# Patient Record
Sex: FEMALE | Race: WHITE | NOT HISPANIC OR LATINO | Employment: FULL TIME | URBAN - METROPOLITAN AREA
[De-identification: names, ages, dates, MRNs, and addresses within clinical notes are randomized per-mention and may not be internally consistent; named-entity substitution may affect disease eponyms.]

---

## 2017-04-04 ENCOUNTER — TRANSCRIBE ORDERS (OUTPATIENT)
Dept: ADMINISTRATIVE | Facility: HOSPITAL | Age: 62
End: 2017-04-04

## 2017-04-04 DIAGNOSIS — M85.80 OSTEOPENIA: Primary | ICD-10-CM

## 2017-04-11 ENCOUNTER — HOSPITAL ENCOUNTER (OUTPATIENT)
Dept: BONE DENSITY | Facility: HOSPITAL | Age: 62
Discharge: HOME | End: 2017-04-11
Admitting: FAMILY MEDICINE

## 2017-04-11 DIAGNOSIS — M85.80 OSTEOPENIA: ICD-10-CM

## 2017-04-11 PROCEDURE — 77080 DXA BONE DENSITY AXIAL: CPT

## 2017-04-11 PROCEDURE — 77080 DXA BONE DENSITY AXIAL: CPT | Performed by: RADIOLOGY

## 2017-10-09 ENCOUNTER — OFFICE VISIT (OUTPATIENT)
Dept: ORTHOPEDIC SURGERY | Facility: CLINIC | Age: 62
End: 2017-10-09

## 2017-10-09 VITALS
HEART RATE: 80 BPM | WEIGHT: 161 LBS | HEIGHT: 69 IN | DIASTOLIC BLOOD PRESSURE: 86 MMHG | BODY MASS INDEX: 23.85 KG/M2 | SYSTOLIC BLOOD PRESSURE: 150 MMHG

## 2017-10-09 DIAGNOSIS — M16.11 PRIMARY OSTEOARTHRITIS OF RIGHT HIP: Primary | ICD-10-CM

## 2017-10-09 PROCEDURE — 99203 OFFICE O/P NEW LOW 30 MIN: CPT | Performed by: ORTHOPAEDIC SURGERY

## 2017-10-09 RX ORDER — MELOXICAM 7.5 MG/1
15 TABLET ORAL ONCE
Status: CANCELLED | OUTPATIENT
Start: 2017-10-09 | End: 2017-10-09

## 2017-10-09 RX ORDER — CHLORHEXIDINE GLUCONATE 0.12 MG/ML
15 RINSE ORAL EVERY 12 HOURS SCHEDULED
Status: CANCELLED | OUTPATIENT
Start: 2017-10-09

## 2017-10-09 RX ORDER — OXYCODONE HCL 10 MG/1
10 TABLET, FILM COATED, EXTENDED RELEASE ORAL ONCE
Status: CANCELLED | OUTPATIENT
Start: 2017-10-09 | End: 2017-10-09

## 2017-10-09 RX ORDER — LEVOTHYROXINE SODIUM 112 UG/1
TABLET ORAL
Refills: 11 | COMMUNITY
Start: 2017-09-24 | End: 2017-11-21

## 2017-10-09 RX ORDER — ACETAMINOPHEN 325 MG/1
1000 TABLET ORAL ONCE
Status: CANCELLED | OUTPATIENT
Start: 2017-10-09 | End: 2017-10-09

## 2017-10-09 RX ORDER — LOTEPREDNOL ETABONATE AND TOBRAMYCIN 5; 3 MG/ML; MG/ML
SUSPENSION/ DROPS OPHTHALMIC
Refills: 0 | COMMUNITY
Start: 2017-10-03 | End: 2017-11-21

## 2017-10-09 RX ORDER — ALENDRONATE SODIUM 70 MG/1
TABLET ORAL
Refills: 11 | COMMUNITY
Start: 2017-07-14 | End: 2017-11-21

## 2017-10-09 RX ORDER — PREGABALIN 150 MG/1
150 CAPSULE ORAL ONCE
Status: CANCELLED | OUTPATIENT
Start: 2017-10-09 | End: 2017-10-09

## 2017-10-09 RX ORDER — PHENOL 1.4 %
600 AEROSOL, SPRAY (ML) MUCOUS MEMBRANE DAILY
COMMUNITY

## 2017-10-09 NOTE — PROGRESS NOTES
[facility] [place] [facility] Note    Subjective     Chief Complaint   Patient presents with   • Right Hip - Pain        HPI    Maria Eugenia Sandoval is a 62 y.o. female. She presents today for right hip pain.  Pain is been worsening over the past 6 months, but was present prior to that at a lower grade.  Pain is continuous, moderate to severe, associated with stiffness.  Pain is sharp, worsens with standing, sitting, and climbing stairs.  She did have a previous injection in her hip with Dr. Blanco in June 2017, which helped for about a month and a half.  He is interested in pursuing right total hip arthroplasty surgery.    The patient has been considering right hip total joint replacement surgery.  The pain has been severe, causing instability, and has been worsening in spite of medications, physical therapy, and joint injection.The pain interferes with walking, sleeping, work and leisure activities.  No previous history of blood clots and family history of clotting disorders.      There is no problem list on file for this patient.    Past Medical History:   Diagnosis Date   • Heart disease       Past Surgical History:   Procedure Laterality Date   • ANKLE OPEN REDUCTION INTERNAL FIXATION        Family History   Problem Relation Age of Onset   • Osteoarthritis Mother    • Breast cancer Neg Hx    • Ovarian cancer Neg Hx      Social History     Social History   • Marital status: Single     Spouse name: N/A   • Number of children: N/A   • Years of education: N/A     Occupational History   • Not on file.     Social History Main Topics   • Smoking status: Current Every Day Smoker   • Smokeless tobacco: Never Used   • Alcohol use Yes      Comment: occasional   • Drug use: No   • Sexual activity: Defer     Other Topics Concern   • Not on file     Social History Narrative      No current outpatient prescriptions on file prior to visit.     No current facility-administered medications on file prior to visit.       No Known  Allergies     Review of Systems   Constitutional: Negative for activity change, appetite change, chills, diaphoresis, fatigue, fever and unexpected weight change.   HENT: Negative for congestion, dental problem, drooling, ear discharge, ear pain, facial swelling, hearing loss, mouth sores, nosebleeds, postnasal drip, rhinorrhea, sinus pressure, sneezing, sore throat, tinnitus, trouble swallowing and voice change.    Eyes: Negative for photophobia, pain, discharge, redness, itching and visual disturbance.   Respiratory: Positive for cough. Negative for apnea, choking, chest tightness, shortness of breath, wheezing and stridor.    Cardiovascular: Negative for chest pain, palpitations and leg swelling.   Gastrointestinal: Negative for abdominal distention, abdominal pain, anal bleeding, blood in stool, constipation, diarrhea, nausea, rectal pain and vomiting.   Endocrine: Negative for cold intolerance, heat intolerance, polydipsia, polyphagia and polyuria.   Genitourinary: Negative for decreased urine volume, difficulty urinating, dysuria, enuresis, flank pain, frequency, genital sores, hematuria and urgency.   Musculoskeletal: Positive for arthralgias (Right Hip pain). Negative for back pain, gait problem, joint swelling, myalgias, neck pain and neck stiffness.   Skin: Negative for color change, pallor, rash and wound.   Allergic/Immunologic: Negative for environmental allergies, food allergies and immunocompromised state.   Neurological: Negative for dizziness, tremors, seizures, syncope, facial asymmetry, speech difficulty, weakness, light-headedness, numbness and headaches.   Hematological: Negative for adenopathy. Does not bruise/bleed easily.   Psychiatric/Behavioral: Negative for agitation, behavioral problems, confusion, decreased concentration, dysphoric mood, hallucinations, self-injury, sleep disturbance and suicidal ideas. The patient is not nervous/anxious and is not hyperactive.         Objective   "    Physical Exam  /86  Pulse 80  Ht 68.5\" (174 cm)  Wt 161 lb (73 kg)  BMI 24.12 kg/m2    Body mass index is 24.12 kg/(m^2).    General:   Mental Status:  Alert   Appearance: Cooperative, in no acute distress   Build and Nutrition: Well-nourished and well developed female   Orientation: Alert and oriented to person, place and time   Posture: Normal   Gait: Normal    Integument:   Right hip: No skin lesions, no rash, no ecchymosis    Neurologic:   Sensation:    Right foot: Intact to light touch on the dorsal and plantar aspect   Motor:  Right lower extremity: 5/5 quadriceps, hamstrings, ankle dorsiflexors, and ankle plantar flexors    Vascular:   Right lower extremity: 2+ dorsalis pedis pulse, prompt capillary refill    Lower Extremities:   Right Hip:    Tenderness:  None    Swelling: None    Crepitus:  None    Atrophy:  None    Range of motion:  External Rotation: 30°       Internal Rotation: 30°, with pain in the groin       Flexion:  100°       Extension:  0°   Instability:  None  Deformities:  None  Functional testing: Positive Stinchfield    No leg length discrepancy      Imaging/Studies    Outside radiographs were reviewed, which showed advanced arthritic changes in the right hip, with osteophyte formation on both the femoral head and acetabulum.    Assessment and Plan     Maria Eugenia was seen today for pain.    Diagnoses and all orders for this visit:    Primary osteoarthritis of right hip  -     Case Request; Standing  -     CBC and Differential; Future  -     Comprehensive metabolic panel; Future  -     Protime-INR; Future  -     APTT; Future  -     Hemoglobin A1c; Future  -     Sedimentation rate; Future  -     C-reactive protein; Future  -     Type and screen; Future  -     Urinalysis With / Culture If Indicated - Urine, Clean Catch; Future  -     ECG 12 Lead; Future  -     ceFAZolin (ANCEF) 2 g in sodium chloride 0.9 % 100 mL IVPB; Infuse 2 g into a venous catheter 1 (One) Time.  -     acetaminophen " (TYLENOL) tablet 975 mg; Take 3 tablets by mouth 1 (One) Time.  -     meloxicam (MOBIC) tablet 15 mg; Take 2 tablets by mouth 1 (One) Time.  -     pregabalin (LYRICA) capsule 150 mg; Take 1 capsule by mouth 1 (One) Time.  -     mupirocin (BACTROBAN) 2 % nasal ointment 1 application; 1 application by Each Nare route 1 (One) Time.  -     oxyCODONE (oxyCONTIN) 12 hr tablet 10 mg; Take 1 tablet by mouth 1 (One) Time.  -     Tranexamic Acid 730 mg in sodium chloride 0.9 % 250 mL; Infuse 730 mg into a venous catheter 1 (One) Time.  -     Tranexamic Acid 730 mg in sodium chloride 0.9 % 250 mL; Infuse 730 mg into a venous catheter 1 (One) Time.  -     mupirocin (BACTROBAN) 2 % nasal ointment; into each nostril 2 (Two) Times a Day.  -     chlorhexidine (PERIDEX) 0.12 % solution 15 mL; Apply 15 mL to the mouth or throat Every 12 (Twelve) Hours.  -     Case Request    Other orders  -     Inpatient Admission; Standing  -     Follow Anesthesia Guidelines / Standing Orders; Future  -     Orthopedic Discharge Planning for PT & Case Management - Inpatient With Post-Op Day 2 or 3 Discharge  -     Obtain informed consent  -     Provide instructions to patient regarding NPO status  -     Clorhexidine skin prep  -     Follow Anesthesia Guidelines / Standing Orders; Standing  -     Verify NPO Status; Standing  -     CHRISTINE hose- To be placed on patient in pre-op; Standing  -     SCD (sequential compression device)- to be placed on patient in Pre-op; Standing  -     Clip operative site; Standing  -     Obtain informed consent (if not collected inpatient or PAT); Standing  -     Notify Physician - Standard; Standing        I reviewed my findings with patient today.  She has advanced right hip arthritis, and has been considering right total hip arthroplasty surgery, and presented today in hopes of scheduling.  She is a good candidate.  Please see my notes and above for details.  We will schedule at a mutually convenient time.  We see my  counseling note for details.    Return for For surgery as planned.     Surgical Counseling     I have informed the patient of the diagnosis and the prognosis.  Exhaustive conservative treatment modalities have not resulted in long term pain relief.  The symptoms have progressed to the point of daily pain and inability to perform activities of daily living without significant pain.  The patient has reached the point of desiring to proceed with total hip arthroplasty after discussing the risks, benefits and alternatives to the procedure.  The surgical procedure itself was discussed in detail.  Risks of the procedure were discussed, which included but are not limited to, bleeding, infection, damage to blood vessels and nerves, loosening of the prosthesis, deep infection, need for further surgery, leg length discrepancy, hip dislocation, loss of limb, deep venous thrombosis, pulmonary embolus, death, heart attack, stroke, kidney failure, liver failure, and anesthetic complications.  In addition, the potential for deep infection developing in the future was discussed, which could require further surgery.  The hip would have to be re-opened, debrided, and potentially remove the prosthesis, which may or may not be replaced in the future.  Also, the possibility for loosening of the prosthesis has been mentioned.  If the prosthesis loosened, a revision arthroplasty could be performed, with results that are not as predictable compared to the original procedure.  The typical rehabilitative course has also been discussed, and full recovery may take up to a year to see the maximum benefit.  The importance of patient cooperation in the rehabilitative efforts has also been discussed.  No guarantees whatsoever were given.  The patient understands the potential risks versus the benefits and desires to proceed with total hip arthroplasty at a mutually convenient time.      Medical Decision Making  Management Options : major surgery  with risk factors  Data/Risk: independent visualization of imaging, lab tests, or EMG/NCV      Marek Talamantes MD  10/09/17  9:10 AM

## 2017-11-21 ENCOUNTER — APPOINTMENT (OUTPATIENT)
Dept: PREADMISSION TESTING | Facility: HOSPITAL | Age: 62
End: 2017-11-21

## 2017-11-21 VITALS — HEIGHT: 70 IN | WEIGHT: 167.11 LBS | BODY MASS INDEX: 23.92 KG/M2

## 2017-11-21 DIAGNOSIS — Z01.89 LABORATORY TEST: Primary | ICD-10-CM

## 2017-11-21 DIAGNOSIS — M16.11 PRIMARY OSTEOARTHRITIS OF RIGHT HIP: ICD-10-CM

## 2017-11-21 LAB
ABO GROUP BLD: NORMAL
ALBUMIN SERPL-MCNC: 4.6 G/DL (ref 3.2–4.8)
ALBUMIN/GLOB SERPL: 1.4 G/DL (ref 1.5–2.5)
ALP SERPL-CCNC: 92 U/L (ref 25–100)
ALT SERPL W P-5'-P-CCNC: 20 U/L (ref 7–40)
ANION GAP SERPL CALCULATED.3IONS-SCNC: 4 MMOL/L (ref 3–11)
APTT PPP: 27.1 SECONDS (ref 24–31)
AST SERPL-CCNC: 27 U/L (ref 0–33)
BASOPHILS # BLD AUTO: 0.06 10*3/MM3 (ref 0–0.2)
BASOPHILS NFR BLD AUTO: 1 % (ref 0–1)
BILIRUB SERPL-MCNC: 0.7 MG/DL (ref 0.3–1.2)
BILIRUB UR QL STRIP: NEGATIVE
BUN BLD-MCNC: 9 MG/DL (ref 9–23)
BUN/CREAT SERPL: 15 (ref 7–25)
CALCIUM SPEC-SCNC: 9.9 MG/DL (ref 8.7–10.4)
CHLORIDE SERPL-SCNC: 107 MMOL/L (ref 99–109)
CLARITY UR: CLEAR
CO2 SERPL-SCNC: 27 MMOL/L (ref 20–31)
COLOR UR: YELLOW
CREAT BLD-MCNC: 0.6 MG/DL (ref 0.6–1.3)
CRP SERPL-MCNC: 0.2 MG/DL (ref 0–1)
DEPRECATED RDW RBC AUTO: 46.5 FL (ref 37–54)
EOSINOPHIL # BLD AUTO: 0.15 10*3/MM3 (ref 0–0.3)
EOSINOPHIL NFR BLD AUTO: 2.4 % (ref 0–3)
ERYTHROCYTE [DISTWIDTH] IN BLOOD BY AUTOMATED COUNT: 12.6 % (ref 11.3–14.5)
ERYTHROCYTE [SEDIMENTATION RATE] IN BLOOD: 12 MM/HR (ref 0–30)
GFR SERPL CREATININE-BSD FRML MDRD: 102 ML/MIN/1.73
GLOBULIN UR ELPH-MCNC: 3.4 GM/DL
GLUCOSE BLD-MCNC: 92 MG/DL (ref 70–100)
GLUCOSE UR STRIP-MCNC: NEGATIVE MG/DL
HBA1C MFR BLD: 5.1 % (ref 4.8–5.6)
HCT VFR BLD AUTO: 42.9 % (ref 34.5–44)
HGB BLD-MCNC: 14.4 G/DL (ref 11.5–15.5)
HGB UR QL STRIP.AUTO: NEGATIVE
IMM GRANULOCYTES # BLD: 0.01 10*3/MM3 (ref 0–0.03)
IMM GRANULOCYTES NFR BLD: 0.2 % (ref 0–0.6)
INR PPP: 1
KETONES UR QL STRIP: NEGATIVE
LEUKOCYTE ESTERASE UR QL STRIP.AUTO: NEGATIVE
LYMPHOCYTES # BLD AUTO: 2.29 10*3/MM3 (ref 0.6–4.8)
LYMPHOCYTES NFR BLD AUTO: 36.8 % (ref 24–44)
MCH RBC QN AUTO: 34 PG (ref 27–31)
MCHC RBC AUTO-ENTMCNC: 33.6 G/DL (ref 32–36)
MCV RBC AUTO: 101.4 FL (ref 80–99)
MONOCYTES # BLD AUTO: 0.47 10*3/MM3 (ref 0–1)
MONOCYTES NFR BLD AUTO: 7.6 % (ref 0–12)
NEUTROPHILS # BLD AUTO: 3.24 10*3/MM3 (ref 1.5–8.3)
NEUTROPHILS NFR BLD AUTO: 52 % (ref 41–71)
NITRITE UR QL STRIP: NEGATIVE
PH UR STRIP.AUTO: 7 [PH] (ref 5–8)
PLATELET # BLD AUTO: 216 10*3/MM3 (ref 150–450)
PMV BLD AUTO: 10.6 FL (ref 6–12)
POTASSIUM BLD-SCNC: 4.3 MMOL/L (ref 3.5–5.5)
PROT SERPL-MCNC: 8 G/DL (ref 5.7–8.2)
PROT UR QL STRIP: NEGATIVE
PROTHROMBIN TIME: 10.9 SECONDS (ref 9.6–11.5)
RBC # BLD AUTO: 4.23 10*6/MM3 (ref 3.89–5.14)
RH BLD: POSITIVE
SODIUM BLD-SCNC: 138 MMOL/L (ref 132–146)
SP GR UR STRIP: <=1.005 (ref 1–1.03)
UROBILINOGEN UR QL STRIP: NORMAL
WBC NRBC COR # BLD: 6.22 10*3/MM3 (ref 3.5–10.8)

## 2017-11-21 PROCEDURE — 85610 PROTHROMBIN TIME: CPT | Performed by: ORTHOPAEDIC SURGERY

## 2017-11-21 PROCEDURE — 85025 COMPLETE CBC W/AUTO DIFF WBC: CPT | Performed by: ORTHOPAEDIC SURGERY

## 2017-11-21 PROCEDURE — 36415 COLL VENOUS BLD VENIPUNCTURE: CPT

## 2017-11-21 PROCEDURE — 80053 COMPREHEN METABOLIC PANEL: CPT | Performed by: ORTHOPAEDIC SURGERY

## 2017-11-21 PROCEDURE — 85730 THROMBOPLASTIN TIME PARTIAL: CPT | Performed by: ORTHOPAEDIC SURGERY

## 2017-11-21 PROCEDURE — 83036 HEMOGLOBIN GLYCOSYLATED A1C: CPT | Performed by: ORTHOPAEDIC SURGERY

## 2017-11-21 PROCEDURE — 86140 C-REACTIVE PROTEIN: CPT | Performed by: ORTHOPAEDIC SURGERY

## 2017-11-21 PROCEDURE — 81003 URINALYSIS AUTO W/O SCOPE: CPT | Performed by: ORTHOPAEDIC SURGERY

## 2017-11-21 PROCEDURE — 86901 BLOOD TYPING SEROLOGIC RH(D): CPT

## 2017-11-21 PROCEDURE — 93010 ELECTROCARDIOGRAM REPORT: CPT | Performed by: INTERNAL MEDICINE

## 2017-11-21 PROCEDURE — 93005 ELECTROCARDIOGRAM TRACING: CPT

## 2017-11-21 PROCEDURE — 86900 BLOOD TYPING SEROLOGIC ABO: CPT

## 2017-11-21 RX ORDER — IBUPROFEN 200 MG
600 TABLET ORAL EVERY 6 HOURS PRN
Status: ON HOLD | COMMUNITY
End: 2017-12-06

## 2017-11-21 RX ORDER — LEVOTHYROXINE SODIUM 112 UG/1
112 TABLET ORAL DAILY
COMMUNITY
End: 2022-10-21

## 2017-11-21 RX ORDER — CIPROFLOXACIN 500 MG/1
500 TABLET, FILM COATED ORAL 2 TIMES DAILY
Status: ON HOLD | COMMUNITY
End: 2017-12-05

## 2017-11-21 RX ORDER — METRONIDAZOLE 500 MG/1
500 TABLET ORAL 3 TIMES DAILY
Status: ON HOLD | COMMUNITY
End: 2017-12-05

## 2017-11-21 ASSESSMENT — HOOS JR
HOOS JR SCORE: 49.858
HOOS JR SCORE: 13

## 2017-12-04 ENCOUNTER — ANESTHESIA EVENT (OUTPATIENT)
Dept: PERIOP | Facility: HOSPITAL | Age: 62
End: 2017-12-04

## 2017-12-04 DIAGNOSIS — M16.11 PRIMARY OSTEOARTHRITIS OF RIGHT HIP: Primary | ICD-10-CM

## 2017-12-04 RX ORDER — FAMOTIDINE 10 MG/ML
20 INJECTION, SOLUTION INTRAVENOUS ONCE
Status: CANCELLED | OUTPATIENT
Start: 2017-12-04 | End: 2017-12-04

## 2017-12-05 ENCOUNTER — ANESTHESIA (OUTPATIENT)
Dept: PERIOP | Facility: HOSPITAL | Age: 62
End: 2017-12-05

## 2017-12-05 ENCOUNTER — HOSPITAL ENCOUNTER (INPATIENT)
Facility: HOSPITAL | Age: 62
LOS: 1 days | Discharge: HOME | End: 2017-12-06
Attending: ORTHOPAEDIC SURGERY | Admitting: ORTHOPAEDIC SURGERY

## 2017-12-05 ENCOUNTER — APPOINTMENT (OUTPATIENT)
Dept: GENERAL RADIOLOGY | Facility: HOSPITAL | Age: 62
End: 2017-12-05

## 2017-12-05 DIAGNOSIS — Z78.9 IMPAIRED MOBILITY AND ADLS: ICD-10-CM

## 2017-12-05 DIAGNOSIS — M16.11 PRIMARY OSTEOARTHRITIS OF RIGHT HIP: ICD-10-CM

## 2017-12-05 DIAGNOSIS — Z74.09 IMPAIRED MOBILITY AND ADLS: ICD-10-CM

## 2017-12-05 DIAGNOSIS — Z96.641 STATUS POST TOTAL REPLACEMENT OF RIGHT HIP: ICD-10-CM

## 2017-12-05 DIAGNOSIS — Z74.09 IMPAIRED FUNCTIONAL MOBILITY, BALANCE, GAIT, AND ENDURANCE: Primary | ICD-10-CM

## 2017-12-05 PROBLEM — Z72.0 TOBACCO ABUSE: Status: ACTIVE | Noted: 2017-12-05

## 2017-12-05 PROBLEM — E03.9 HYPOTHYROID: Status: ACTIVE | Noted: 2017-12-05

## 2017-12-05 LAB
ABO GROUP BLD: NORMAL
BLD GP AB SCN SERPL QL: NEGATIVE
RH BLD: POSITIVE

## 2017-12-05 PROCEDURE — C1755 CATHETER, INTRASPINAL: HCPCS | Performed by: ORTHOPAEDIC SURGERY

## 2017-12-05 PROCEDURE — 25010000002 ROPIVACAINE PER 1 MG: Performed by: ORTHOPAEDIC SURGERY

## 2017-12-05 PROCEDURE — 97162 PT EVAL MOD COMPLEX 30 MIN: CPT

## 2017-12-05 PROCEDURE — 25010000002 ONDANSETRON PER 1 MG: Performed by: NURSE ANESTHETIST, CERTIFIED REGISTERED

## 2017-12-05 PROCEDURE — 25010000002 HYDROMORPHONE PER 4 MG: Performed by: ORTHOPAEDIC SURGERY

## 2017-12-05 PROCEDURE — 25010000002 PROPOFOL 10 MG/ML EMULSION: Performed by: ANESTHESIOLOGY

## 2017-12-05 PROCEDURE — 25010000002 DEXAMETHASONE PER 1 MG: Performed by: NURSE ANESTHETIST, CERTIFIED REGISTERED

## 2017-12-05 PROCEDURE — 0SR903A REPLACEMENT OF RIGHT HIP JOINT WITH CERAMIC SYNTHETIC SUBSTITUTE, UNCEMENTED, OPEN APPROACH: ICD-10-PCS | Performed by: ORTHOPAEDIC SURGERY

## 2017-12-05 PROCEDURE — 86900 BLOOD TYPING SEROLOGIC ABO: CPT | Performed by: ORTHOPAEDIC SURGERY

## 2017-12-05 PROCEDURE — 27130 TOTAL HIP ARTHROPLASTY: CPT | Performed by: ORTHOPAEDIC SURGERY

## 2017-12-05 PROCEDURE — 73502 X-RAY EXAM HIP UNI 2-3 VIEWS: CPT

## 2017-12-05 PROCEDURE — C1776 JOINT DEVICE (IMPLANTABLE): HCPCS | Performed by: ORTHOPAEDIC SURGERY

## 2017-12-05 PROCEDURE — 25010000003 MORPHINE PER 10 MG: Performed by: ORTHOPAEDIC SURGERY

## 2017-12-05 PROCEDURE — 25010000003 CEFAZOLIN IN DEXTROSE 2-4 GM/100ML-% SOLUTION: Performed by: ORTHOPAEDIC SURGERY

## 2017-12-05 PROCEDURE — 86901 BLOOD TYPING SEROLOGIC RH(D): CPT | Performed by: ORTHOPAEDIC SURGERY

## 2017-12-05 PROCEDURE — 86850 RBC ANTIBODY SCREEN: CPT | Performed by: ORTHOPAEDIC SURGERY

## 2017-12-05 PROCEDURE — 97116 GAIT TRAINING THERAPY: CPT

## 2017-12-05 DEVICE — TOTL HIP COA DEPUY 9641334: Type: IMPLANTABLE DEVICE | Site: ACETABULUM | Status: FUNCTIONAL

## 2017-12-05 DEVICE — TOTL HIP GRIPTION CUP DEPUY UPCHRG: Type: IMPLANTABLE DEVICE | Site: ACETABULUM | Status: FUNCTIONAL

## 2017-12-05 DEVICE — PINNACLE HIP SOLUTIONS ALTRX POLYETHYLENE ACETABULAR LINER +4 NEUTRAL 36MM ID 52MM OD
Type: IMPLANTABLE DEVICE | Site: ACETABULUM | Status: FUNCTIONAL
Brand: PINNACLE ALTRX

## 2017-12-05 DEVICE — PINNACLE GRIPTION ACETABULAR SHELL SECTOR 52MM OD
Type: IMPLANTABLE DEVICE | Site: ACETABULUM | Status: FUNCTIONAL
Brand: PINNACLE GRIPTION

## 2017-12-05 DEVICE — SUMMIT FEMORAL STEM 12/14 TAPER TAPER ED W/POROCOAT SIZE 5 STD 145MM
Type: IMPLANTABLE DEVICE | Site: ACETABULUM | Status: FUNCTIONAL
Brand: SUMMIT POROCOAT

## 2017-12-05 DEVICE — BIOLOX DELTA CERAMIC FEMORAL HEAD +1.5 36MM DIA 12/14 TAPER
Type: IMPLANTABLE DEVICE | Site: ACETABULUM | Status: FUNCTIONAL
Brand: BIOLOX DELTA

## 2017-12-05 RX ORDER — MORPHINE SULFATE 0.5 MG/ML
INJECTION, SOLUTION EPIDURAL; INTRATHECAL; INTRAVENOUS AS NEEDED
Status: DISCONTINUED | OUTPATIENT
Start: 2017-12-05 | End: 2017-12-05 | Stop reason: HOSPADM

## 2017-12-05 RX ORDER — MELOXICAM 7.5 MG/1
15 TABLET ORAL ONCE
Status: COMPLETED | OUTPATIENT
Start: 2017-12-05 | End: 2017-12-05

## 2017-12-05 RX ORDER — ACETAMINOPHEN 500 MG
1000 TABLET ORAL ONCE
Status: COMPLETED | OUTPATIENT
Start: 2017-12-05 | End: 2017-12-05

## 2017-12-05 RX ORDER — ONDANSETRON 2 MG/ML
4 INJECTION INTRAMUSCULAR; INTRAVENOUS EVERY 6 HOURS PRN
Status: DISCONTINUED | OUTPATIENT
Start: 2017-12-05 | End: 2017-12-06 | Stop reason: HOSPADM

## 2017-12-05 RX ORDER — CHLORHEXIDINE GLUCONATE 0.12 MG/ML
15 RINSE ORAL EVERY 12 HOURS
Status: DISCONTINUED | OUTPATIENT
Start: 2017-12-05 | End: 2017-12-06 | Stop reason: HOSPADM

## 2017-12-05 RX ORDER — ROPIVACAINE HYDROCHLORIDE 5 MG/ML
INJECTION, SOLUTION EPIDURAL; INFILTRATION; PERINEURAL AS NEEDED
Status: DISCONTINUED | OUTPATIENT
Start: 2017-12-05 | End: 2017-12-05 | Stop reason: HOSPADM

## 2017-12-05 RX ORDER — PROMETHAZINE HYDROCHLORIDE 25 MG/ML
6.25 INJECTION, SOLUTION INTRAMUSCULAR; INTRAVENOUS ONCE AS NEEDED
Status: DISCONTINUED | OUTPATIENT
Start: 2017-12-05 | End: 2017-12-05 | Stop reason: HOSPADM

## 2017-12-05 RX ORDER — NALOXONE HCL 0.4 MG/ML
0.1 VIAL (ML) INJECTION
Status: DISCONTINUED | OUTPATIENT
Start: 2017-12-05 | End: 2017-12-06 | Stop reason: HOSPADM

## 2017-12-05 RX ORDER — NICOTINE 21 MG/24HR
1 PATCH, TRANSDERMAL 24 HOURS TRANSDERMAL
Status: DISCONTINUED | OUTPATIENT
Start: 2017-12-05 | End: 2017-12-06 | Stop reason: HOSPADM

## 2017-12-05 RX ORDER — PREGABALIN 75 MG/1
150 CAPSULE ORAL ONCE
Status: COMPLETED | OUTPATIENT
Start: 2017-12-05 | End: 2017-12-05

## 2017-12-05 RX ORDER — HYDROMORPHONE HYDROCHLORIDE 1 MG/ML
0.5 INJECTION, SOLUTION INTRAMUSCULAR; INTRAVENOUS; SUBCUTANEOUS
Status: DISCONTINUED | OUTPATIENT
Start: 2017-12-05 | End: 2017-12-06 | Stop reason: HOSPADM

## 2017-12-05 RX ORDER — LEVOTHYROXINE SODIUM 112 UG/1
112 TABLET ORAL
Status: DISCONTINUED | OUTPATIENT
Start: 2017-12-06 | End: 2017-12-06 | Stop reason: HOSPADM

## 2017-12-05 RX ORDER — CEFAZOLIN SODIUM 2 G/100ML
2 INJECTION, SOLUTION INTRAVENOUS EVERY 8 HOURS
Status: COMPLETED | OUTPATIENT
Start: 2017-12-05 | End: 2017-12-06

## 2017-12-05 RX ORDER — PROPOFOL 10 MG/ML
VIAL (ML) INTRAVENOUS CONTINUOUS PRN
Status: DISCONTINUED | OUTPATIENT
Start: 2017-12-05 | End: 2017-12-05 | Stop reason: SURG

## 2017-12-05 RX ORDER — BISACODYL 10 MG
10 SUPPOSITORY, RECTAL RECTAL DAILY PRN
Status: DISCONTINUED | OUTPATIENT
Start: 2017-12-05 | End: 2017-12-06 | Stop reason: HOSPADM

## 2017-12-05 RX ORDER — OXYCODONE HCL 10 MG/1
10 TABLET, FILM COATED, EXTENDED RELEASE ORAL ONCE
Status: COMPLETED | OUTPATIENT
Start: 2017-12-05 | End: 2017-12-05

## 2017-12-05 RX ORDER — DEXAMETHASONE SODIUM PHOSPHATE 10 MG/ML
INJECTION INTRAMUSCULAR; INTRAVENOUS AS NEEDED
Status: DISCONTINUED | OUTPATIENT
Start: 2017-12-05 | End: 2017-12-05 | Stop reason: SURG

## 2017-12-05 RX ORDER — FENTANYL CITRATE 50 UG/ML
50 INJECTION, SOLUTION INTRAMUSCULAR; INTRAVENOUS
Status: DISCONTINUED | OUTPATIENT
Start: 2017-12-05 | End: 2017-12-05 | Stop reason: HOSPADM

## 2017-12-05 RX ORDER — SODIUM CHLORIDE 0.9 % (FLUSH) 0.9 %
1-10 SYRINGE (ML) INJECTION AS NEEDED
Status: DISCONTINUED | OUTPATIENT
Start: 2017-12-05 | End: 2017-12-05 | Stop reason: HOSPADM

## 2017-12-05 RX ORDER — ONDANSETRON 2 MG/ML
INJECTION INTRAMUSCULAR; INTRAVENOUS AS NEEDED
Status: DISCONTINUED | OUTPATIENT
Start: 2017-12-05 | End: 2017-12-05 | Stop reason: SURG

## 2017-12-05 RX ORDER — DIPHENHYDRAMINE HCL 25 MG
25 CAPSULE ORAL EVERY 6 HOURS PRN
Status: DISCONTINUED | OUTPATIENT
Start: 2017-12-05 | End: 2017-12-06 | Stop reason: HOSPADM

## 2017-12-05 RX ORDER — MORPHINE SULFATE 2 MG/ML
4 INJECTION, SOLUTION INTRAMUSCULAR; INTRAVENOUS
Status: DISCONTINUED | OUTPATIENT
Start: 2017-12-05 | End: 2017-12-06 | Stop reason: HOSPADM

## 2017-12-05 RX ORDER — MAGNESIUM HYDROXIDE 1200 MG/15ML
LIQUID ORAL AS NEEDED
Status: DISCONTINUED | OUTPATIENT
Start: 2017-12-05 | End: 2017-12-05 | Stop reason: HOSPADM

## 2017-12-05 RX ORDER — CHLORHEXIDINE GLUCONATE 0.12 MG/ML
15 RINSE ORAL EVERY 12 HOURS SCHEDULED
Status: DISCONTINUED | OUTPATIENT
Start: 2017-12-05 | End: 2017-12-05

## 2017-12-05 RX ORDER — HYDRALAZINE HYDROCHLORIDE 20 MG/ML
10 INJECTION INTRAMUSCULAR; INTRAVENOUS EVERY 6 HOURS PRN
Status: DISCONTINUED | OUTPATIENT
Start: 2017-12-05 | End: 2017-12-06 | Stop reason: HOSPADM

## 2017-12-05 RX ORDER — SODIUM CHLORIDE 0.9 % (FLUSH) 0.9 %
1-10 SYRINGE (ML) INJECTION AS NEEDED
Status: DISCONTINUED | OUTPATIENT
Start: 2017-12-05 | End: 2017-12-06 | Stop reason: HOSPADM

## 2017-12-05 RX ORDER — LIDOCAINE HYDROCHLORIDE 10 MG/ML
0.5 INJECTION, SOLUTION EPIDURAL; INFILTRATION; INTRACAUDAL; PERINEURAL ONCE AS NEEDED
Status: COMPLETED | OUTPATIENT
Start: 2017-12-05 | End: 2017-12-05

## 2017-12-05 RX ORDER — PROMETHAZINE HYDROCHLORIDE 25 MG/1
25 TABLET ORAL ONCE AS NEEDED
Status: DISCONTINUED | OUTPATIENT
Start: 2017-12-05 | End: 2017-12-05 | Stop reason: HOSPADM

## 2017-12-05 RX ORDER — BUPIVACAINE HYDROCHLORIDE 5 MG/ML
INJECTION, SOLUTION EPIDURAL; INTRACAUDAL AS NEEDED
Status: DISCONTINUED | OUTPATIENT
Start: 2017-12-05 | End: 2017-12-05 | Stop reason: SURG

## 2017-12-05 RX ORDER — CEFAZOLIN SODIUM 2 G/100ML
2 INJECTION, SOLUTION INTRAVENOUS ONCE
Status: COMPLETED | OUTPATIENT
Start: 2017-12-05 | End: 2017-12-05

## 2017-12-05 RX ORDER — SODIUM CHLORIDE 9 MG/ML
120 INJECTION, SOLUTION INTRAVENOUS CONTINUOUS
Status: DISCONTINUED | OUTPATIENT
Start: 2017-12-05 | End: 2017-12-06 | Stop reason: HOSPADM

## 2017-12-05 RX ORDER — FAMOTIDINE 20 MG/1
20 TABLET, FILM COATED ORAL ONCE
Status: COMPLETED | OUTPATIENT
Start: 2017-12-05 | End: 2017-12-05

## 2017-12-05 RX ORDER — ONDANSETRON 4 MG/1
4 TABLET, FILM COATED ORAL EVERY 6 HOURS PRN
Status: DISCONTINUED | OUTPATIENT
Start: 2017-12-05 | End: 2017-12-06 | Stop reason: HOSPADM

## 2017-12-05 RX ORDER — SODIUM CHLORIDE, SODIUM LACTATE, POTASSIUM CHLORIDE, CALCIUM CHLORIDE 600; 310; 30; 20 MG/100ML; MG/100ML; MG/100ML; MG/100ML
9 INJECTION, SOLUTION INTRAVENOUS CONTINUOUS
Status: DISCONTINUED | OUTPATIENT
Start: 2017-12-05 | End: 2017-12-05

## 2017-12-05 RX ORDER — DOCUSATE SODIUM 100 MG/1
100 CAPSULE, LIQUID FILLED ORAL 2 TIMES DAILY PRN
Status: DISCONTINUED | OUTPATIENT
Start: 2017-12-05 | End: 2017-12-06 | Stop reason: HOSPADM

## 2017-12-05 RX ORDER — MELOXICAM 7.5 MG/1
15 TABLET ORAL DAILY
Status: DISCONTINUED | OUTPATIENT
Start: 2017-12-05 | End: 2017-12-06 | Stop reason: HOSPADM

## 2017-12-05 RX ORDER — HYDROCODONE BITARTRATE AND ACETAMINOPHEN 7.5; 325 MG/1; MG/1
1 TABLET ORAL EVERY 4 HOURS PRN
Status: DISCONTINUED | OUTPATIENT
Start: 2017-12-05 | End: 2017-12-06 | Stop reason: HOSPADM

## 2017-12-05 RX ORDER — PROMETHAZINE HYDROCHLORIDE 25 MG/1
25 SUPPOSITORY RECTAL ONCE AS NEEDED
Status: DISCONTINUED | OUTPATIENT
Start: 2017-12-05 | End: 2017-12-05 | Stop reason: HOSPADM

## 2017-12-05 RX ORDER — EPHEDRINE SULFATE 50 MG/ML
5 INJECTION, SOLUTION INTRAVENOUS ONCE AS NEEDED
Status: DISCONTINUED | OUTPATIENT
Start: 2017-12-05 | End: 2017-12-05 | Stop reason: HOSPADM

## 2017-12-05 RX ORDER — NALOXONE HCL 0.4 MG/ML
0.4 VIAL (ML) INJECTION
Status: DISCONTINUED | OUTPATIENT
Start: 2017-12-05 | End: 2017-12-06 | Stop reason: HOSPADM

## 2017-12-05 RX ORDER — BISACODYL 5 MG/1
10 TABLET, DELAYED RELEASE ORAL DAILY PRN
Status: DISCONTINUED | OUTPATIENT
Start: 2017-12-05 | End: 2017-12-06 | Stop reason: HOSPADM

## 2017-12-05 RX ORDER — SENNA AND DOCUSATE SODIUM 50; 8.6 MG/1; MG/1
2 TABLET, FILM COATED ORAL 2 TIMES DAILY
Status: DISCONTINUED | OUTPATIENT
Start: 2017-12-05 | End: 2017-12-06 | Stop reason: HOSPADM

## 2017-12-05 RX ORDER — ACETAMINOPHEN 325 MG/1
650 TABLET ORAL EVERY 4 HOURS PRN
Status: DISCONTINUED | OUTPATIENT
Start: 2017-12-05 | End: 2017-12-06 | Stop reason: HOSPADM

## 2017-12-05 RX ORDER — ASPIRIN 325 MG
325 TABLET, DELAYED RELEASE (ENTERIC COATED) ORAL DAILY
Status: DISCONTINUED | OUTPATIENT
Start: 2017-12-06 | End: 2017-12-06 | Stop reason: HOSPADM

## 2017-12-05 RX ADMIN — FAMOTIDINE 20 MG: 20 TABLET, FILM COATED ORAL at 10:34

## 2017-12-05 RX ADMIN — ACETAMINOPHEN 1000 MG: 500 TABLET ORAL at 10:34

## 2017-12-05 RX ADMIN — MUPIROCIN 1 APPLICATION: 20 OINTMENT TOPICAL at 10:34

## 2017-12-05 RX ADMIN — MELOXICAM 15 MG: 7.5 TABLET ORAL at 10:34

## 2017-12-05 RX ADMIN — Medication 2 TABLET: at 17:31

## 2017-12-05 RX ADMIN — TRANEXAMIC ACID 1000 MG: 100 INJECTION, SOLUTION INTRAVENOUS at 12:15

## 2017-12-05 RX ADMIN — DEXAMETHASONE SODIUM PHOSPHATE 8 MG: 10 INJECTION INTRAMUSCULAR; INTRAVENOUS at 12:21

## 2017-12-05 RX ADMIN — HYDROCODONE BITARTRATE AND ACETAMINOPHEN 1 TABLET: 7.5; 325 TABLET ORAL at 21:35

## 2017-12-05 RX ADMIN — MELOXICAM 15 MG: 7.5 TABLET ORAL at 16:13

## 2017-12-05 RX ADMIN — HYDROMORPHONE HYDROCHLORIDE 0.5 MG: 10 INJECTION INTRAMUSCULAR; INTRAVENOUS; SUBCUTANEOUS at 21:37

## 2017-12-05 RX ADMIN — CEFAZOLIN SODIUM 2 G: 2 INJECTION, SOLUTION INTRAVENOUS at 21:35

## 2017-12-05 RX ADMIN — PROPOFOL 100 MCG/KG/MIN: 10 INJECTION, EMULSION INTRAVENOUS at 12:05

## 2017-12-05 RX ADMIN — HYDROCODONE BITARTRATE AND ACETAMINOPHEN 1 TABLET: 7.5; 325 TABLET ORAL at 16:39

## 2017-12-05 RX ADMIN — SODIUM CHLORIDE, POTASSIUM CHLORIDE, SODIUM LACTATE AND CALCIUM CHLORIDE 9 ML/HR: 600; 310; 30; 20 INJECTION, SOLUTION INTRAVENOUS at 10:35

## 2017-12-05 RX ADMIN — NICOTINE 1 PATCH: 21 PATCH, EXTENDED RELEASE TRANSDERMAL at 16:12

## 2017-12-05 RX ADMIN — SODIUM CHLORIDE, POTASSIUM CHLORIDE, SODIUM LACTATE AND CALCIUM CHLORIDE: 600; 310; 30; 20 INJECTION, SOLUTION INTRAVENOUS at 11:53

## 2017-12-05 RX ADMIN — SODIUM CHLORIDE 120 ML/HR: 9 INJECTION, SOLUTION INTRAVENOUS at 14:59

## 2017-12-05 RX ADMIN — BUPIVACAINE HYDROCHLORIDE 15 MG: 5 INJECTION, SOLUTION EPIDURAL; INTRACAUDAL; PERINEURAL at 12:05

## 2017-12-05 RX ADMIN — TRANEXAMIC ACID 1000 MG: 100 INJECTION, SOLUTION INTRAVENOUS at 13:33

## 2017-12-05 RX ADMIN — CEFAZOLIN SODIUM 2 G: 2 INJECTION, SOLUTION INTRAVENOUS at 12:05

## 2017-12-05 RX ADMIN — ONDANSETRON 4 MG: 2 INJECTION INTRAMUSCULAR; INTRAVENOUS at 13:54

## 2017-12-05 RX ADMIN — OXYCODONE HYDROCHLORIDE 10 MG: 10 TABLET, FILM COATED, EXTENDED RELEASE ORAL at 10:34

## 2017-12-05 RX ADMIN — PREGABALIN 150 MG: 75 CAPSULE ORAL at 10:34

## 2017-12-05 RX ADMIN — LIDOCAINE HYDROCHLORIDE 0.5 ML: 10 INJECTION, SOLUTION EPIDURAL; INFILTRATION; INTRACAUDAL; PERINEURAL at 10:34

## 2017-12-05 NOTE — ANESTHESIA PROCEDURE NOTES
Spinal Block    Patient location during procedure: OR  Indication:at surgeon's request  Performed By  Anesthesiologist: MELODY HOWARD  Preanesthetic Checklist  Completed: patient identified, site marked, surgical consent, pre-op evaluation, timeout performed, IV checked, risks and benefits discussed and monitors and equipment checked  Spinal Block Prep:  Patient Position:sitting  Sterile Tech:cap, gloves, sterile barriers and mask  Prep:Chloraprep  Patient Monitoring:blood pressure monitoring, continuous pulse oximetry and EKG  Spinal Block Procedure  Approach:midline  Guidance:landmark technique and palpation technique  Location:L4-L5  Needle Type:Jayden  Needle Gauge:25 G  Placement of Spinal needle event:cerebrospinal fluid aspirated    Fluid Appearance:clear  Post Assessment  Patient Tolerance:patient tolerated the procedure well with no apparent complications  Complications no  Additional Notes  Procedure:  Pt assisted to sitting position, with legs in position of comfort over side of bed.  Pt. instructed in optimal spine presentation, the spine was prepped/ Draped and the skin at insertion site was anesthetized with 1% Lidocaine 2 ml.  The spinal needle was then advanced until CSF flow was obtained and LA was injected:      Marcaine 0.5% PSF injected 3 Mg.

## 2017-12-05 NOTE — OP NOTE
DATE OF PROCEDURE:  12/05/17    SURGEON: Marek Talamantes MD    ASSISTANT: Danni Glasgow PA-C     PREOPERATIVE DIAGNOSIS: right hip arthritis    POSTOPERATIVE DIAGNOSIS: right hip arthritis    PROCEDURE PERFORMED: right total hip arthroplasty with DePuy components (# 52 Press-Fit [place] cup with + 4 neutral polyethylene liner with # 5 standard Offset Press-Fit [place] Stem with + 1.5 x 36 ceramic head).     SPECIMENS: None.    ANESTHESIA:  Spinal    STAFF:  Circulator: Jeannette Hurley RN; Bao Tabares RN  Scrub Person: Marilee Amezquita; Elisa Thomason; Zonia Helton  Nursing Assistant: Vasu Johns; Chana Cevallos; No Stuart  Assistant: Danni Glasgow PA-C    ESTIMATED BLOOD LOSS: 150 cc    COMPLICATIONS: None    PREOPERATIVE ANTIBIOTICS: Ancef 2 g    INDICATIONS: The patient is a 62-year-old female with a history of debilitating right hip pain secondary to advanced osteoarthritis, that failed to improve in spite of conservative treatment. The patient opted for a right total hip arthroplasty at this time and consented for the procedure. Please see my office notes for details with regard to preoperative counseling and operative rationale.     DESCRIPTION OF PROCEDURE: The patient was positively identified in the preoperative holding area, brought to the operative suite, and placed in a supine position. After adequate spinal anesthetic had been achieved, the patient was placed in lateral decubitus position with the right side up. The patient was well padded on the pegboard table. After sterile prep and drape of the right hip and lower extremity, a timeout procedure was performed to confirm the operative site, as well as the other parameters. A skin incision was made on the lateral aspect of the hip for a modified direct lateral approach. Following a sharp skin incision, dissection was carried down to the level of the fascia which was incised in line with its fibers. The underlying interval between  the anterior one third and posterior two thirds of the gluteus medius was then entered after the bursa had been reflected posteriorly. This was elevated as a full thickness flap and preserved for later repair. The hip capsule was then incised in a T-shaped fashion and the head dislocated from the acetabulum. Description of femoral head: Complete eburnation, with osteophytes at the base. The head was then resected with the aid of an oscillating saw blade. Description of acetabulum: Complete wear, with thickening of the labrum. The acetabulum was sequentially reamed up to 51 to accommodate a 52 Press-Fit [place] Cup, which had excellent press-fit characteristics and did not require any screw fixation. A trial +4 neutral polyethylene liner was placed. Attention was then redirected towards the femoral aspect. Sequential reaming and broaching was performed on the femur to accommodate a # 5 broach with a + 1.5 x 36 head.  Trial reduction was performed, with no dislocation throughout the full arc of motion, with appropriate limb lengths.  The hip was again dislocated, and the final +4 neutral polyethylene liner was placed and the final # 5  standard offset Press-Fit [place] Stem was placed, followed by + 1.5 x 36 ceramic head, and the same reduction characteristics were noted as with the trial with no dislocation throughout the full arc of motion, and appropriate limb lengths.  Therefore, the hip was copiously irrigated and attention was directed towards closure. The hip capsule was closed with #1 Vicryl in an interrupted figure-of-eight fashion, followed by closure of the gluteus medius and vastus lateralis sleeve as a full thickness sleeve repair with #1 Vicryl in an interrupted figure-of-eight fashion, followed by closure of the fascia sahara with #1 Vicryl in an interrupted figure-of-eight fashion in 3 strategic locations both proximally, distally and in the central portion, followed by oversewing this from distal to  proximal with a #1 StrataFix symmetric, which nicely sealed that layer, followed by closure of the deep fascial layer with #1 Vicryl in a buried interrupted fashion, followed by closure of the subcutaneous layer with 2-0 Vicryl and the skin with 3-0 StrataFix in a running subcuticular fashion. Prineo wound closure dressing was applied followed by a sterile dressing with 4 x 4s secured with micropore tape.  The patient tolerated the procedure well and was brought to the recovery room in good condition.     POSTOPERATIVE PLAN:  1. The patient will begin early range of motion and weight-bearing per the post total hip arthroplasty protocol.   2. I anticipate brief hospitalization for initial rehabilitation and pain control followed by continued rehabilitation in a home health setting.   3. Postoperative medical management with Dr. Lerma.  4. Postoperative DVT prophylaxis with aspirin unless there is a contraindication.       Marek Talamantes MD  12/05/17  1:48 PM

## 2017-12-05 NOTE — H&P
Patient Care Team:      Chief complaint: right hip pain    Subjective:    Patient is a 62 y.o.female presents with worsening pain in her right hip is here for surgical intervention.    Review of Systems:  General ROS: negative  negative for - chills, fatigue or fever  Cardiovascular ROS: no chest pain or dyspnea on exertion  Respiratory ROS: no cough, shortness of breath, or wheezing      Allergies: No Known Allergies       Latex:No  Contrast Dye: No    Home Meds    Prescriptions Prior to Admission   Medication Sig Dispense Refill Last Dose   • calcium carbonate (OS-YASMINE) 600 MG tablet Take 600 mg by mouth Daily.   Past Week   • ibuprofen (ADVIL,MOTRIN) 200 MG tablet Take 600 mg by mouth Every 6 (Six) Hours As Needed for Mild Pain  or Moderate Pain .   Past Week   • levothyroxine (SYNTHROID, LEVOTHROID) 112 MCG tablet Take 112 mcg by mouth Daily.   12/5/2017 at 0600     PMH:   Past Medical History:   Diagnosis Date   • Arthritis    • Cataract    • Disease of thyroid gland    • Heart disease    • Wears contact lenses    • Wears glasses      PSH:    Past Surgical History:   Procedure Laterality Date   • ANKLE OPEN REDUCTION INTERNAL FIXATION     • TUMOR REMOVAL       Immunization History: pneumo: No   Flu: No  Tetanus: Unknown  Social History:   Tobacco:Current every day smoker 0.5 ppd X 15 years   Alcohol: No      Physical Exam:  Heart:  RRR with no MRG  Lungs: CTAB    Immunizations:    Tetanus:     Influenza:     Pneumo:    Labs were reviewed.     EKG: NSR    General Appearance:    Alert, cooperative, no distress, appears stated age   Head:    Normocephalic, without obvious abnormality, atraumatic   Lungs:     Clear to auscultation bilaterally, respirations unlabored    Heart: Regular rate and rhythm, S1 and S2 normal, no murmur, rub    or gallop    Abdomen:    Soft without tenderness   Breast Exam:    deferred   Genitalia:    deferred   Extremities:   Extremities normal, atraumatic, no cyanosis or edema   Skin:    Skin color, texture, turgor normal, no rashes or lesions   Neurologic:   Grossly intact     Results Review: Labs were reviewed    Impression: Right Hip DJD    Plan: Right YAMILETH    MARÍA Spencer 12/5/2017 10:32 AM      Plan for right YAMILETH - agree with above

## 2017-12-05 NOTE — ANESTHESIA PREPROCEDURE EVALUATION
Anesthesia Evaluation     Patient summary reviewed and Nursing notes reviewed          Airway   Mallampati: I  TM distance: >3 FB  Neck ROM: full  no difficulty expected  Dental      Pulmonary - negative pulmonary ROS   Cardiovascular - negative cardio ROS    ECG reviewed        Neuro/Psych- negative ROS  GI/Hepatic/Renal/Endo    (+)  hypothyroidism,     Musculoskeletal (-) negative ROS    Abdominal    Substance History - negative use     OB/GYN negative ob/gyn ROS         Other                                        Anesthesia Plan    ASA 2     spinal     intravenous induction   Anesthetic plan and risks discussed with patient.    Plan discussed with CRNA.

## 2017-12-05 NOTE — H&P
Patient Name: Maria Eugenia Sandoval  MRN: [id number]  : 1955  DOS: 2017    Attending: Marek Talamantes MD    Primary Care Provider: Maria Dolores Huerta MD      Chief complaint:  right hip pain    Subjective   Patient is a 62 y.o. female presented for right total hip arthroplasty by Dr. Talamantes under spinal anesthesia. She tolerated surgery well and is admitted for further medical management. Her hip has been painful for about a year. She denies use of assistive device for ambulation.    When seen postop she is doing well. She denies pain, but is still under effects of spinal anesthesia. She denies nausea, shortness of breath or chest pain. No hx of DVT or PE.     ( above is noted, agree. Seen in her room later. She is doing well.  Her pain is under control.  He ambulated 10 feet with physical therapy earlier with a rolling walker and minimal assist of 2.  She did have some residual numbness from her spinal block at the time.  Currently no complains of nausea vomiting or shortness breath.  )wy    Allergies:  No Known Allergies    Meds:  Prescriptions Prior to Admission   Medication Sig Dispense Refill Last Dose   • calcium carbonate (OS-YASMINE) 600 MG tablet Take 600 mg by mouth Daily.   Past Week   • ibuprofen (ADVIL,MOTRIN) 200 MG tablet Take 600 mg by mouth Every 6 (Six) Hours As Needed for Mild Pain  or Moderate Pain .   Past Week   • levothyroxine (SYNTHROID, LEVOTHROID) 112 MCG tablet Take 112 mcg by mouth Daily.   2017 at 0600       History:   Past Medical History:   Diagnosis Date   • Arthritis    • Cataract    • Hypothyroid     • Heart disease    • Wears contact lenses    • Wears glasses      Past Surgical History:   Procedure Laterality Date   • ANKLE OPEN REDUCTION INTERNAL FIXATION     • TUMOR REMOVAL       Family History   Problem Relation Age of Onset   • Osteoarthritis Mother    • Breast cancer Neg Hx    • Ovarian cancer Neg Hx      Social History   Substance Use Topics   • Smoking status: Current  "Every Day Smoker     Packs/day: 0.50     Years: 15.00     Types: Cigarettes   • Smokeless tobacco: Never Used   • Alcohol use Yes      Comment: occasional   She lives with her son. She has 2 children. 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    Review of Systems  Pertinent items are noted in HPI, all other systems reviewed and negative    Vital Signs  /58 (BP Location: Right arm, Patient Position: Sitting)  Pulse 59  Temp 97 °F (36.1 °C) (Temporal Artery )   Resp 16  Ht 177.8 cm (70\")  Wt 75.8 kg (167 lb 1.7 oz)  SpO2 96%  BMI 23.98 kg/m2    Physical Exam:    General Appearance:    Alert, cooperative, in no acute distress   Head:    Normocephalic, without obvious abnormality, atraumatic   Eyes:            Lids and lashes normal, conjunctivae and sclerae normal, no   icterus, no pallor, corneas clear, PERRLA   Ears:    Ears appear intact with no abnormalities noted   Neck:   No adenopathy, supple, trachea midline, no thyromegaly, no     carotid bruit, no JVD   Lungs:     Clear to auscultation,respirations regular, even and                   unlabored    Heart:    Regular rhythm and normal rate, normal S1 and S2, no            murmur, no gallop, no rub, no click   Abdomen:     Normal bowel sounds, no masses, no organomegaly, soft        non-tender, non-distended, no guarding, no rebound                 tenderness   Genitalia:    Deferred   Extremities:   Right hip with bulky surgical dressing CDI.   Pulses:   Pulses palpable and equal bilaterally   Skin:   No bleeding, bruising or rash   Neurologic:   Cranial nerves 2 - 12 grossly intact, lack of sensation or movement BLE due to effects of spinal block  F/u exam: Flexion and dorsiflexion intact bilateral feet.        I reviewed the patient's new clinical results.     Results for MARKUS SAAB (MRN [id number]) as of 12/5/2017 15:32   Ref. Range 11/21/2017 11:23   Glucose Latest Ref Range: 70 - 100 mg/dL 92   Sodium Latest Ref Range: 132 - 146 mmol/L 138 "   Potassium Latest Ref Range: 3.5 - 5.5 mmol/L 4.3   CO2 Latest Ref Range: 20.0 - 31.0 mmol/L 27.0   Chloride Latest Ref Range: 99 - 109 mmol/L 107   Anion Gap Latest Ref Range: 3.0 - 11.0 mmol/L 4.0   Creatinine Latest Ref Range: 0.60 - 1.30 mg/dL 0.60   BUN Latest Ref Range: 9 - 23 mg/dL 9   BUN/Creatinine Ratio Latest Ref Range: 7.0 - 25.0  15.0   Calcium Latest Ref Range: 8.7 - 10.4 mg/dL 9.9   eGFR Non African Amer Latest Ref Range: >60 mL/min/1.73 102   Alkaline Phosphatase Latest Ref Range: 25 - 100 U/L 92   Total Protein Latest Ref Range: 5.7 - 8.2 g/dL 8.0   ALT (SGPT) Latest Ref Range: 7 - 40 U/L 20   AST (SGOT) Latest Ref Range: 0 - 33 U/L 27   Total Bilirubin Latest Ref Range: 0.3 - 1.2 mg/dL 0.7   Albumin Latest Ref Range: 3.20 - 4.80 g/dL 4.60   Globulin Latest Units: gm/dL 3.4   A/G Ratio Latest Ref Range: 1.5 - 2.5 g/dL 1.4 (L)   Hemoglobin A1C Latest Ref Range: 4.80 - 5.60 % 5.10   C-Reactive Protein Latest Ref Range: 0.00 - 1.00 mg/dL 0.20   Protime Latest Ref Range: 9.6 - 11.5 Seconds 10.9   INR Unknown 1.00   aPTT Latest Ref Range: 24.0 - 31.0 seconds 27.1   WBC Latest Ref Range: 3.50 - 10.80 10*3/mm3 6.22   RBC Latest Ref Range: 3.89 - 5.14 10*6/mm3 4.23   Hemoglobin Latest Ref Range: 11.5 - 15.5 g/dL 14.4   Hematocrit Latest Ref Range: 34.5 - 44.0 % 42.9   RDW Latest Ref Range: 11.3 - 14.5 % 12.6   MCV Latest Ref Range: 80.0 - 99.0 fL 101.4 (H)   MCH Latest Ref Range: 27.0 - 31.0 pg 34.0 (H)   MCHC Latest Ref Range: 32.0 - 36.0 g/dL 33.6   MPV Latest Ref Range: 6.0 - 12.0 fL 10.6   Platelets Latest Ref Range: 150 - 450 10*3/mm3 216     Assessment and Plan:     Right total hip arthroplasty.    Status post total replacement of right hip  Active Problems:    Primary osteoarthritis of right hip    Hypothyroid    Tobacco abuse      Plan  1. PT/OT- early ambulation post op  2. Pain control-prns   3. IS-encourage  4. DVT proph- mechs/ASA  5. Bowel regimen  6. Resume home medications as appropriate  7.  Monitor post-op labs  8. DC planning for home    Hypothyroid  - continue home Synthroid     Seen and examined by me. Agree with above. Discussed with patient. jose Lerma MD  12/05/17  8:18 PM

## 2017-12-05 NOTE — THERAPY EVALUATION
Acute Care - Physical Therapy Initial Evaluation  [facility]     Patient Name: Maria Eugenia Sandoval  : 1955  MRN: [id number]  Today's Date: 2017   Onset of Illness/Injury or Date of Surgery Date: 17  Date of Referral to PT: 17  Referring Physician: MD Albin      Admit Date: 2017     Visit Dx:    ICD-10-CM ICD-9-CM   1. Impaired functional mobility, balance, gait, and endurance Z74.09 V49.89   2. Primary osteoarthritis of right hip M16.11 715.15     Patient Active Problem List   Diagnosis   • Primary osteoarthritis of right hip   • Status post total replacement of right hip   • Hypothyroid   • Tobacco abuse     Past Medical History:   Diagnosis Date   • Arthritis    • Cataract    • Disease of thyroid gland    • Heart disease    • Wears contact lenses    • Wears glasses      Past Surgical History:   Procedure Laterality Date   • ANKLE OPEN REDUCTION INTERNAL FIXATION     • TUMOR REMOVAL            PT ASSESSMENT (last 72 hours)      PT Evaluation       17 1730 17 1530    Rehab Evaluation    Document Type evaluation  -     Subjective Information agree to therapy;complains of;numbness;pain   c/o mild residual numbness from spinal block  -MJ     Patient Effort, Rehab Treatment good  -     Symptoms Noted During/After Treatment fatigue  -     General Information    Patient Profile Review yes  -MJ     Onset of Illness/Injury or Date of Surgery Date 17  -MJ     Referring Physician MD Albin  -     General Observations Pt supine in bed, IV, SCDs. Family present  -     Pertinent History Of Current Problem Pt presents for surgical management of R hip pain and dysfunction that failed to improve with conservative management  -     Precautions/Limitations fall precautions;hip precautions- right  -     Prior Level of Function min assist:;all household mobility;community mobility;gait;transfer;bed mobility   Pain increased with activity  -     Equipment Currently Used at Home  walker, rolling;cane, straight;shower chair;grab bar   owns, was not using  -MJ none  -NT    Plans/Goals Discussed With patient and family;agreed upon  -MJ     Risks Reviewed patient and family:;LOB;increased discomfort  -MJ     Benefits Reviewed patient and family:;improve function;increase independence;increase strength;increase balance;decrease pain  -MJ     Barriers to Rehab none identified  -MJ     Living Environment    Lives With child(joan), adult  -MJ     Living Arrangements house  -MJ     Home Accessibility stairs to enter home   walk-in shower  -MJ     Number of Stairs to Enter Home 2  -MJ     Stair Railings at Home none  -MJ     Living Environment Comment Pt reports 2 sons and sister will be available to assist at discharge  -MJ     Clinical Impression    Date of Referral to PT 12/05/17  -MJ     PT Diagnosis s/p elective R YAMILETH  -MJ     Criteria for Skilled Therapeutic Interventions Met yes;treatment indicated  -MJ     Pain Assessment    Pain Assessment 0-10  -MJ     Pain Score 3  -MJ     Post Pain Score 3  -MJ     Pain Type Acute pain  -MJ     Pain Location Hip  -MJ     Pain Orientation Right  -MJ     Pain Intervention(s) Repositioned;Ambulation/increased activity;Cold pack  -MJ     Vision Assessment/Intervention    Visual Impairment WFL with corrective lenses  -MJ     Cognitive Assessment/Intervention    Current Cognitive/Communication Assessment functional  -MJ     Orientation Status oriented x 4  -MJ     Follows Commands/Answers Questions 100% of the time;able to follow multi-step instructions;needs cueing  -MJ     Personal Safety WNL/WFL  -MJ     ROM (Range of Motion)    General ROM lower extremity range of motion deficits identified  -MJ     General LE Assessment    ROM Detail R hip AROM impaired 25%  -MJ     MMT (Manual Muscle Testing)    General MMT Assessment lower extremity strength deficits identified  -MJ     Lower Extremity    Lower Ext Manual Muscle Testing Detail R LE 4-/5; L LE 4+/5  -MJ      Muscle Tone Assessment    Muscle Tone Assessment  Bilateral Upper Extremities;Bilateral Lower Extremities;LLE;RLE  -NT    Bilateral Upper Extremities Muscle Tone Assessment  associated movements noted  -NT    Bilateral Lower Extremities Muscle Tone Assessment  mildly decreased tone  -NT    Mobility Assessment/Training    Extremity Weight-Bearing Status right lower extremity  -MJ     Right Lower Extremity Weight-Bearing weight-bearing as tolerated  -MJ     Bed Mobility, Assessment/Treatment    Bed Mobility, Assistive Device bed rails;head of bed elevated  -MJ     Bed Mob, Supine to Sit, [place] contact guard assist;verbal cues required  -MJ     Bed Mob, Sit to Supine, [place] not tested   Pt UIC  -MJ     Bed Mobility, Safety Issues decreased use of legs for bridging/pushing  -MJ     Bed Mobility, Impairments ROM decreased;strength decreased;pain  -MJ     Bed Mobility, Comment Verbal cues to move LEs off EOB and to use UEs to push trunk to sitting. CGA with R LE, increased time and effort to perform  -MJ     Transfer Assessment/Treatment    Transfers, Sit-Stand [place] contact guard assist;2 person assist required;verbal cues required  -MJ     Transfers, Stand-Sit [place] contact guard assist;2 person assist required;verbal cues required  -MJ     Transfers, Sit-Stand-Sit, Assist Device rolling walker  -MJ     Transfer, Safety Issues sequencing ability decreased;step length decreased;weight-shifting ability decreased  -MJ     Transfer, Impairments ROM decreased;strength decreased;pain  -MJ     Transfer, Comment Verbal cues to push up from bed with UEs to stand and to reach back for chair to lower into sitting. Cues to step R LE out prior to t/f  -MJ     Gait Assessment/Treatment    Gait, [place] Level minimum assist (75% patient effort);2 person assist required;verbal cues required  -MJ     Gait, Assistive Device rolling walker  -MJ     Gait, Distance (Feet) 10  -MJ     Gait, Gait Pattern  Analysis swing-through gait  -     Gait, Gait Deviations right:;ataxia;heber decreased;step length decreased;weight-shifting ability decreased  -     Gait, Safety Issues sequencing ability decreased;step length decreased;weight-shifting ability decreased  -     Gait, Impairments ROM decreased;strength decreased;pain  -     Gait, Comment Pt demo step through gait pattern with increased step length and ataxia noted on the R due to numbness from spinal block. Pt with increased UE weight bearing and required cues to stay in middle of RW. Chair brought up behind pt once fatigued. Gait limited by pain, fatigue and poor quality d/t residual numbness  -     Therapy Exercises    Exercise Protocols total hip  -     Total Hip Exercises right:;10 reps;ankle pumps/circles;quad set;glut set  -     Sensory Assessment/Intervention    Light Touch RLE  -     RLE Light Touch moderate impairment   reports mild numbness; can actively DF  -     Positioning and Restraints    Pre-Treatment Position in bed  -     Post Treatment Position chair  -MJ     In Chair notified nsg;reclined;call light within reach;encouraged to call for assist;with family/caregiver  -       12/05/17 1028       Living Environment    Lives With child(joan), adult  -MY     Living Arrangements house  -MY     Home Accessibility no concerns  -MY     Type of Financial/Environmental Concern none  -MY     Transportation Available car;family or friend will provide  -MY       User Key  (r) = Recorded By, (t) = Taken By, (c) = Cosigned By    Initials Name Provider Type    MY Kenya Peraza, RN Registered Nurse    BEBETO Belle, PT Physical Therapist    NT Macie Ramos, RN Registered Nurse          Physical Therapy Education     Title: PT OT SLP Therapies (Active)     Topic: Physical Therapy (Active)     Point: Mobility training (Done)    Learning Progress Summary    Learner Readiness Method Response Comment Documented by Status   Patient  Acceptance KARLA GALEANANR Reviewed hip precautions, effects of spinal, benefits of mobility  12/05/17 1803 Done   Family Acceptance KARLA GALEANANR Reviewed hip precautions, effects of spinal, benefits of mobility  12/05/17 1803 Done               Point: Body mechanics (Done)    Learning Progress Summary    Learner Readiness Method Response Comment Documented by Status   Patient Acceptance KARLA GALEANANR Reviewed hip precautions, effects of spinal, benefits of mobility  12/05/17 1803 Done   Family Acceptance KARLA GALEANANR Reviewed hip precautions, effects of spinal, benefits of mobility  12/05/17 1803 Done               Point: Precautions (Done)    Learning Progress Summary    Learner Readiness Method Response Comment Documented by Status   Patient Acceptance KARLA GALEANANR Reviewed hip precautions, effects of spinal, benefits of mobility  12/05/17 1803 Done   Family Acceptance KARLA GALEANA NR Reviewed hip precautions, effects of spinal, benefits of mobility  12/05/17 1803 Done                      User Key     Initials Effective Dates Name Provider Type Discipline     03/14/16 -  Charly Belle PT Physical Therapist PT                PT Recommendation and Plan  Anticipated Equipment Needs At Discharge: bedside commode  Anticipated Discharge Disposition: home with assist, home with home health  Demonstrates Need for Referral to Another Service: home health care  Planned Therapy Interventions: balance training, bed mobility training, gait training, home exercise program, patient/family education, stair training, strengthening, transfer training  PT Frequency: 2 times/day  Plan of Care Review  Plan Of Care Reviewed With: patient  Outcome Summary/Follow up Plan: Pt ambulated 10 feet with MinAx2 and RW, limited by pain and residual numbness from spinal block. PT will follow to increase strength and progress functional mobility with hip precautions. Plan is home with HHPT at discharge          IP PT Goals       12/05/17 1804          Bed  Mobility PT LTG    Bed Mobility PT LTG, Date Established 12/05/17  -MJ      Bed Mobility PT LTG, Time to Achieve 3 days  -MJ      Bed Mobility PT LTG, Activity Type supine to sit/sit to supine  -MJ      Bed Mobility PT LTG, [place] Level conditional independence  -MJ      Bed Mobility PT LTG, Date Goal Reviewed 12/05/17  -MJ      Bed Mobility PT LTG, Outcome goal ongoing  -MJ      Transfer Training PT LTG    Transfer Training PT LTG, Date Established 12/05/17  -MJ      Transfer Training PT LTG, Time to Achieve 3 days  -MJ      Transfer Training PT LTG, Activity Type sit to stand/stand to sit  -MJ      Transfer Training PT LTG, [place] Level conditional independence  -MJ      Transfer Training PT LTG, Assist Device walker, rolling  -MJ      Transfer Training PT  LTG, Date Goal Reviewed 12/05/17  -MJ      Transfer Training PT LTG, Outcome goal ongoing  -MJ      Gait Training PT LTG    Gait Training Goal PT LTG, Date Established 12/05/17  -MJ      Gait Training Goal PT LTG, Time to Achieve 3 days  -MJ      Gait Training Goal PT LTG, [place] Level conditional independence  -MJ      Gait Training Goal PT LTG, Assist Device walker, rolling  -MJ      Gait Training Goal PT LTG, Distance to Achieve 500 feet  -MJ      Gait Training Goal PT LTG, Date Goal Reviewed 12/05/17  -MJ      Gait Training Goal PT LTG, Outcome goal ongoing  -MJ      Stair Training PT LTG    Stair Training Goal PT LTG, Date Established 12/05/17  -MJ      Stair Training Goal PT LTG, Time to Achieve 3 days  -MJ      Stair Training Goal PT LTG, Number of Steps 2  -MJ      Stair Training Goal PT LTG, [place] Level contact guard assist  -MJ      Stair Training Goal PT LTG, Assist Device cane, straight   +HHA  -MJ      Stair Training Goal PT LTG, Date Goal Reviewed 12/05/17  -MJ      Stair Training Goal PT LTG, Outcome goal ongoing  -MJ        User Key  (r) = Recorded By, (t) = Taken By, (c) = Cosigned By    Initials Name Provider Type     BEBETO Belle, PT Physical Therapist                Outcome Measures       12/05/17 1730          How much help from another person do you currently need...    Turning from your back to your side while in flat bed without using bedrails? 3  -MJ      Moving from lying on back to sitting on the side of a flat bed without bedrails? 3  -MJ      Moving to and from a bed to a chair (including a wheelchair)? 3  -MJ      Standing up from a chair using your arms (e.g., wheelchair, bedside chair)? 3  -MJ      Climbing 3-5 steps with a railing? 1  -MJ      To walk in hospital room? 2  -MJ      AM-PAC 6 Clicks Score 15  -MJ      Functional Assessment    Outcome Measure Options AM-PAC 6 Clicks Basic Mobility (PT)  -BEBETO        User Key  (r) = Recorded By, (t) = Taken By, (c) = Cosigned By    Initials Name Provider Type    BEBETO Belle PT Physical Therapist           Time Calculation:         PT Charges       12/05/17 1806          Time Calculation    Start Time 1730  -      PT Received On 12/05/17  -      PT Goal Re-Cert Due Date 12/15/17  -      Time Calculation- PT    Total Timed Code Minutes- PT 9 minute(s)  -        User Key  (r) = Recorded By, (t) = Taken By, (c) = Cosigned By    Initials Name Provider Type    BEBETO Belle PT Physical Therapist          Therapy Charges for Today     Code Description Service Date Service Provider Modifiers Qty    45278540957 HC PT EVAL MOD COMPLEXITY 2 12/5/2017 Charly Belle, PT GP 1    33314495421 HC GAIT TRAINING EA 15 MIN 12/5/2017 Charly Belle PT GP 1    60031235577 HC PT THER SUPP EA 15 MIN 12/5/2017 Charly Belle PT GP 2          PT G-Codes  Outcome Measure Options: AM-PAC 6 Clicks Basic Mobility (PT)      Charly Belle PT  12/5/2017

## 2017-12-05 NOTE — ANESTHESIA POSTPROCEDURE EVALUATION
Patient: Maria Eugenia Sandoval    Procedure Summary     Date Anesthesia Start Anesthesia Stop Room / Location    12/05/17 1153 1401 [facility] / [facility]       Procedure Diagnosis Surgeon Provider    TOTAL HIP ARTHROPLASTY (Right Hip) Primary osteoarthritis of right hip  (Primary osteoarthritis of right hip [M16.11]) MD Elbert Taylor MD          Anesthesia Type: spinal  Last vitals  BP   140/86 (12/05/17 1043)   Temp   97.6 °F (36.4 °C) (12/05/17 1043)   Pulse   80 (12/05/17 1043)   Resp   18 (12/05/17 1043)     SpO2   97 % (12/05/17 1043)     Post Anesthesia Care and Evaluation    Patient location during evaluation: PACU  Patient participation: complete - patient participated  Level of consciousness: awake and alert  Pain score: 0  Pain management: adequate  Airway patency: patent  Anesthetic complications: No anesthetic complications  PONV Status: none  Cardiovascular status: hemodynamically stable and acceptable  Respiratory status: nonlabored ventilation, acceptable and nasal cannula  Hydration status: acceptable

## 2017-12-05 NOTE — PLAN OF CARE
Problem: Patient Care Overview (Adult)  Goal: Plan of Care Review  Outcome: Ongoing (interventions implemented as appropriate)    12/05/17 1804   Coping/Psychosocial Response Interventions   Plan Of Care Reviewed With patient   Outcome Evaluation   Outcome Summary/Follow up Plan Pt ambulated 10 feet with MinAx2 and RW, limited by pain and residual numbness from spinal block. PT will follow to increase strength and progress functional mobility with hip precautions. Plan is home with HHPT at discharge         Problem: Hip Replacement, Total (Adult)  Goal: Signs and Symptoms of Listed Potential Problems Will be Absent or Manageable (Hip Replacement, Total)  Outcome: Ongoing (interventions implemented as appropriate)    12/05/17 1804   Hip Replacement, Total   Problems Assessed (Total Hip Replacement) pain;functional decline/self care deficit   Problems Present (Total Hip Replacement) pain;functional decline/self care deficit         Problem: Inpatient Physical Therapy  Goal: Bed Mobility Goal LTG- PT  Outcome: Ongoing (interventions implemented as appropriate)    12/05/17 1804   Bed Mobility PT LTG   Bed Mobility PT LTG, Date Established 12/05/17   Bed Mobility PT LTG, Time to Achieve 3 days   Bed Mobility PT LTG, Activity Type supine to sit/sit to supine   Bed Mobility PT LTG, [place] Level conditional independence   Bed Mobility PT LTG, Date Goal Reviewed 12/05/17   Bed Mobility PT LTG, Outcome goal ongoing       Goal: Transfer Training Goal 1 LTG- PT  Outcome: Ongoing (interventions implemented as appropriate)    12/05/17 1804   Transfer Training PT LTG   Transfer Training PT LTG, Date Established 12/05/17   Transfer Training PT LTG, Time to Achieve 3 days   Transfer Training PT LTG, Activity Type sit to stand/stand to sit   Transfer Training PT LTG, [place] Level conditional independence   Transfer Training PT LTG, Assist Device walker, rolling   Transfer Training PT LTG, Date Goal Reviewed 12/05/17    Transfer Training PT LTG, Outcome goal ongoing       Goal: Gait Training Goal LTG- PT  Outcome: Ongoing (interventions implemented as appropriate)    12/05/17 1804   Gait Training PT LTG   Gait Training Goal PT LTG, Date Established 12/05/17   Gait Training Goal PT LTG, Time to Achieve 3 days   Gait Training Goal PT LTG, [place] Level conditional independence   Gait Training Goal PT LTG, Assist Device walker, rolling   Gait Training Goal PT LTG, Distance to Achieve 500 feet   Gait Training Goal PT LTG, Date Goal Reviewed 12/05/17   Gait Training Goal PT LTG, Outcome goal ongoing       Goal: Stair Training Goal LTG- PT  Outcome: Ongoing (interventions implemented as appropriate)    12/05/17 1804   Stair Training PT LTG   Stair Training Goal PT LTG, Date Established 12/05/17   Stair Training Goal PT LTG, Time to Achieve 3 days   Stair Training Goal PT LTG, Number of Steps 2   Stair Training Goal PT LTG, [place] Level contact guard assist   Stair Training Goal PT LTG, Assist Device cane, straight  (+HHA)   Stair Training Goal PT LTG, Date Goal Reviewed 12/05/17   Stair Training Goal PT LTG, Outcome goal ongoing

## 2017-12-06 VITALS
BODY MASS INDEX: 23.92 KG/M2 | SYSTOLIC BLOOD PRESSURE: 127 MMHG | HEART RATE: 66 BPM | TEMPERATURE: 99.5 F | OXYGEN SATURATION: 100 % | HEIGHT: 70 IN | RESPIRATION RATE: 17 BRPM | WEIGHT: 167.11 LBS | DIASTOLIC BLOOD PRESSURE: 61 MMHG

## 2017-12-06 LAB
ANION GAP SERPL CALCULATED.3IONS-SCNC: 5 MMOL/L (ref 3–11)
BUN BLD-MCNC: 9 MG/DL (ref 9–23)
BUN/CREAT SERPL: 15 (ref 7–25)
CALCIUM SPEC-SCNC: 9.1 MG/DL (ref 8.7–10.4)
CHLORIDE SERPL-SCNC: 107 MMOL/L (ref 99–109)
CO2 SERPL-SCNC: 25 MMOL/L (ref 20–31)
CREAT BLD-MCNC: 0.6 MG/DL (ref 0.6–1.3)
DEPRECATED RDW RBC AUTO: 48 FL (ref 37–54)
ERYTHROCYTE [DISTWIDTH] IN BLOOD BY AUTOMATED COUNT: 12.9 % (ref 11.3–14.5)
GFR SERPL CREATININE-BSD FRML MDRD: 102 ML/MIN/1.73
GLUCOSE BLD-MCNC: 107 MG/DL (ref 70–100)
HCT VFR BLD AUTO: 35.5 % (ref 34.5–44)
HGB BLD-MCNC: 11.5 G/DL (ref 11.5–15.5)
MCH RBC QN AUTO: 33 PG (ref 27–31)
MCHC RBC AUTO-ENTMCNC: 32.4 G/DL (ref 32–36)
MCV RBC AUTO: 101.7 FL (ref 80–99)
PLATELET # BLD AUTO: 207 10*3/MM3 (ref 150–450)
PMV BLD AUTO: 10.9 FL (ref 6–12)
POTASSIUM BLD-SCNC: 3.7 MMOL/L (ref 3.5–5.5)
RBC # BLD AUTO: 3.49 10*6/MM3 (ref 3.89–5.14)
SODIUM BLD-SCNC: 137 MMOL/L (ref 132–146)
WBC NRBC COR # BLD: 7.92 10*3/MM3 (ref 3.5–10.8)

## 2017-12-06 PROCEDURE — 97530 THERAPEUTIC ACTIVITIES: CPT

## 2017-12-06 PROCEDURE — 80048 BASIC METABOLIC PNL TOTAL CA: CPT | Performed by: NURSE PRACTITIONER

## 2017-12-06 PROCEDURE — 94799 UNLISTED PULMONARY SVC/PX: CPT

## 2017-12-06 PROCEDURE — 25010000003 CEFAZOLIN IN DEXTROSE 2-4 GM/100ML-% SOLUTION: Performed by: ORTHOPAEDIC SURGERY

## 2017-12-06 PROCEDURE — 97166 OT EVAL MOD COMPLEX 45 MIN: CPT

## 2017-12-06 PROCEDURE — 25010000002 HYDROMORPHONE PER 4 MG: Performed by: ORTHOPAEDIC SURGERY

## 2017-12-06 PROCEDURE — 97110 THERAPEUTIC EXERCISES: CPT

## 2017-12-06 PROCEDURE — 85027 COMPLETE CBC AUTOMATED: CPT | Performed by: ORTHOPAEDIC SURGERY

## 2017-12-06 PROCEDURE — 97116 GAIT TRAINING THERAPY: CPT

## 2017-12-06 RX ORDER — ASPIRIN 325 MG
325 TABLET, DELAYED RELEASE (ENTERIC COATED) ORAL DAILY
Qty: 30 TABLET | Refills: 0 | Status: SHIPPED | OUTPATIENT
Start: 2017-12-07 | End: 2018-09-05

## 2017-12-06 RX ORDER — PSEUDOEPHEDRINE HCL 30 MG
100 TABLET ORAL 2 TIMES DAILY PRN
Qty: 60 CAPSULE | Refills: 0 | Status: SHIPPED | OUTPATIENT
Start: 2017-12-06 | End: 2018-09-05

## 2017-12-06 RX ORDER — HYDROCODONE BITARTRATE AND ACETAMINOPHEN 7.5; 325 MG/1; MG/1
1 TABLET ORAL EVERY 4 HOURS PRN
Qty: 40 TABLET | Refills: 0 | Status: SHIPPED | OUTPATIENT
Start: 2017-12-06 | End: 2017-12-15

## 2017-12-06 RX ORDER — IBUPROFEN 200 MG
600 TABLET ORAL EVERY 6 HOURS PRN
Start: 2017-12-06

## 2017-12-06 RX ADMIN — ACETAMINOPHEN 650 MG: 325 TABLET ORAL at 08:30

## 2017-12-06 RX ADMIN — NICOTINE 1 PATCH: 21 PATCH, EXTENDED RELEASE TRANSDERMAL at 08:31

## 2017-12-06 RX ADMIN — HYDROCODONE BITARTRATE AND ACETAMINOPHEN 1 TABLET: 7.5; 325 TABLET ORAL at 12:01

## 2017-12-06 RX ADMIN — ASPIRIN 325 MG: 325 TABLET, DELAYED RELEASE ORAL at 08:30

## 2017-12-06 RX ADMIN — SODIUM CHLORIDE 120 ML/HR: 9 INJECTION, SOLUTION INTRAVENOUS at 00:07

## 2017-12-06 RX ADMIN — HYDROCODONE BITARTRATE AND ACETAMINOPHEN 1 TABLET: 7.5; 325 TABLET ORAL at 15:24

## 2017-12-06 RX ADMIN — HYDROCODONE BITARTRATE AND ACETAMINOPHEN 1 TABLET: 7.5; 325 TABLET ORAL at 01:44

## 2017-12-06 RX ADMIN — Medication 2 TABLET: at 08:30

## 2017-12-06 RX ADMIN — LEVOTHYROXINE SODIUM 112 MCG: 112 TABLET ORAL at 06:05

## 2017-12-06 RX ADMIN — HYDROMORPHONE HYDROCHLORIDE 0.5 MG: 10 INJECTION INTRAMUSCULAR; INTRAVENOUS; SUBCUTANEOUS at 00:07

## 2017-12-06 RX ADMIN — CEFAZOLIN SODIUM 2 G: 2 INJECTION, SOLUTION INTRAVENOUS at 05:06

## 2017-12-06 RX ADMIN — HYDROCODONE BITARTRATE AND ACETAMINOPHEN 1 TABLET: 7.5; 325 TABLET ORAL at 06:07

## 2017-12-06 RX ADMIN — MELOXICAM 15 MG: 7.5 TABLET ORAL at 08:30

## 2017-12-06 NOTE — THERAPY TREATMENT NOTE
Acute Care - Occupational Therapy Initial Evaluation  [facility]     Patient Name: Maria Eugenia Sandoval  : 1955  MRN: [id number]  Today's Date: 2017  Onset of Illness/Injury or Date of Surgery Date: 17  Date of Referral to OT: 17  Referring Physician: MD Albin    Admit Date: 2017       ICD-10-CM ICD-9-CM   1. Impaired functional mobility, balance, gait, and endurance Z74.09 V49.89   2. Primary osteoarthritis of right hip M16.11 715.15   3. Impaired mobility and ADLs Z74.09 799.89   4. Status post total replacement of right hip Z96.641 V43.64     Patient Active Problem List   Diagnosis   • Primary osteoarthritis of right hip   • Status post total replacement of right hip   • Hypothyroid   • Tobacco abuse     Past Medical History:   Diagnosis Date   • Arthritis    • Cataract    • Disease of thyroid gland    • Heart disease    • Wears contact lenses    • Wears glasses      Past Surgical History:   Procedure Laterality Date   • ANKLE OPEN REDUCTION INTERNAL FIXATION     • TOTAL HIP ARTHROPLASTY Right 2017    Procedure: TOTAL HIP ARTHROPLASTY RIGHT;  Surgeon: Marek Talamantes MD;  Location: [facility];  Service:    • TUMOR REMOVAL            OT ASSESSMENT FLOWSHEET (last 72 hours)      OT Evaluation       17 0844 17 0839 17 0800 17 0607 17 0400    Rehab Evaluation    Document Type (P)  therapy note (daily note);discharge summary  -LR evaluation  -AC       Subjective Information (P)  agree to therapy;complains of;pain  -LR agree to therapy;complains of;pain  -AC       Patient Effort, Rehab Treatment (P)  good  -LR good  -AC       Symptoms Noted During/After Treatment (P)  fatigue  -LR        General Information    Patient Profile Review  yes  -AC       Onset of Illness/Injury or Date of Surgery Date  17  -AC       Referring Physician  MD Albin  -AC       General Observations  Pt received up in chair.   -AC       Pertinent History Of Current Problem  S/P RTHR   -AC       Precautions/Limitations (P)  fall precautions;hip precautions- right  -LR hip precautions- right;fall precautions  -AC       Prior Level of Function  min assist:;all household mobility;ADL's  -AC       Equipment Currently Used at Home  cane, straight;shower chair;walker, rolling;grab bar   built in shower seat; self care kit  -AC       Plans/Goals Discussed With  patient;agreed upon  -AC       Risks Reviewed  patient:;LOB;increased discomfort  -AC       Benefits Reviewed  patient:;improve function;increase independence;increase balance;increase knowledge  -AC       Barriers to Rehab  none identified  -AC       Living Environment    Lives With  child(joan), adult   son  -AC       Living Arrangements  house  -AC       Home Accessibility  stairs to enter home   walk in shower with built in seat  -AC       Number of Stairs to Enter Home  2  -AC       Living Environment Comment  2 sons and sister will assist upon d/c  -AC       Clinical Impression    Date of Referral to OT  12/05/17  -AC       OT Diagnosis  ADL decline  -AC       Patient/Family Goals Statement  Pt would like to increase independnece with self care  -AC       Criteria for Skilled Therapeutic Interventions Met  yes;treatment indicated  -AC       Rehab Potential  good, to achieve stated therapy goals  -AC       Therapy Frequency  daily  -AC       Anticipated Discharge Disposition  home with assist;home with home health  -AC       Pain Assessment    Pain Assessment (P)  0-10  -LR 0-10  -AC       Pain Score  6  -AC  6  -KL (r) LD (t) KL (c) 0  -KL (r) LD (t) KL (c)    Post Pain Score  5  -AC       Pain Type  Acute pain  -AC       Pain Location  Hip  -AC       Pain Orientation  Right  -AC       Pain Intervention(s)  Repositioned  -AC       Response to Interventions  improved  -AC       Vision Assessment/Intervention    Visual Impairment  WNL  -AC       Cognitive Assessment/Intervention    Current Cognitive/Communication Assessment  functional  -AC        Orientation Status  oriented x 4  -AC       Follows Commands/Answers Questions  100% of the time  -AC       Personal Safety  WNL/WFL  -AC       Personal Safety Interventions  fall prevention program maintained;gait belt;nonskid shoes/slippers when out of bed  -AC       ROM (Range of Motion)    General ROM  no range of motion deficits identified  -AC       MMT (Manual Muscle Testing)    General MMT Assessment  no strength deficits identified  -AC       Muscle Tone Assessment    Bilateral Upper Extremities Muscle Tone Assessment   associated movements noted  -NT      RLE Muscle Tone Assessment   mildly decreased tone  -NT      Bed Mobility, Assessment/Treatment    Bed Mobility, Comment  UIC  -AC       Transfer Assessment/Treatment    Transfers, Sit-Stand [place]  verbal cues required;contact guard assist;2 person assist required  -AC       Transfers, Stand-Sit [place]  verbal cues required;contact guard assist;2 person assist required  -AC       Transfers, Sit-Stand-Sit, Assist Device  rolling walker  -AC       Transfer, Safety Issues  weight-shifting ability decreased  -AC       Transfer, Impairments  strength decreased;impaired balance  -AC       Transfer, Comment  VCs for hand placement and to step R leg out when sititng  -AC       Functional Mobility    Functional Mobility- Ind. Level  verbal cues required;contact guard assist  -AC       Functional Mobility- Device  rolling walker  -       Functional Mobility-Distance (Feet)  180  -AC       Functional Mobility- Comment  followed with chair  -AC       Lower Body Bathing Assessment/Training    LB Bathing Assess/Train, Comment  verbalized understanding of LH sponge to complete LBB  -AC       Lower Body Dressing Assessment/Training    LB Dressing Assess/Train, Clothing Type  doffing:;donning:;slipper socks  -AC       LB Dressing Assess/Train, Assist Device  reacher;sock-aid  -AC       LB Dressing Assess/Train, Position  sitting  -AC       LB Dressing  Assess/Train, Comment  Vcs to doff sock; min A to serene  -AC       Sensory Assessment/Intervention    RLE Light Touch   WNL  -NT      General Therapy Interventions    Planned Therapy Interventions  adaptive equipment training;ADL retraining;balance training;bed mobility training;transfer training  -       Positioning and Restraints    Pre-Treatment Position  sitting in chair/recliner  -AC       Post Treatment Position  chair  -AC       In Chair  sitting;with PT  -AC         12/06/17 0222 12/06/17 0007 12/05/17 2135 12/05/17 1730 12/05/17 1530    Rehab Evaluation    Document Type    evaluation  -     Subjective Information    agree to therapy;complains of;numbness;pain   c/o mild residual numbness from spinal block  -MJ     Patient Effort, Rehab Treatment    good  -     Symptoms Noted During/After Treatment    fatigue  -     General Information    Patient Profile Review    yes  -MJ     Onset of Illness/Injury or Date of Surgery Date    12/05/17  -     Referring Physician    MD Albin  -     General Observations    Pt supine in bed, IV, SCDs. Family present  -     Pertinent History Of Current Problem    Pt presents for surgical management of R hip pain and dysfunction that failed to improve with conservative management  -MJ     Precautions/Limitations    fall precautions;hip precautions- right  -     Prior Level of Function    min assist:;all household mobility;community mobility;gait;transfer;bed mobility   Pain increased with activity  -MJ     Equipment Currently Used at Home    walker, rolling;cane, straight;shower chair;grab bar   owns, was not using  -MJ none  -NT    Plans/Goals Discussed With    patient and family;agreed upon  -MJ     Risks Reviewed    patient and family:;LOB;increased discomfort  -MJ     Benefits Reviewed    patient and family:;improve function;increase independence;increase strength;increase balance;decrease pain  -MJ     Barriers to Rehab    none identified  -MJ     Living  Environment    Lives With    child(joan), adult  -MJ     Living Arrangements    house  -MJ     Home Accessibility    stairs to enter home   walk-in shower  -MJ     Number of Stairs to Enter Home    2  -MJ     Stair Railings at Home    none  -MJ     Living Environment Comment    Pt reports 2 sons and sister will be available to assist at discharge  -MJ     Functional Level Prior    Ambulation     0-->independent  -NT    Transferring     0-->independent  -NT    Toileting     0-->independent  -NT    Bathing     0-->independent  -NT    Dressing     0-->independent  -NT    Eating     0-->independent  -NT    Communication     0-->understands/communicates without difficulty  -NT    Swallowing     0-->swallows foods/liquids without difficulty  -NT    Prior Functional Level Comment     no  -NT    Pain Assessment    Pain Assessment    0-10  -MJ     Pain Score 0  -KL (r) LD (t) KL (c) 8  -KL (r) LD (t) KL (c) 8  -KL (r) LD (t) KL (c) 3  -MJ     Post Pain Score    3  -MJ     Pain Type    Acute pain  -MJ     Pain Location    Hip  -MJ     Pain Orientation    Right  -MJ     Pain Intervention(s)    Repositioned;Ambulation/increased activity;Cold pack  -MJ     Vision Assessment/Intervention    Visual Impairment    WFL with corrective lenses  -MJ     Cognitive Assessment/Intervention    Current Cognitive/Communication Assessment    functional  -MJ     Orientation Status    oriented x 4  -MJ     Follows Commands/Answers Questions    100% of the time;able to follow multi-step instructions;needs cueing  -MJ     Personal Safety    WNL/WFL  -MJ     ROM (Range of Motion)    General ROM    lower extremity range of motion deficits identified  -MJ     MMT (Manual Muscle Testing)    General MMT Assessment    lower extremity strength deficits identified  -MJ     Muscle Tone Assessment    Muscle Tone Assessment     Bilateral Upper Extremities;Bilateral Lower Extremities;LLE;RLE  -NT    Bilateral Upper Extremities Muscle Tone Assessment      associated movements noted  -NT    Bilateral Lower Extremities Muscle Tone Assessment     mildly decreased tone  -NT    Mobility Assessment/Training    Extremity Weight-Bearing Status    right lower extremity  -MJ     Right Lower Extremity Weight-Bearing    weight-bearing as tolerated  -MJ     Bed Mobility, Assessment/Treatment    Bed Mobility, Assistive Device    bed rails;head of bed elevated  -MJ     Bed Mob, Supine to Sit, [place]    contact guard assist;verbal cues required  -MJ     Bed Mob, Sit to Supine, [place]    not tested   Pt UIC  -MJ     Bed Mobility, Safety Issues    decreased use of legs for bridging/pushing  -MJ     Bed Mobility, Impairments    ROM decreased;strength decreased;pain  -MJ     Bed Mobility, Comment    Verbal cues to move LEs off EOB and to use UEs to push trunk to sitting. CGA with R LE, increased time and effort to perform  -MJ     Transfer Assessment/Treatment    Transfers, Sit-Stand [place]    contact guard assist;2 person assist required;verbal cues required  -MJ     Transfers, Stand-Sit [place]    contact guard assist;2 person assist required;verbal cues required  -MJ     Transfers, Sit-Stand-Sit, Assist Device    rolling walker  -MJ     Transfer, Safety Issues    sequencing ability decreased;step length decreased;weight-shifting ability decreased  -MJ     Transfer, Impairments    ROM decreased;strength decreased;pain  -MJ     Transfer, Comment    Verbal cues to push up from bed with UEs to stand and to reach back for chair to lower into sitting. Cues to step R LE out prior to t/f  -MJ     Therapy Exercises    Exercise Protocols    total hip  -MJ     Total Hip Exercises    right:;10 reps;ankle pumps/circles;quad set;glut set  -MJ     Sensory Assessment/Intervention    Light Touch   RLE  -KL (r) LD (t) KL (c) RLE  -MJ     RLE Light Touch   mild impairment   reports some numbness on outside of foot  -KL (r) LD (t) KL (c) moderate impairment   reports mild  numbness; can actively DF  -MJ     Positioning and Restraints    Pre-Treatment Position    in bed  -MJ     Post Treatment Position    chair  -MJ     In Chair    notified nsg;reclined;call light within reach;encouraged to call for assist;with family/caregiver  -MJ     General LE Assessment    ROM Detail    R hip AROM impaired 25%  -MJ     Lower Extremity    Lower Ext Manual Muscle Testing Detail    R LE 4-/5; L LE 4+/5  -MJ       12/05/17 1028                Living Environment    Lives With child(joan), adult  -MY        Living Arrangements house  -MY        Home Accessibility no concerns  -MY        Type of Financial/Environmental Concern none  -MY        Transportation Available car;family or friend will provide  -MY        Functional Level Prior    Ambulation 0-->independent  -MY        Transferring 0-->independent  -MY        Toileting 0-->independent  -MY        Bathing 0-->independent  -MY        Dressing 0-->independent  -MY        Eating 0-->independent  -MY        Communication 0-->understands/communicates without difficulty  -MY        Swallowing 0-->swallows foods/liquids without difficulty  -MY          User Key  (r) = Recorded By, (t) = Taken By, (c) = Cosigned By    Initials Name Effective Dates    AC Carolina Kraus, OT 06/23/15 -     LR Alethea Chamorro, PT 06/19/15 -     MY Kenya Peraza, THIERRY 06/16/16 -     MJ Charly Belle, PT 03/14/16 -     KL Shaw Hudson, THIERRY 06/16/16 -     NT Macie Ramos, RN 03/31/17 -     LD Lorraine C Dennie, Nursing Student 09/06/17 -            Occupational Therapy Education     Title: PT OT SLP Therapies (Active)     Topic: Occupational Therapy (Active)     Point: ADL training (Done)    Description: Instruct learner(s) on proper safety adaptation and remediation techniques during self care or transfers.   Instruct in proper use of assistive devices.    Learning Progress Summary    Learner Readiness Method Response Comment Documented by Status   Patient  Acceptance E,D,TB VU,NR Educated in hip precautions and use of AE for LBD  12/06/17 0951 Done                      User Key     Initials Effective Dates Name Provider Type Discipline     06/23/15 -  Carolina Kraus, OT Occupational Therapist OT                  OT Recommendation and Plan  Anticipated Discharge Disposition: home with assist, home with home health  Planned Therapy Interventions: adaptive equipment training, ADL retraining, balance training, bed mobility training, transfer training  Therapy Frequency: daily  Plan of Care Review  Plan Of Care Reviewed With: patient  Outcome Summary/Follow up Plan: OT eval complete.  Pt educated in hip precautions and use of AE for LBD.  Pt doffed socks using reacher given verbal cues, and min A to serene socks with sockaide.  Pt CGA STS , and to ambulate 180ft with RW.  OT will follow to advance toward increased independence with self care.  Recommend home with assist and HHOT upon d/c.           OT Goals       12/06/17 1032          Bed Mobility OT LTG    Bed Mobility OT LTG, Date Established 12/06/17  -      Bed Mobility OT LTG, Time to Achieve by discharge  -AC      Bed Mobility OT LTG, Activity Type supine to sit/sit to supine  -AC      Bed Mobility OT LTG, [place] Level supervision required  -AC      Bed Mobility OT LTG, Assist Device leg   -AC      Transfer Training 2 OT STG    Transfer Training 2 OT STG, Date Established 12/06/17  -AC      Transfer Training 2 OT STG, Time to Achieve by discharge  -AC      Transfer Training 2 OT STG, Activity Type bed to chair /chair to bed;toilet  -AC      Transfer Training 2 OT STG, [place] Level supervision required  -AC      Transfer Training 2 OT STG, Assist Device walker, rolling  -AC      Patient Education OT LTG    Patient Education OT LTG, Date Established 12/06/17  -      Patient Education OT LTG, Time to Achieve by discharge  -AC      Patient Education OT LTG, Education Type precautions per  surgeon;adaptive equipment mgmt  -      Patient Education OT LTG, Education Understanding demonstrates adequately;verbalizes understanding  -AC      LB Dressing OT LTG    LB Dressing Goal OT LTG, Date Established 12/06/17  -AC      LB Dressing Goal OT LTG, Time to Achieve by discharge  -AC      LB Dressing Goal OT LTG, [place] Level verbal cues required;supervision required  -AC      LB Dressing Goal OT LTG, Adaptive Equipment reacher;shoe horn, long handled;sock-aid  -AC        User Key  (r) = Recorded By, (t) = Taken By, (c) = Cosigned By    Initials Name Provider Type    RAFA Kraus OT Occupational Therapist                Outcome Measures       12/06/17 0839 12/05/17 1730       How much help from another person do you currently need...    Turning from your back to your side while in flat bed without using bedrails?  3  -MJ     Moving from lying on back to sitting on the side of a flat bed without bedrails?  3  -MJ     Moving to and from a bed to a chair (including a wheelchair)?  3  -MJ     Standing up from a chair using your arms (e.g., wheelchair, bedside chair)?  3  -MJ     Climbing 3-5 steps with a railing?  1  -MJ     To walk in hospital room?  2  -MJ     AM-PAC 6 Clicks Score  15  -MJ     How much help from another is currently needed...    Putting on and taking off regular lower body clothing? 3  -AC      Bathing (including washing, rinsing, and drying) 3  -AC      Toileting (which includes using toilet bed pan or urinal) 3  -AC      Putting on and taking off regular upper body clothing 4  -AC      Taking care of personal grooming (such as brushing teeth) 4  -AC      Eating meals 4  -AC      Score 21  -AC      Functional Assessment    Outcome Measure Options AM-PAC 6 Clicks Daily Activity (OT)  -AC AM-PAC 6 Clicks Basic Mobility (PT)  -       User Key  (r) = Recorded By, (t) = Taken By, (c) = Cosigned By    Initials Name Provider Type    RAFA Kraus, ALISSON Occupational Therapist    MJ  Charly Belle, PT Physical Therapist          Time Calculation:   OT Start Time: 0839    Therapy Charges for Today     Code Description Service Date Service Provider Modifiers Qty    27215181568  OT EVAL MOD COMPLEXITY 3 12/6/2017 Carolina Kraus OT GO 1    [id number]  OT THERAPEUTIC ACT EA 15 MIN 12/6/2017 Carolina Kraus OT GO 1               Carolina Kraus OT  12/6/2017

## 2017-12-06 NOTE — PROGRESS NOTES
Discharge Planning Assessment  [facility]     Patient Name: Maria Eugenia Sandoval  MRN: [id number]  Today's Date: 12/6/2017    Admit Date: 12/5/2017          Discharge Needs Assessment       12/06/17 1054    Living Environment    Lives With child(joan), adult    Living Arrangements house    Home Accessibility stairs to enter home    Stair Railings at Home none    Type of Financial/Environmental Concern none    Transportation Available car;family or friend will provide    Living Environment    Provides Primary Care For no one, unable/limited ability to care for self    Quality Of Family Relationships supportive;involved;helpful    Able to Return to Prior Living Arrangements yes    Discharge Needs Assessment    Concerns To Be Addressed discharge planning concerns    Readmission Within The Last 30 Days no previous admission in last 30 days    Anticipated Changes Related to Illness inability to care for self    Equipment Currently Used at Home cane, straight;shower chair;walker, rolling;grab bar    Equipment Needed After Discharge commode    Discharge Disposition still a patient            Discharge Plan       12/06/17 1058    Case Management/Social Work Plan    Plan Home with [facility]    Patient/Family In Agreement With Plan yes    Additional Comments Met with patient and son at bedside. Patient already has straight cane, rolling walker, shower seat, and grab bars in bath area. Patient does need a bedside commode. CM spoke with Joie at [place] and they will bring to patient's room. Home health set up through [facility]. CM called referral to Hao with [facility]. Patient has family present to take her home upon discharge. Peggy Bonlila RN [phone].        Discharge Placement     No information found        Expected Discharge Date and Time     Expected Discharge Date Expected Discharge Time    Dec 6, 2017               Demographic Summary       12/06/17 1049    Referral Information    Arrived From admitted as an inpatient    Referral Source  admission list    Reason For Consult discharge planning    Record Reviewed medical record    Contact Information    Permission Granted to Share Information With     Primary Care Physician Information    Name Ravi Walton MD            Functional Status       12/06/17 1050    Functional Status Prior    Ambulation 0-->independent    Transferring 0-->independent    Toileting 0-->independent    Bathing 0-->independent    Dressing 0-->independent    Eating 0-->independent    Communication 0-->understands/communicates without difficulty    Swallowing 0-->swallows foods/liquids without difficulty    IADL    Medications independent    Meal Preparation independent    Housekeeping independent    Laundry independent    Shopping independent    Oral Care independent    Activity Tolerance    Current Activity Limitations partial weight bearing, lower extremity right    Usual Activity Tolerance good    Current Activity Tolerance moderate    Employment/Financial    Employment/Finance Comments [employer] P[employer]; Patient states she can afford all of her medications.            Psychosocial     None            Abuse/Neglect     None            Legal     None            Substance Abuse     None            Patient Forms     None          Peggy Bonilla, RN

## 2017-12-06 NOTE — THERAPY DISCHARGE NOTE
Acute Care - Physical Therapy Treatment Note/Discharge   [place]     Patient Name: Maria Eugenia Sandoval  : 1955  MRN: [id number]  Today's Date: 2017  Onset of Illness/Injury or Date of Surgery Date: 17  Date of Referral to PT: 17  Referring Physician: MD Albin    Admit Date: 2017    Visit Dx:    ICD-10-CM ICD-9-CM   1. Impaired functional mobility, balance, gait, and endurance Z74.09 V49.89   2. Primary osteoarthritis of right hip M16.11 715.15   3. Impaired mobility and ADLs Z74.09 799.89   4. Status post total replacement of right hip Z96.641 V43.64     Patient Active Problem List   Diagnosis   • Primary osteoarthritis of right hip   • Status post total replacement of right hip   • Hypothyroid   • Tobacco abuse       Physical Therapy Education     Title: PT OT SLP Therapies (Active)     Topic: Physical Therapy (Done)     Point: Mobility training (Done)    Learning Progress Summary    Learner Readiness Method Response Comment Documented by Status   Patient Acceptance KARLA GALEANA,COLTEN MYERS Issued and reviewed written/illustrated HEP. Educated on correct car t/f technique and correct stair training.  17 1124 Done    Acceptance KARLA GALEANA NR Reviewed hip precautions, effects of spinal, benefits of mobility  17 1803 Done   Family Acceptance KARLA GALEANA NR Reviewed hip precautions, effects of spinal, benefits of mobility  17 1803 Done               Point: Home exercise program (Done)    Learning Progress Summary    Learner Readiness Method Response Comment Documented by Status   Patient Acceptance KARLA GALEANA H VU, DU Issued and reviewed written/illustrated HEP. Educated on correct car t/f technique and correct stair training.  17 1124 Done               Point: Body mechanics (Done)    Learning Progress Summary    Learner Readiness Method Response Comment Documented by Status   Patient Acceptance KARLA GALEANA H VU, DU Issued and reviewed written/illustrated HEP. Educated on correct car t/f technique  and correct stair training. LR 12/06/17 1124 Done    Acceptance ED MAURA,NATASHA Reviewed hip precautions, effects of spinal, benefits of mobility  12/05/17 1803 Done   Family Acceptance LISSETTD NATASHA LORENZO Reviewed hip precautions, effects of spinal, benefits of mobility  12/05/17 1803 Done               Point: Precautions (Done)    Learning Progress Summary    Learner Readiness Method Response Comment Documented by Status   Patient Acceptance E,D,H COLTEN LORENZO Issued and reviewed written/illustrated HEP. Educated on correct car t/f technique and correct stair training. LR 12/06/17 1124 Done    Acceptance LISSETTD MAURA,NATASHA Reviewed hip precautions, effects of spinal, benefits of mobility  12/05/17 1803 Done   Family Acceptance KARLA GALEANA NR Reviewed hip precautions, effects of spinal, benefits of mobility  12/05/17 1803 Done                      User Key     Initials Effective Dates Name Provider Type Discipline    LR 06/19/15 -  Alethea Chamorro, PT Physical Therapist PT     03/14/16 -  Charly Belle, PT Physical Therapist PT                    IP PT Goals       12/06/17 0844 12/05/17 1804       Bed Mobility PT LTG    Bed Mobility PT LTG, Date Established 12/05/17  -LR 12/05/17  -MJ     Bed Mobility PT LTG, Time to Achieve 3 days  -LR 3 days  -MJ     Bed Mobility PT LTG, Activity Type supine to sit/sit to supine  -LR supine to sit/sit to supine  -MJ     Bed Mobility PT LTG, [place] Level conditional independence  -LR conditional independence  -MJ     Bed Mobility PT LTG, Date Goal Reviewed 12/06/17  -LR 12/05/17  -MJ     Bed Mobility PT LTG, Outcome goal not met  -LR goal ongoing  -MJ     Bed Mobility PT LTG, Reason Goal Not Met discharged from facility  -LR      Transfer Training PT LTG    Transfer Training PT LTG, Date Established 12/05/17  -LR 12/05/17  -MJ     Transfer Training PT LTG, Time to Achieve 3 days  -LR 3 days  -MJ     Transfer Training PT LTG, Activity Type sit to stand/stand to sit  -LR sit to stand/stand to sit   -MJ     Transfer Training PT LTG, [place] Level conditional independence  -LR conditional independence  -MJ     Transfer Training PT LTG, Assist Device walker, rolling  -LR walker, rolling  -MJ     Transfer Training PT  LTG, Date Goal Reviewed 12/06/17  -LR 12/05/17  -MJ     Transfer Training PT LTG, Outcome goal not met  -LR goal ongoing  -MJ     Transfer Training PT LTG, Reason Goal Not Met discharged from facility  -LR      Gait Training PT LTG    Gait Training Goal PT LTG, Date Established 12/06/17  -LR 12/05/17  -MJ     Gait Training Goal PT LTG, Time to Achieve 3 days  -LR 3 days  -MJ     Gait Training Goal PT LTG, [place] Level conditional independence  -LR conditional independence  -MJ     Gait Training Goal PT LTG, Assist Device walker, rolling  -LR walker, rolling  -MJ     Gait Training Goal PT LTG, Distance to Achieve 500 feet  - feet  -MJ     Gait Training Goal PT LTG, Date Goal Reviewed 12/06/17  -LR 12/05/17  -MJ     Gait Training Goal PT LTG, Outcome goal not met  -LR goal ongoing  -MJ     Gait Training Goal PT LTG, Reason Goal Not Met discharged from facility  -LR      Stair Training PT LTG    Stair Training Goal PT LTG, Date Established 12/05/17  -LR 12/05/17  -MJ     Stair Training Goal PT LTG, Time to Achieve 3 days  -LR 3 days  -MJ     Stair Training Goal PT LTG, Number of Steps 2  -LR 2  -MJ     Stair Training Goal PT LTG, [place] Level contact guard assist  -LR contact guard assist  -MJ     Stair Training Goal PT LTG, Assist Device cane, straight;other (see comments)   HHA  -LR cane, straight   +HHA  -MJ     Stair Training Goal PT LTG, Date Goal Reviewed 12/06/17  -LR 12/05/17  -MJ     Stair Training Goal PT LTG, Outcome goal not met  -LR goal ongoing  -MJ     Stair Training Goal PT LTG, Reason Goal Not Met discharged from facility  -LR        User Key  (r) = Recorded By, (t) = Taken By, (c) = Cosigned By    Initials Name Provider Type    LR Alethea Chamorro, PT  Physical Therapist    BEBETO Belle, PT Physical Therapist              Adult Rehabilitation Note       12/06/17 0844          Rehab Assessment/Intervention    Discipline physical therapist  -LR      Document Type therapy note (daily note);discharge summary  -LR      Subjective Information agree to therapy;complains of;pain  -LR      Patient Effort, Rehab Treatment good  -LR      Symptoms Noted During/After Treatment fatigue  -LR      Precautions/Limitations fall precautions;hip precautions- right  -LR      Recorded by [LR] Alethea Chamorro, PT      Pain Assessment    Pain Assessment 0-10  -LR      Pain Score 6  -LR      Post Pain Score 5  -LR      Pain Type Acute pain  -LR      Pain Location Hip  -LR      Pain Orientation Right  -LR      Pain Intervention(s) Repositioned;Ambulation/increased activity;Medication (See MAR)  -LR      Recorded by [LR] Alethea Chamorro, PT      Cognitive Assessment/Intervention    Current Cognitive/Communication Assessment functional  -LR      Orientation Status oriented x 4;required verbal cueing (specifiy in comments)  -LR      Follows Commands/Answers Questions 100% of the time;able to follow single-step instructions;needs cueing;needs repetition  -LR      Personal Safety WNL/WFL  -LR      Recorded by [LR] Alethea Chamorro, PT      Mobility Assessment/Training    Extremity Weight-Bearing Status right lower extremity  -LR      Right Lower Extremity Weight-Bearing weight-bearing as tolerated  -LR      Recorded by [LR] Alethea Chamorro, PT      Bed Mobility, Assessment/Treatment    Bed Mob, Supine to Sit, [place] not tested   UIC on arrival.   -LR      Bed Mob, Sit to Supine, [place] not tested   UIC at end of treatment.   -LR      Recorded by [LR] Alethea Chamorro, PT      Transfer Assessment/Treatment    Transfers, Sit-Stand [place] verbal cues required;contact guard assist;2 person assist required  -LR      Transfers, Stand-Sit [place]  verbal cues required;contact guard assist;2 person assist required  -LR      Transfers, Sit-Stand-Sit, Assist Device rolling walker  -LR      Transfer, Safety Issues sequencing ability decreased;step length decreased;weight-shifting ability decreased  -LR      Transfer, Impairments ROM decreased;strength decreased;pain  -LR      Transfer, Comment Verbal cues for correct hand placement with t/f and to step R LE out before t/f to avoid flexing at hips past 90 degrees during t/f.   -LR      Recorded by [LR] Alethea Chamorro, PT      Gait Assessment/Treatment    Gait, [place] Level verbal cues required;contact guard assist  -LR      Gait, Assistive Device rolling walker  -LR      Gait, Distance (Feet) 180  -LR      Gait, Gait Pattern Analysis swing-to gait  -LR      Gait, Gait Deviations right:;antalgic;forward flexed posture;step length decreased;toe-to-floor clearance decreased;weight-shifting ability decreased  -LR      Gait, Safety Issues sequencing ability decreased;step length decreased;weight-shifting ability decreased  -LR      Gait, Impairments ROM decreased;strength decreased;pain  -LR      Gait, Comment Patient ambulated with step to gait pattern at slow pace. Verbal cues for increased R LE weight bearing and decreased UE weight bearing. Having difficulty initially with clearing R foot. Improved with cues for increased R knee flexion and R ankle DF. Gait limited by fatigue and pain.   -LR      Recorded by [LR] Alethea Chamorro, PT      Stairs Assessment/Treatment    Number of Stairs 3  -LR      Stairs, Handrail Location none  -LR      Stairs, [place] Level verbal cues required;2 person assist required;contact guard assist  -LR      Stairs, Assistive Device straight cane  -LR      Stairs, Technique Used step to step (ascending);step to step (descending)  -LR      Stairs, Safety Issues sequencing ability decreased;weight-shifting ability decreased  -LR      Stairs, Impairments ROM  decreased;strength decreased;pain  -LR      Stairs, Comment Verbal cues to take one step at a time and to step up with strong LE first and down with weak LE first. Verbal cues for correct sequencing and placement of SPC. SPC on L and HHA on R. Patient had less difficulty with descending compared to ascending. Issued gait belt for use during stair training at home.   -LR      Recorded by [LR] Alethea Chamorro, PT      Therapy Exercises    Exercise Protocols total hip  -LR      Total Hip Exercises right:;15 reps;completed protocol;with assist;ankle pumps/circles;quad set;glut set;heel slides;hip abduction;SAQ;LAQ;SLR   cues for technique;min assist hip abd,SLR, heel slides  -LR      Recorded by [LR] Alethea Chamorro, PT      Sensory Assessment/Intervention    Sensory Impairment --   denies numbness/tingling;able to actively DF bilaterally  -LR      Recorded by [LR] Alethea Chamorro, PT      Positioning and Restraints    Pre-Treatment Position sitting in chair/recliner  -LR      Post Treatment Position chair  -LR      In Chair notified nsg;reclined;sitting;call light within reach;encouraged to call for assist;with family/caregiver;legs elevated  -LR      Recorded by [LR] Alethea Chamorro, PT        User Key  (r) = Recorded By, (t) = Taken By, (c) = Cosigned By    Initials Name Effective Dates    LR Alethea Chamorro, PT 06/19/15 -           PT Recommendation and Plan  Anticipated Equipment Needs At Discharge: bedside commode  Anticipated Discharge Disposition: home with assist, home with home health  Demonstrates Need for Referral to Another Service: home health care  Planned Therapy Interventions: balance training, bed mobility training, gait training, home exercise program, patient/family education, stair training, strengthening, transfer training  PT Frequency: 2 times/day  Plan of Care Review  Plan Of Care Reviewed With: patient  Progress: progress toward functional goals as  expected  Outcome Summary/Follow up Plan: Patient ambulated 180 feet with step to gait pattern, limited by pain. Completed stair training to simulate home environment. Issued and reviewed written/illustrated HEP. Patient ahs been d /c home with HHPT.           Outcome Measures       12/06/17 0844 12/06/17 0839 12/05/17 1730    How much help from another person do you currently need...    Turning from your back to your side while in flat bed without using bedrails? 3  -LR  3  -MJ    Moving from lying on back to sitting on the side of a flat bed without bedrails? 3  -LR  3  -MJ    Moving to and from a bed to a chair (including a wheelchair)? 3  -LR  3  -MJ    Standing up from a chair using your arms (e.g., wheelchair, bedside chair)? 3  -LR  3  -MJ    Climbing 3-5 steps with a railing? 3  -LR  1  -MJ    To walk in hospital room? 3  -LR  2  -MJ    AM-PAC 6 Clicks Score 18  -LR  15  -MJ    How much help from another is currently needed...    Putting on and taking off regular lower body clothing?  3  -AC     Bathing (including washing, rinsing, and drying)  3  -AC     Toileting (which includes using toilet bed pan or urinal)  3  -AC     Putting on and taking off regular upper body clothing  4  -AC     Taking care of personal grooming (such as brushing teeth)  4  -AC     Eating meals  4  -AC     Score  21  -AC     Functional Assessment    Outcome Measure Options AM-PAC 6 Clicks Basic Mobility (PT)  -LR AM-PAC 6 Clicks Daily Activity (OT)  -AC AM-PAC 6 Clicks Basic Mobility (PT)  -MJ      User Key  (r) = Recorded By, (t) = Taken By, (c) = Cosigned By    Initials Name Provider Type    AC Carolina Kraus, OT Occupational Therapist    LR Alethea Chamorro, PT Physical Therapist    BEBETO Belle, PT Physical Therapist           Time Calculation:         PT Charges       12/06/17 1125          Time Calculation    Start Time 0844  -LR      PT Received On 12/06/17  -LR      PT Goal Re-Cert Due Date 12/15/17  -LR      Time  Calculation- PT    Total Timed Code Minutes- PT 27 minute(s)  -LR        User Key  (r) = Recorded By, (t) = Taken By, (c) = Cosigned By    Initials Name Provider Type    LR Alethea Chamorro, PT Physical Therapist          Therapy Charges for Today     Code Description Service Date Service Provider Modifiers Qty    45701386375 HC GAIT TRAINING EA 15 MIN 12/6/2017 Alethea Chamorro, PT GP 1    29578345278 HC PT THER PROC EA 15 MIN 12/6/2017 Alethea Chamorro, PT GP 1          PT G-Codes  Outcome Measure Options: AM-PAC 6 Clicks Basic Mobility (PT)    PT Discharge Summary  Anticipated Discharge Disposition: home with assist, home with home health  Reason for Discharge: Discharge from facility  Outcomes Achieved: Patient able to partially acheive established goals  Discharge Destination: Home with assist, Home with home health    Alethea Chamorro, PT  12/6/2017

## 2017-12-06 NOTE — PROGRESS NOTES
"      [facility] [place] [facility] [place]    Subjective      LOS: 0 days   Patient Care Team:  Maria Dolores Huerta MD as PCP - General (Obstetrics and Gynecology)    Interval History:   Resting comfortably in bed.  Pain is controlled.    Objective      Vital Signs  Temp (24hrs), Av.6 °F (36.4 °C), Min:97 °F (36.1 °C), Max:97.8 °F (36.6 °C)      /58 (BP Location: Right arm, Patient Position: Sitting)  Pulse 59  Temp 97 °F (36.1 °C) (Temporal Artery )   Resp 16  Ht 177.8 cm (70\")  Wt 75.8 kg (167 lb 1.7 oz)  SpO2 96%  BMI 23.98 kg/m2    Examination:   Examination right hip: The dressing is clean, dry, and intact.  Thigh is soft and nontender.  Ankle dorsiflexion, ankle plantar flexion, and EHL are intact.  Sensation intact in the foot to light touch.  Prompt capillary refill.    Labs:        Radiology:  Imaging Results (last 24 hours)     Procedure Component Value Units Date/Time    XR Hip With or Without Pelvis 2 - 3 View Right [[id number]] Updated:  17 1433          PT:  Gait, Distance (Feet): 10     Results Review:     I reviewed the patient's new clinical results.    Assessment and Plan     Assessment:   Status post right total hip arthroplasty-doing well    Principal Problem:    Status post total replacement of right hip  Active Problems:    Primary osteoarthritis of right hip    Hypothyroid    Tobacco abuse      Plan for disposition: Plan for discharge to home in 1-2 days.  Follow-up with me as planned (2017).      Marek Talamantes MD  17  8:52 PM    "

## 2017-12-06 NOTE — PROGRESS NOTES
"      [facility] [place] [facility] Progress Note    Subjective      LOS: 1 day   Patient Care Team:  Maria Dolores Huerta MD as PCP - General (Obstetrics and Gynecology)    Interval History:   Resting comfortably in bed.  Pain is controlled.    Objective      Vital Signs  Temp (24hrs), Av.6 °F (36.4 °C), Min:97 °F (36.1 °C), Max:98.1 °F (36.7 °C)      /66 (BP Location: Right arm, Patient Position: Lying)  Pulse 60  Temp 98.1 °F (36.7 °C) (Temporal Artery )   Resp 18  Ht 177.8 cm (70\")  Wt 75.8 kg (167 lb 1.7 oz)  SpO2 98%  BMI 23.98 kg/m2    Examination:   Examination right hip: The wound is clean, dry, and intact.  Thigh is soft and nontender.  Ankle dorsiflexion, ankle plantar flexion, and EHL are intact.  Sensation intact in the foot to light touch.  Prompt capillary refill.    Labs:    Results from last 7 days  Lab Units 17  0515   WBC 10*3/mm3 7.92   RBC 10*6/mm3 3.49*   HEMOGLOBIN g/dL 11.5   HEMATOCRIT % 35.5   PLATELETS 10*3/mm3 207       PT:  Gait, Distance (Feet): 10     Results Review:     I reviewed the patient's new clinical results.    Assessment and Plan     Assessment:   Status post right total hip arthroplasty-doing well    Principal Problem:    Status post total replacement of right hip  Active Problems:    Primary osteoarthritis of right hip    Hypothyroid    Tobacco abuse      Plan for disposition: Plan for discharge to home possibly later today if she does well with physical therapy. Follow-up with me as planned (2017).      Marek Talamantes MD  17  7:54 AM    "

## 2017-12-06 NOTE — H&P
Patient Name: Maria Eugenia Sandoval  MRN: [id number]  : 1955  DOS: 2017    Attending: Gil Lerma MD    Primary Care Provider: Ravi Walton MD    Date of Admission:.2017  9:30 AM    Date of Discharge:  2017    Discharge Diagnosis: Principal Problem:    Status post total replacement of right hip  Active Problems:    Primary osteoarthritis of right hip    Hypothyroid    Tobacco abuse      Hospital Course  Patient is a 62 y.o. female presented for right total hip arthroplasty by Dr. Talamantes under spinal anesthesia. She tolerated surgery well and was admitted for further medical management. Her hip has been painful for about a year. She denies use of assistive device for ambulation.      The patient has done well postop. The patient has been able to ambulate 180 feet with PT.  The patient has had good pain control with PO pain medications.   The patient was placed on DVT prophylaxis including aspirin. The patient was encouraged to use IS for atelectasis prophylaxis.   The patient was placed on a bowel regimen to prevent constipation while on pain medication.   The patient's H/H was monitored with a slight decrease that remained asymptomatic.    It is felt by all involved that the patient can discharge home at this time, and the patient has no further questions      Procedures Performed  DATE OF PROCEDURE:  17     SURGEON: Marek Talamantes MD     ASSISTANT: Danni Glasgow PA-C      PREOPERATIVE DIAGNOSIS: right hip arthritis     POSTOPERATIVE DIAGNOSIS: right hip arthritis     PROCEDURE PERFORMED: right total hip arthroplasty with DePuy components (# 52 Press-Fit [place] cup with + 4 neutral polyethylene liner with # 5 standard Offset Press-Fit [place] Stem with + 1.5 x 36 ceramic head).        Pertinent Test Results:    I reviewed the patient's new clinical results.     Results from last 7 days  Lab Units 17  0515   WBC 10*3/mm3 7.92   HEMOGLOBIN g/dL 11.5   HEMATOCRIT % 35.5  "  PLATELETS 10*3/mm3 207       Results from last 7 days  Lab Units 17  0515   SODIUM mmol/L 137   POTASSIUM mmol/L 3.7   CHLORIDE mmol/L 107   CO2 mmol/L 25.0   BUN mg/dL 9   CREATININE mg/dL 0.60   CALCIUM mg/dL 9.1   GLUCOSE mg/dL 107*     I reviewed the patient's new imaging including images and reports.      Physical therapy: Patient ambulated 180 feet with step to gait pattern, limited by pain. Completed stair training to simulate home environment. Issued and reviewed written/illustrated HEP. Patient ahs been d /c home with HHPT.     Discharge Assessment:    Vital Signs  /61 (BP Location: Right arm, Patient Position: Lying)  Pulse 66  Temp 97.2 °F (36.2 °C) (Temporal Artery )   Resp 17  Ht 177.8 cm (70\")  Wt 75.8 kg (167 lb 1.7 oz)  SpO2 100%  BMI 23.98 kg/m2  Temp (24hrs), Av.6 °F (36.4 °C), Min:97 °F (36.1 °C), Max:98.1 °F (36.7 °C)      General Appearance:    Alert, cooperative, in no acute distress   Lungs:     Clear to auscultation,respirations regular, even and                   unlabored    Heart:    Regular rhythm and normal rate, normal S1 and S2   Abdomen:     Normal bowel sounds, no masses, no organomegaly, soft        non-tender, non-distended, no guarding, no rebound                 tenderness   Extremities:   Moves all extremities well, no edema, no cyanosis, no              Redness. Right hip [place] CDI.   Pulses:   Pulses palpable and equal bilaterally   Skin:   No bleeding, bruising or rash   Neurologic:   Cranial nerves 2 - 12 grossly intact, sensation intact. Flexion and dorsiflexion intact bilateral feet.       Discharge Disposition: Home    Discharge Medications   SandovalMaria Eugenia   Home Medication Instructions TERESA:[id number]    Printed on:17 1131   Medication Information                      aspirin 325 MG tablet  Take 1 tablet by mouth Daily for 1 month             calcium carbonate (OS-YASMINE) 600 MG tablet  Take 600 mg by mouth Daily.             docusate " sodium 100 MG capsule  Take 1 capsule by mouth 2 (Two) Times a Day As Needed for Constipation.             HYDROcodone-acetaminophen (NORCO) 7.5-325 MG per tablet  Take 1 tablet by mouth Every 4 (Four) Hours As Needed for Moderate Pain  for up to 9 days.             ibuprofen (ADVIL,MOTRIN) 200 MG tablet  Take 3 tablets by mouth Every 6 (Six) Hours As Needed for Mild Pain  or Moderate Pain . Must take an hour after aspirin if needed.             levothyroxine (SYNTHROID, LEVOTHROID) 112 MCG tablet  Take 112 mcg by mouth Daily.                 Discharge Diet: regular diet    Activity at Discharge: WBAT RLE    Follow-up Appointments  Dr. Talamantes per his orders    Discharge took over 30 min.    MARÍA Odonnell  12/06/17  11:31 AM

## 2017-12-06 NOTE — DISCHARGE SUMMARY
Patient Name: Maria Eugenia Sandoval  MRN: [id number]  : 1955  DOS: 2017     Attending: Gil Lerma MD     Primary Care Provider: Ravi Walton MD     Date of Admission:.2017  9:30 AM     Date of Discharge:  2017     Discharge Diagnosis: Principal Problem:    Status post total replacement of right hip  Active Problems:    Primary osteoarthritis of right hip    Hypothyroid    Tobacco abuse        Hospital Course  Patient is a 62 y.o. female presented for right total hip arthroplasty by Dr. Talamantes under spinal anesthesia. She tolerated surgery well and was admitted for further medical management. Her hip has been painful for about a year. She denies use of assistive device for ambulation.       The patient has done well postop. The patient has been able to ambulate 180 feet with PT.  The patient has had good pain control with PO pain medications.     The patient was placed on DVT prophylaxis including aspirin. The patient was encouraged to use IS for atelectasis prophylaxis.     The patient was placed on a bowel regimen to prevent constipation while on pain medication.   The patient's H/H was monitored with a slight decrease that remained asymptomatic.     It is felt by all involved that the patient can discharge home at this time, and the patient has no further questions        Procedures Performed  DATE OF PROCEDURE:  17      SURGEON: Marek Talamantes MD      ASSISTANT: Danni Glasgow PA-C       PREOPERATIVE DIAGNOSIS: right hip arthritis      POSTOPERATIVE DIAGNOSIS: right hip arthritis      PROCEDURE PERFORMED: right total hip arthroplasty with DePuy components (# 52 Press-Fit [place] cup with + 4 neutral polyethylene liner with # 5 standard Offset Press-Fit Canadian Stem with + 1.5 x 36 ceramic head).         Pertinent Test Results:                         I reviewed the patient's new clinical results.     Results from last 7 days  Lab Units 17  0515   WBC 10*3/mm3 7.92  "  HEMOGLOBIN g/dL 11.5   HEMATOCRIT % 35.5   PLATELETS 10*3/mm3 207        Results from last 7 days  Lab Units 17  0515   SODIUM mmol/L 137   POTASSIUM mmol/L 3.7   CHLORIDE mmol/L 107   CO2 mmol/L 25.0   BUN mg/dL 9   CREATININE mg/dL 0.60   CALCIUM mg/dL 9.1   GLUCOSE mg/dL 107*      I reviewed the patient's new imaging including images and reports.        Physical therapy: Patient ambulated 180 feet with step to gait pattern, limited by pain. Completed stair training to simulate home environment. Issued and reviewed written/illustrated HEP. Patient ahs been d /c home with HHPT.      Discharge Assessment:    Vital Signs  /61 (BP Location: Right arm, Patient Position: Lying)  Pulse 66  Temp 97.2 °F (36.2 °C) (Temporal Artery )   Resp 17  Ht 177.8 cm (70\")  Wt 75.8 kg (167 lb 1.7 oz)  SpO2 100%  BMI 23.98 kg/m2  Temp (24hrs), Av.6 °F (36.4 °C), Min:97 °F (36.1 °C), Max:98.1 °F (36.7 °C)        General Appearance:    Alert, cooperative, in no acute distress   Lungs:     Clear to auscultation,respirations regular, even and                   unlabored    Heart:    Regular rhythm and normal rate, normal S1 and S2   Abdomen:     Normal bowel sounds, no masses, no organomegaly, soft        non-tender, non-distended, no guarding, no rebound                 tenderness   Extremities:   Moves all extremities well, no edema, no cyanosis, no              Redness. Right hip Prineo CDI.   Pulses:   Pulses palpable and equal bilaterally   Skin:   No bleeding, bruising or rash   Neurologic:   Cranial nerves 2 - 12 grossly intact, sensation intact. Flexion and dorsiflexion intact bilateral feet.      Discharge Disposition: Home     Discharge Medications                Maria Eugenia Sandoval   Home Medication Instructions TERESA:[id number]     Printed on:17 1131   Medication Information                                       aspirin 325 MG tablet  Take 1 tablet by mouth Daily for 1 month                   calcium " carbonate (OS-YASMINE) 600 MG tablet  Take 600 mg by mouth Daily.                   docusate sodium 100 MG capsule  Take 1 capsule by mouth 2 (Two) Times a Day As Needed for Constipation.                   HYDROcodone-acetaminophen (NORCO) 7.5-325 MG per tablet  Take 1 tablet by mouth Every 4 (Four) Hours As Needed for Moderate Pain  for up to 9 days.                   ibuprofen (ADVIL,MOTRIN) 200 MG tablet  Take 3 tablets by mouth Every 6 (Six) Hours As Needed for Mild Pain  or Moderate Pain . Must take an hour after aspirin if needed.                   levothyroxine (SYNTHROID, LEVOTHROID) 112 MCG tablet  Take 112 mcg by mouth Daily.                         Discharge Diet: regular diet     Activity at Discharge: WBAT RLE     Follow-up Appointments  Dr. Talamantes per his orders     Discharge took over 30 min.     Seen and examined by me. Agree with above. Discussed with patient. MARÍA Carmona  12/06/17  11:31 AM

## 2017-12-06 NOTE — PLAN OF CARE
Problem: Patient Care Overview (Adult)  Goal: Plan of Care Review  Outcome: Ongoing (interventions implemented as appropriate)    12/06/17 0844   Coping/Psychosocial Response Interventions   Plan Of Care Reviewed With patient   Outcome Evaluation   Outcome Summary/Follow up Plan Patient ambulated 180 feet with step to gait pattern, limited by pain. Completed stair training to simulate home environment. Issued and reviewed written/illustrated HEP. Patient ahs been d /c home with HHPT.    Patient Care Overview   Progress progress toward functional goals as expected         Problem: Hip Replacement, Total (Adult)  Goal: Signs and Symptoms of Listed Potential Problems Will be Absent or Manageable (Hip Replacement, Total)  Outcome: Ongoing (interventions implemented as appropriate)    12/06/17 0844   Hip Replacement, Total   Problems Assessed (Total Hip Replacement) pain;displacement of prosthesis;functional decline/self care deficit   Problems Present (Total Hip Replacement) pain;displacement of prosthesis;functional decline/self care deficit         Problem: Inpatient Physical Therapy  Goal: Bed Mobility Goal LTG- PT  Outcome: Unable to achieve outcome(s) by discharge Date Met:  12/06/17 12/06/17 0844   Bed Mobility PT LTG   Bed Mobility PT LTG, Date Established 12/05/17   Bed Mobility PT LTG, Time to Achieve 3 days   Bed Mobility PT LTG, Activity Type supine to sit/sit to supine   Bed Mobility PT LTG, [place] Level conditional independence   Bed Mobility PT LTG, Date Goal Reviewed 12/06/17   Bed Mobility PT LTG, Outcome goal not met   Bed Mobility PT LTG, Reason Goal Not Met discharged from facility       Goal: Transfer Training Goal 1 LTG- PT  Outcome: Unable to achieve outcome(s) by discharge Date Met:  12/06/17 12/06/17 0844   Transfer Training PT LTG   Transfer Training PT LTG, Date Established 12/05/17   Transfer Training PT LTG, Time to Achieve 3 days   Transfer Training PT LTG, Activity Type sit to  stand/stand to sit   Transfer Training PT LTG, [place] Level conditional independence   Transfer Training PT LTG, Assist Device walker, rolling   Transfer Training PT LTG, Date Goal Reviewed 12/06/17   Transfer Training PT LTG, Outcome goal not met   Transfer Training PT LTG, Reason Goal Not Met discharged from facility       Goal: Gait Training Goal LTG- PT  Outcome: Unable to achieve outcome(s) by discharge Date Met:  12/06/17 12/06/17 0844   Gait Training PT LTG   Gait Training Goal PT LTG, Date Established 12/06/17   Gait Training Goal PT LTG, Time to Achieve 3 days   Gait Training Goal PT LTG, [place] Level conditional independence   Gait Training Goal PT LTG, Assist Device walker, rolling   Gait Training Goal PT LTG, Distance to Achieve 500 feet   Gait Training Goal PT LTG, Date Goal Reviewed 12/06/17   Gait Training Goal PT LTG, Outcome goal not met   Gait Training Goal PT LTG, Reason Goal Not Met discharged from facility       Goal: Stair Training Goal LTG- PT  Outcome: Unable to achieve outcome(s) by discharge Date Met:  12/06/17 12/06/17 0844   Stair Training PT LTG   Stair Training Goal PT LTG, Date Established 12/05/17   Stair Training Goal PT LTG, Time to Achieve 3 days   Stair Training Goal PT LTG, Number of Steps 2   Stair Training Goal PT LTG, [place] Level contact guard assist   Stair Training Goal PT LTG, Assist Device cane, straight;other (see comments)  (HHA)   Stair Training Goal PT LTG, Date Goal Reviewed 12/06/17   Stair Training Goal PT LTG, Outcome goal not met   Stair Training Goal PT LTG, Reason Goal Not Met discharged from facility

## 2017-12-06 NOTE — PLAN OF CARE
Problem: Patient Care Overview (Adult)  Goal: Plan of Care Review  Outcome: Ongoing (interventions implemented as appropriate)    12/06/17 1032   Coping/Psychosocial Response Interventions   Plan Of Care Reviewed With patient   Outcome Evaluation   Outcome Summary/Follow up Plan OT rhiannaal complete. Pt educated in hip precautions and use of AE for LBD. Pt doffed socks using reacher given verbal cues, and min A to serene socks with sockaide. Pt CGA STS , and to ambulate 180ft with RW. OT will follow to advance toward increased independence with self care. Recommend home with assist and HHOT upon d/c.          Problem: Inpatient Occupational Therapy  Goal: Bed Mobility Goal LTG- OT  Outcome: Ongoing (interventions implemented as appropriate)    12/06/17 1032   Bed Mobility OT LTG   Bed Mobility OT LTG, Date Established 12/06/17   Bed Mobility OT LTG, Time to Achieve by discharge   Bed Mobility OT LTG, Activity Type supine to sit/sit to supine   Bed Mobility OT LTG, [place] Level supervision required   Bed Mobility OT LTG, Assist Device leg        Goal: Transfer Training Goal 2 STG- OT  Outcome: Ongoing (interventions implemented as appropriate)    12/06/17 1032   Transfer Training 2 OT STG   Transfer Training 2 OT STG, Date Established 12/06/17   Transfer Training 2 OT STG, Time to Achieve by discharge   Transfer Training 2 OT STG, Activity Type bed to chair /chair to bed;toilet   Transfer Training 2 OT STG, [place] Level supervision required   Transfer Training 2 OT STG, Assist Device walker, rolling       Goal: Patient Education Goal LTG- OT  Outcome: Ongoing (interventions implemented as appropriate)    12/06/17 1032   Patient Education OT LTG   Patient Education OT LTG, Date Established 12/06/17   Patient Education OT LTG, Time to Achieve by discharge   Patient Education OT LTG, Education Type precautions per surgeon;adaptive equipment mgmt   Patient Education OT LTG, Education Understanding  demonstrates adequately;verbalizes understanding       Goal: LB Dressing Goal LTG- OT  Outcome: Ongoing (interventions implemented as appropriate)    12/06/17 1032   LB Dressing OT LTG   LB Dressing Goal OT LTG, Date Established 12/06/17   LB Dressing Goal OT LTG, Time to Achieve by discharge   LB Dressing Goal OT LTG, [place] Level verbal cues required;supervision required   LB Dressing Goal OT LTG, Adaptive Equipment reacher;shoe horn, long handled;sock-aid

## 2017-12-20 ENCOUNTER — OFFICE VISIT (OUTPATIENT)
Dept: ORTHOPEDIC SURGERY | Facility: CLINIC | Age: 62
End: 2017-12-20

## 2017-12-20 DIAGNOSIS — Z96.641 STATUS POST TOTAL REPLACEMENT OF RIGHT HIP: Primary | ICD-10-CM

## 2017-12-20 DIAGNOSIS — Z47.89 ORTHOPEDIC AFTERCARE: ICD-10-CM

## 2017-12-20 PROCEDURE — 99024 POSTOP FOLLOW-UP VISIT: CPT | Performed by: ORTHOPAEDIC SURGERY

## 2017-12-20 NOTE — PROGRESS NOTES
[facility] [place] [facility]opa[facility] Surgery Clinic Note    Subjective     Chief Complaint   Patient presents with   • Post-op     2 weeks status post: Right Total Hip Arthroplasty 12/5/2017        ANITA Sandoval is a 62 y.o. female. She follows up today status post right total hip arthroplasty.  She's doing well today.  Walking with a cane.  99% improvement compared to her preoperative symptoms.      Patient Active Problem List   Diagnosis   • Primary osteoarthritis of right hip   • Status post total replacement of right hip   • Hypothyroid   • Tobacco abuse     Past Medical History:   Diagnosis Date   • Arthritis    • Cataract    • Disease of thyroid gland    • Heart disease    • Wears contact lenses    • Wears glasses       Past Surgical History:   Procedure Laterality Date   • ANKLE OPEN REDUCTION INTERNAL FIXATION     • TOTAL HIP ARTHROPLASTY Right 12/5/2017    Procedure: TOTAL HIP ARTHROPLASTY RIGHT;  Surgeon: Marek Talamantes MD;  Location: [facility];  Service:    • TUMOR REMOVAL        Family History   Problem Relation Age of Onset   • Osteoarthritis Mother    • Breast cancer Neg Hx    • Ovarian cancer Neg Hx      Social History     Social History   • Marital status: Single     Spouse name: N/A   • Number of children: N/A   • Years of education: N/A     Occupational History   • Not on file.     Social History Main Topics   • Smoking status: Current Every Day Smoker     Packs/day: 0.50     Years: 15.00     Types: Cigarettes   • Smokeless tobacco: Never Used   • Alcohol use Yes      Comment: occasional   • Drug use: No   • Sexual activity: Defer     Other Topics Concern   • Not on file     Social History Narrative      Current Outpatient Prescriptions on File Prior to Visit   Medication Sig Dispense Refill   • aspirin  MG tablet Take 1 tablet by mouth Daily. 30 tablet 0   • calcium carbonate (OS-YASMINE) 600 MG tablet Take 600 mg by mouth Daily.     • docusate sodium 100 MG capsule Take 1 capsule by mouth 2 (Two)  Times a Day As Needed for Constipation. 60 capsule 0   • ibuprofen (ADVIL,MOTRIN) 200 MG tablet Take 3 tablets by mouth Every 6 (Six) Hours As Needed for Mild Pain  or Moderate Pain . Must take an hour after aspirin if needed.     • levothyroxine (SYNTHROID, LEVOTHROID) 112 MCG tablet Take 112 mcg by mouth Daily.       No current facility-administered medications on file prior to visit.       No Known Allergies     Review of Systems   Constitutional: Negative for activity change, appetite change, chills, diaphoresis, fatigue, fever and unexpected weight change.   HENT: Negative for congestion, dental problem, drooling, ear discharge, ear pain, facial swelling, hearing loss, mouth sores, nosebleeds, postnasal drip, rhinorrhea, sinus pressure, sneezing, sore throat, tinnitus, trouble swallowing and voice change.    Eyes: Negative for photophobia, pain, discharge, redness, itching and visual disturbance.   Respiratory: Negative for apnea, cough, choking, chest tightness, shortness of breath, wheezing and stridor.    Cardiovascular: Negative for chest pain, palpitations and leg swelling.   Gastrointestinal: Negative for abdominal distention, abdominal pain, anal bleeding, blood in stool, constipation, diarrhea, nausea, rectal pain and vomiting.   Endocrine: Negative for cold intolerance, heat intolerance, polydipsia, polyphagia and polyuria.   Genitourinary: Negative for decreased urine volume, difficulty urinating, dysuria, enuresis, flank pain, frequency, genital sores, hematuria and urgency.   Musculoskeletal: Positive for arthralgias. Negative for back pain, gait problem, joint swelling, myalgias, neck pain and neck stiffness.   Skin: Negative for color change, pallor, rash and wound.   Allergic/Immunologic: Negative for environmental allergies, food allergies and immunocompromised state.   Neurological: Negative for dizziness, tremors, seizures, syncope, facial asymmetry, speech difficulty, weakness,  light-headedness, numbness and headaches.   Hematological: Negative for adenopathy. Does not bruise/bleed easily.   Psychiatric/Behavioral: Negative for agitation, behavioral problems, confusion, decreased concentration, dysphoric mood, hallucinations, self-injury, sleep disturbance and suicidal ideas. The patient is not nervous/anxious and is not hyperactive.         Objective      Physical Exam  There were no vitals taken for this visit.    There is no height or weight on file to calculate BMI.    General:   Mental Status:  Alert   Appearance: Cooperative, in no acute distress   Build and Nutrition: Well-nourished and well developed female   Orientation: Alert and oriented to person, place and time   Posture: Normal   Gait: Mildly antalgic on the right, but uses a cane.    Integument:   Right hip: Wound is well-healed with no signs of infection    Lower Extremity:   Right Hip:    Tenderness:  None    Swelling:  None    Crepitus:  None    Range of motion:  External Rotation: 30°       Internal Rotation: 30°       Flexion:  100°       Extension:  0°    Deformities:  None  Functional testing: Negative [place]    No leg length discrepancy      Imaging/Studies  Imaging Results (last 24 hours)     Procedure Component Value Units Date/Time    XR Hip With or Without Pelvis 1 View Right [[id number]] Resulted:  12/20/17 1623     Updated:  12/20/17 1623    Narrative:       Right Hip Radiographs  Indication: status-post right total hip arthroplasty  Views: low AP pelvis and lateral of the right hip    Comparison: no change compared to prior study    Findings:   The components are well aligned, with no signs of loosening or failure.            Assessment and Plan     Maria Eugenia was seen today for post-op.    Diagnoses and all orders for this visit:    Status post total replacement of right hip  -     XR Hip With or Without Pelvis 1 View Right    Orthopedic aftercare      I reviewed my findings with patient today.  Her right  total hip arthroplasty is functioning well.  I'll see her back in 6 weeks, but sooner for any promise.  No x-rays are required on the next visit.    Return in about 6 weeks (around 1/31/2018) for Recheck without X-Rays.        Marek Talamantes MD  12/20/17  4:31 PM

## 2018-01-31 ENCOUNTER — OFFICE VISIT (OUTPATIENT)
Dept: ORTHOPEDIC SURGERY | Facility: CLINIC | Age: 63
End: 2018-01-31

## 2018-01-31 DIAGNOSIS — Z47.89 ORTHOPEDIC AFTERCARE: ICD-10-CM

## 2018-01-31 DIAGNOSIS — Z96.641 STATUS POST TOTAL REPLACEMENT OF RIGHT HIP: Primary | ICD-10-CM

## 2018-01-31 PROCEDURE — 99024 POSTOP FOLLOW-UP VISIT: CPT | Performed by: ORTHOPAEDIC SURGERY

## 2018-01-31 NOTE — PROGRESS NOTES
[facility] [place] [facility] [place]    Subjective     Chief Complaint   Patient presents with   • Post-op     6 week follow up, 8 weeks status post: Right Total Hip Arthroplasty 12/5/2017        ANITA Sandoval is a 62 y.o. female. She follows up today for her right total hip arthroplasty.  She is doing well today.  Ambulating with the aid of a cane secondary to weakness.  Pain is markedly improved compared to her preoperative symptoms, with 100% improvement compared to her preoperative symptoms.      Patient Active Problem List   Diagnosis   • Primary osteoarthritis of right hip   • Status post total replacement of right hip   • Hypothyroid   • Tobacco abuse     Past Medical History:   Diagnosis Date   • Arthritis    • Cataract    • Disease of thyroid gland    • Heart disease    • Wears contact lenses    • Wears glasses       Past Surgical History:   Procedure Laterality Date   • ANKLE OPEN REDUCTION INTERNAL FIXATION     • TOTAL HIP ARTHROPLASTY Right 12/5/2017    Procedure: TOTAL HIP ARTHROPLASTY RIGHT;  Surgeon: Marek Talamantes MD;  Location: [facility];  Service:    • TUMOR REMOVAL        Family History   Problem Relation Age of Onset   • Osteoarthritis Mother    • Breast cancer Neg Hx    • Ovarian cancer Neg Hx      Social History     Social History   • Marital status: Single     Spouse name: N/A   • Number of children: N/A   • Years of education: N/A     Occupational History   • Not on file.     Social History Main Topics   • Smoking status: Current Every Day Smoker     Packs/day: 0.50     Years: 15.00     Types: Cigarettes   • Smokeless tobacco: Never Used   • Alcohol use Yes      Comment: occasional   • Drug use: No   • Sexual activity: Defer     Other Topics Concern   • Not on file     Social History Narrative      Current Outpatient Prescriptions on File Prior to Visit   Medication Sig Dispense Refill   • aspirin  MG tablet Take 1 tablet by mouth Daily. 30 tablet 0   • calcium carbonate  (OS-YASMINE) 600 MG tablet Take 600 mg by mouth Daily.     • docusate sodium 100 MG capsule Take 1 capsule by mouth 2 (Two) Times a Day As Needed for Constipation. 60 capsule 0   • ibuprofen (ADVIL,MOTRIN) 200 MG tablet Take 3 tablets by mouth Every 6 (Six) Hours As Needed for Mild Pain  or Moderate Pain . Must take an hour after aspirin if needed.     • levothyroxine (SYNTHROID, LEVOTHROID) 112 MCG tablet Take 112 mcg by mouth Daily.       No current facility-administered medications on file prior to visit.       No Known Allergies     Review of Systems     Objective      Physical Exam  There were no vitals taken for this visit.    There is no height or weight on file to calculate BMI.    General:   Mental Status:  Alert   Appearance: Cooperative, in no acute distress   Build and Nutrition: Well-nourished and well developed female   Orientation: Alert and oriented to person, place and time   Posture: Normal   Gait: With a cane    Integument:   Right hip: Wound is well-healed with no signs of infection    Lower Extremity:   Right Hip:    Tenderness:  None    Swelling:  None    Crepitus:  None    Range of motion:  External Rotation: 30°       Internal Rotation: 30°       Flexion:  100°       Extension:  0°    Deformities:  None  [facility]al [facility]    No leg length discrepancy        Assessment and Plan     Maria Eugenia was seen today for post-op.    Diagnoses and all orders for this visit:    Status post total replacement of right hip    Orthopedic aftercare        I reviewed my findings with patient today.  Her right total hip arthroplasty is functioning well, and she will continue to work on her abductor strengthening.  I will see her back in 4 months with x-rays, but sooner for any problems.    Return in about 4 months (around 5/31/2018) for Recheck with X-Rays.        Marek Talamantes MD  01/31/18  3:01 PM

## 2018-02-20 ENCOUNTER — TELEPHONE (OUTPATIENT)
Dept: ORTHOPEDIC SURGERY | Facility: CLINIC | Age: 63
End: 2018-02-20

## 2018-02-28 ENCOUNTER — TELEPHONE (OUTPATIENT)
Dept: ORTHOPEDIC SURGERY | Facility: CLINIC | Age: 63
End: 2018-02-28

## 2018-02-28 NOTE — TELEPHONE ENCOUNTER
PT CALLED ASKING IF IT WOULD BE OKAY FOR HER TO FLY/TRAVEL IN MID April? SHE THOUGHT SHE REMEMBERED SOMETHING ABOUT NOT TRAVELLING FOR A WHILE AFTER SURGERY.

## 2018-06-04 ENCOUNTER — OFFICE VISIT (OUTPATIENT)
Dept: ORTHOPEDIC SURGERY | Facility: CLINIC | Age: 63
End: 2018-06-04

## 2018-06-04 VITALS — BODY MASS INDEX: 22.91 KG/M2 | WEIGHT: 160.05 LBS | HEART RATE: 75 BPM | OXYGEN SATURATION: 98 % | HEIGHT: 70 IN

## 2018-06-04 DIAGNOSIS — Z09 POSTOPERATIVE EXAMINATION: ICD-10-CM

## 2018-06-04 DIAGNOSIS — Z96.641 STATUS POST RIGHT HIP REPLACEMENT: Primary | ICD-10-CM

## 2018-06-04 PROCEDURE — 99213 OFFICE O/P EST LOW 20 MIN: CPT | Performed by: ORTHOPAEDIC SURGERY

## 2018-06-04 NOTE — PROGRESS NOTES
[facility] Note    Subjective     Chief Complaint   Patient presents with   • Right Hip - Follow-up     4 month follow up -- total hip arthoplasty right 12/04/17        ANITA Sandoval is a 63 y.o. female. She follows up today for her right total hip arthroplasty.  She's doing well.  100% improvement compared to her preoperative symptoms.  Fully ambulatory without external aids.  She is now 6 months out from surgery.  She is pleased with results.      Patient Active Problem List   Diagnosis   • Primary osteoarthritis of right hip   • Status post total replacement of right hip   • Hypothyroid   • Tobacco abuse     Past Medical History:   Diagnosis Date   • Arthritis    • Cataract    • Disease of thyroid gland    • Heart disease    • Wears contact lenses    • Wears glasses       Past Surgical History:   Procedure Laterality Date   • ANKLE OPEN REDUCTION INTERNAL FIXATION     • TOTAL HIP ARTHROPLASTY Right 12/5/2017    Procedure: TOTAL HIP ARTHROPLASTY RIGHT;  Surgeon: Marek Talamantes MD;  Location: [facility];  Service:    • TUMOR REMOVAL        Family History   Problem Relation Age of Onset   • Osteoarthritis Mother    • Breast cancer Neg Hx    • Ovarian cancer Neg Hx      Social History     Social History   • Marital status: Single     Spouse name: N/A   • Number of children: N/A   • Years of education: N/A     Occupational History   • Not on file.     Social History Main Topics   • Smoking status: Current Every Day Smoker     Packs/day: 0.50     Years: 15.00     Types: Cigarettes   • Smokeless tobacco: Never Used   • Alcohol use Yes      Comment: occasional   • Drug use: No   • Sexual activity: Defer     Other Topics Concern   • Not on file     Social History Narrative   • No narrative on file      Current Outpatient Prescriptions on File Prior to Visit   Medication Sig Dispense Refill   • aspirin  MG tablet Take 1 tablet by mouth Daily. 30 tablet 0   • calcium carbonate (OS-YASMINE)  600 MG tablet Take 600 mg by mouth Daily.     • docusate sodium 100 MG capsule Take 1 capsule by mouth 2 (Two) Times a Day As Needed for Constipation. 60 capsule 0   • ibuprofen (ADVIL,MOTRIN) 200 MG tablet Take 3 tablets by mouth Every 6 (Six) Hours As Needed for Mild Pain  or Moderate Pain . Must take an hour after aspirin if needed.     • levothyroxine (SYNTHROID, LEVOTHROID) 112 MCG tablet Take 112 mcg by mouth Daily.       No current facility-administered medications on file prior to visit.       No Known Allergies     Review of Systems   Constitutional: Negative for activity change, appetite change, chills, diaphoresis, fatigue, fever and unexpected weight change.   HENT: Negative for congestion, dental problem, drooling, ear discharge, ear pain, facial swelling, hearing loss, mouth sores, nosebleeds, postnasal drip, rhinorrhea, sinus pressure, sneezing, sore throat, tinnitus, trouble swallowing and voice change.    Eyes: Negative for photophobia, pain, discharge, redness, itching and visual disturbance.   Respiratory: Negative for apnea, cough, choking, chest tightness, shortness of breath, wheezing and stridor.    Cardiovascular: Negative for chest pain, palpitations and leg swelling.   Gastrointestinal: Negative for abdominal distention, abdominal pain, anal bleeding, blood in stool, constipation, diarrhea, nausea, rectal pain and vomiting.   Endocrine: Negative for cold intolerance, heat intolerance, polydipsia, polyphagia and polyuria.   Genitourinary: Negative for decreased urine volume, difficulty urinating, dysuria, enuresis, flank pain, frequency, genital sores, hematuria and urgency.   Musculoskeletal: Positive for arthralgias. Negative for back pain, gait problem, joint swelling, myalgias, neck pain and neck stiffness.   Skin: Negative for color change, pallor, rash and wound.   Allergic/Immunologic: Negative for environmental allergies, food allergies and immunocompromised state.   Neurological:  "Negative for dizziness, tremors, seizures, syncope, facial asymmetry, speech difficulty, weakness, light-headedness, numbness and headaches.   Hematological: Negative for adenopathy. Does not bruise/bleed easily.   Psychiatric/Behavioral: Negative for agitation, behavioral problems, confusion, decreased concentration, dysphoric mood, hallucinations, self-injury, sleep disturbance and suicidal ideas. The patient is not nervous/anxious and is not hyperactive.         Objective      Physical Exam  Pulse 75   Ht 177.8 cm (70\")   Wt 72.6 kg (160 lb 0.9 oz)   SpO2 98%   BMI 22.97 kg/m²     Body mass index is 22.97 kg/m².    General:   Mental Status:  Alert   Appearance: Cooperative, in no acute distress   Build and Nutrition: Well-nourished and well developed female   Orientation: Alert and oriented to person, place and time   Posture: Normal   Gait: Normal    Integument:   Right hip: Wound is well-healed with no signs of infection    Lower Extremity:   Right Hip:    Tenderness:  None    Swelling:  None    Crepitus:  None    Range of motion:  External Rotation: 40°       Internal Rotation: 40°       Flexion:  100°       Extension:  0°    Deformities:  None  Functional testing: Negative [facility]    No leg length discrepancy      Imaging/Studies  Imaging Results (last 24 hours)     Procedure Component Value Units Date/Time    XR Hip With or Without Pelvis 2 - 3 View Right [[id number]] Resulted:  06/04/18 1539     Updated:  06/04/18 1539    Narrative:       Right Hip Radiographs  Indication: status-post right total hip arthroplasty  Views: low AP pelvis and lateral of the right hip    Comparison: no change compared to prior study, 12/20/2017     Findings:   The components are well aligned, with no signs of loosening or failure.              Assessment and Plan     Maria Eugenia was seen today for follow-up.    Diagnoses and all orders for this visit:    Status post right hip replacement  -     XR Hip With or Without Pelvis 2 " - 3 View Right    Postoperative examination        I reviewed my findings with patient today.  Her right total hip arthroplasty is functioning well, and she is pleased with results.  I'll see her back in 6 months, which will be a one-year checkup.  I will see her back sooner for any problems.  X-rays of her hip on the next visit.    Return in about 6 months (around 12/4/2018) for Recheck with X-Rays.      Medical Decision Making  Data/Risk: radiology tests and independent visualization of imaging, lab tests, or EMG/NCV      Marek Talamantes MD  06/04/18  3:55 PM

## 2018-09-05 ENCOUNTER — APPOINTMENT (OUTPATIENT)
Dept: PREADMISSION TESTING | Facility: HOSPITAL | Age: 63
End: 2018-09-05

## 2018-09-05 VITALS — BODY MASS INDEX: 24.72 KG/M2 | HEIGHT: 69 IN | WEIGHT: 166.89 LBS

## 2018-09-05 LAB
ALBUMIN SERPL-MCNC: 4.55 G/DL (ref 3.2–4.8)
ALBUMIN/GLOB SERPL: 1.5 G/DL (ref 1.5–2.5)
ALP SERPL-CCNC: 86 U/L (ref 25–100)
ALT SERPL W P-5'-P-CCNC: 25 U/L (ref 7–40)
ANION GAP SERPL CALCULATED.3IONS-SCNC: 8 MMOL/L (ref 3–11)
AST SERPL-CCNC: 27 U/L (ref 0–33)
BILIRUB SERPL-MCNC: 0.6 MG/DL (ref 0.3–1.2)
BUN BLD-MCNC: 10 MG/DL (ref 9–23)
BUN/CREAT SERPL: 18.2 (ref 7–25)
CALCIUM SPEC-SCNC: 9.2 MG/DL (ref 8.7–10.4)
CHLORIDE SERPL-SCNC: 107 MMOL/L (ref 99–109)
CO2 SERPL-SCNC: 26 MMOL/L (ref 20–31)
CREAT BLD-MCNC: 0.55 MG/DL (ref 0.6–1.3)
DEPRECATED RDW RBC AUTO: 51.4 FL (ref 37–54)
ERYTHROCYTE [DISTWIDTH] IN BLOOD BY AUTOMATED COUNT: 14 % (ref 11.3–14.5)
GFR SERPL CREATININE-BSD FRML MDRD: 112 ML/MIN/1.73
GLOBULIN UR ELPH-MCNC: 3 GM/DL
GLUCOSE BLD-MCNC: 89 MG/DL (ref 70–100)
HBA1C MFR BLD: 5.1 % (ref 4.8–5.6)
HCT VFR BLD AUTO: 42.3 % (ref 34.5–44)
HGB BLD-MCNC: 14.1 G/DL (ref 11.5–15.5)
MCH RBC QN AUTO: 33.3 PG (ref 27–31)
MCHC RBC AUTO-ENTMCNC: 33.3 G/DL (ref 32–36)
MCV RBC AUTO: 100 FL (ref 80–99)
PLATELET # BLD AUTO: 239 10*3/MM3 (ref 150–450)
PMV BLD AUTO: 11 FL (ref 6–12)
POTASSIUM BLD-SCNC: 4.2 MMOL/L (ref 3.5–5.5)
PROT SERPL-MCNC: 7.5 G/DL (ref 5.7–8.2)
RBC # BLD AUTO: 4.23 10*6/MM3 (ref 3.89–5.14)
SODIUM BLD-SCNC: 141 MMOL/L (ref 132–146)
WBC NRBC COR # BLD: 5.81 10*3/MM3 (ref 3.5–10.8)

## 2018-09-05 PROCEDURE — 36415 COLL VENOUS BLD VENIPUNCTURE: CPT

## 2018-09-05 PROCEDURE — 83036 HEMOGLOBIN GLYCOSYLATED A1C: CPT | Performed by: COLON & RECTAL SURGERY

## 2018-09-05 PROCEDURE — 80053 COMPREHEN METABOLIC PANEL: CPT | Performed by: COLON & RECTAL SURGERY

## 2018-09-05 PROCEDURE — 85027 COMPLETE CBC AUTOMATED: CPT | Performed by: COLON & RECTAL SURGERY

## 2018-09-05 RX ORDER — PHENOBARBITAL, HYOSCYAMINE SULFATE, ATROPINE SULFATE AND SCOPOLAMINE HYDROBROMIDE .0194; .1037; 16.2; .0065 MG/1; MG/1; MG/1; MG/1
1 TABLET ORAL EVERY 6 HOURS PRN
Status: ON HOLD | COMMUNITY
End: 2019-02-11

## 2018-09-05 RX ORDER — AMOXICILLIN AND CLAVULANATE POTASSIUM 875; 125 MG/1; MG/1
1 TABLET, FILM COATED ORAL AS NEEDED
COMMUNITY
End: 2018-09-13 | Stop reason: HOSPADM

## 2018-09-05 RX ORDER — ASPIRIN 81 MG/1
81 TABLET ORAL DAILY
COMMUNITY
End: 2022-12-21

## 2018-09-05 NOTE — PAT
EKG cleared by Dr. Camara.    Patient to apply Chlorhexadine wipes  to surgical area (as instructed) the night before procedure and the AM of procedure. Wipes provided.    Patient instructed to drink 20 ounces (or until full) of Gatorade or 20 ounces of G2 (if diabetic) and complete 3 hours before your surgery start time. (NO RED Gatorade or G2)    Patient verbalized understanding.    GI nurse navigator notified of upcoming admission.     CXR in chart from Saturday 9/1/18

## 2018-09-05 NOTE — DISCHARGE INSTRUCTIONS

## 2018-09-10 ENCOUNTER — ANESTHESIA (OUTPATIENT)
Dept: PERIOP | Facility: HOSPITAL | Age: 63
End: 2018-09-10

## 2018-09-10 ENCOUNTER — APPOINTMENT (OUTPATIENT)
Dept: GENERAL RADIOLOGY | Facility: HOSPITAL | Age: 63
End: 2018-09-10
Attending: COLON & RECTAL SURGERY

## 2018-09-10 ENCOUNTER — ANESTHESIA EVENT (OUTPATIENT)
Dept: PERIOP | Facility: HOSPITAL | Age: 63
End: 2018-09-10

## 2018-09-10 ENCOUNTER — HOSPITAL ENCOUNTER (INPATIENT)
Facility: HOSPITAL | Age: 63
LOS: 3 days | Discharge: HOME HEALTH | End: 2018-09-13
Attending: COLON & RECTAL SURGERY | Admitting: COLON & RECTAL SURGERY

## 2018-09-10 DIAGNOSIS — K57.20 DIVERTICULITIS OF LARGE INTESTINE WITH ABSCESS, UNSPECIFIED BLEEDING STATUS: ICD-10-CM

## 2018-09-10 DIAGNOSIS — K57.92 DIVERTICULITIS: ICD-10-CM

## 2018-09-10 PROCEDURE — 87206 SMEAR FLUORESCENT/ACID STAI: CPT | Performed by: COLON & RECTAL SURGERY

## 2018-09-10 PROCEDURE — 25010000002 DEXAMETHASONE PER 1 MG: Performed by: NURSE ANESTHETIST, CERTIFIED REGISTERED

## 2018-09-10 PROCEDURE — 25010000002 PROPOFOL 10 MG/ML EMULSION: Performed by: NURSE ANESTHETIST, CERTIFIED REGISTERED

## 2018-09-10 PROCEDURE — 88307 TISSUE EXAM BY PATHOLOGIST: CPT | Performed by: COLON & RECTAL SURGERY

## 2018-09-10 PROCEDURE — 25010000002 FENTANYL CITRATE (PF) 100 MCG/2ML SOLUTION: Performed by: NURSE ANESTHETIST, CERTIFIED REGISTERED

## 2018-09-10 PROCEDURE — 0DJD8ZZ INSPECTION OF LOWER INTESTINAL TRACT, VIA NATURAL OR ARTIFICIAL OPENING ENDOSCOPIC: ICD-10-PCS | Performed by: COLON & RECTAL SURGERY

## 2018-09-10 PROCEDURE — 87102 FUNGUS ISOLATION CULTURE: CPT | Performed by: COLON & RECTAL SURGERY

## 2018-09-10 PROCEDURE — 25010000002 HYDROMORPHONE PER 4 MG: Performed by: NURSE ANESTHETIST, CERTIFIED REGISTERED

## 2018-09-10 PROCEDURE — 25010000002 DEXAMETHASONE SODIUM PHOSPHATE 10 MG/ML SOLUTION 1 ML VIAL: Performed by: NURSE ANESTHETIST, CERTIFIED REGISTERED

## 2018-09-10 PROCEDURE — 87075 CULTR BACTERIA EXCEPT BLOOD: CPT | Performed by: COLON & RECTAL SURGERY

## 2018-09-10 PROCEDURE — 25010000002 NEOSTIGMINE 10 MG/10ML SOLUTION: Performed by: NURSE ANESTHETIST, CERTIFIED REGISTERED

## 2018-09-10 PROCEDURE — 25010000002 MORPHINE SULFATE (PF) 2 MG/ML SOLUTION: Performed by: COLON & RECTAL SURGERY

## 2018-09-10 PROCEDURE — 25010000002 BUPRENORPHINE PER 0.1 MG: Performed by: NURSE ANESTHETIST, CERTIFIED REGISTERED

## 2018-09-10 PROCEDURE — 87205 SMEAR GRAM STAIN: CPT | Performed by: COLON & RECTAL SURGERY

## 2018-09-10 PROCEDURE — 0UT70ZZ RESECTION OF BILATERAL FALLOPIAN TUBES, OPEN APPROACH: ICD-10-PCS | Performed by: COLON & RECTAL SURGERY

## 2018-09-10 PROCEDURE — 0J9C0ZX DRAINAGE OF PELVIC REGION SUBCUTANEOUS TISSUE AND FASCIA, OPEN APPROACH, DIAGNOSTIC: ICD-10-PCS | Performed by: COLON & RECTAL SURGERY

## 2018-09-10 PROCEDURE — 88304 TISSUE EXAM BY PATHOLOGIST: CPT | Performed by: COLON & RECTAL SURGERY

## 2018-09-10 PROCEDURE — 25010000002 ERTAPENEM 1 GM/100ML SOLUTION: Performed by: COLON & RECTAL SURGERY

## 2018-09-10 PROCEDURE — 0D1B0Z4 BYPASS ILEUM TO CUTANEOUS, OPEN APPROACH: ICD-10-PCS | Performed by: COLON & RECTAL SURGERY

## 2018-09-10 PROCEDURE — 0UT20ZZ RESECTION OF BILATERAL OVARIES, OPEN APPROACH: ICD-10-PCS | Performed by: COLON & RECTAL SURGERY

## 2018-09-10 PROCEDURE — 0DTN0ZZ RESECTION OF SIGMOID COLON, OPEN APPROACH: ICD-10-PCS | Performed by: COLON & RECTAL SURGERY

## 2018-09-10 PROCEDURE — 25010000002 HEPARIN (PORCINE) PER 1000 UNITS: Performed by: COLON & RECTAL SURGERY

## 2018-09-10 PROCEDURE — 0TN60ZZ RELEASE RIGHT URETER, OPEN APPROACH: ICD-10-PCS | Performed by: COLON & RECTAL SURGERY

## 2018-09-10 PROCEDURE — 0DTJ0ZZ RESECTION OF APPENDIX, OPEN APPROACH: ICD-10-PCS | Performed by: COLON & RECTAL SURGERY

## 2018-09-10 PROCEDURE — 87176 TISSUE HOMOGENIZATION CULTR: CPT | Performed by: COLON & RECTAL SURGERY

## 2018-09-10 PROCEDURE — 71046 X-RAY EXAM CHEST 2 VIEWS: CPT

## 2018-09-10 PROCEDURE — 25010000002 ONDANSETRON PER 1 MG: Performed by: NURSE ANESTHETIST, CERTIFIED REGISTERED

## 2018-09-10 PROCEDURE — 0WQF0ZZ REPAIR ABDOMINAL WALL, OPEN APPROACH: ICD-10-PCS | Performed by: COLON & RECTAL SURGERY

## 2018-09-10 PROCEDURE — 87116 MYCOBACTERIA CULTURE: CPT | Performed by: COLON & RECTAL SURGERY

## 2018-09-10 PROCEDURE — 25010000002 ONDANSETRON PER 1 MG: Performed by: COLON & RECTAL SURGERY

## 2018-09-10 PROCEDURE — 88305 TISSUE EXAM BY PATHOLOGIST: CPT | Performed by: COLON & RECTAL SURGERY

## 2018-09-10 PROCEDURE — 87070 CULTURE OTHR SPECIMN AEROBIC: CPT | Performed by: COLON & RECTAL SURGERY

## 2018-09-10 PROCEDURE — 25010000002 PROMETHAZINE PER 50 MG: Performed by: NURSE ANESTHETIST, CERTIFIED REGISTERED

## 2018-09-10 RX ORDER — LIDOCAINE HYDROCHLORIDE 10 MG/ML
INJECTION, SOLUTION EPIDURAL; INFILTRATION; INTRACAUDAL; PERINEURAL AS NEEDED
Status: DISCONTINUED | OUTPATIENT
Start: 2018-09-10 | End: 2018-09-10 | Stop reason: SURG

## 2018-09-10 RX ORDER — MELOXICAM 7.5 MG/1
15 TABLET ORAL ONCE
Status: COMPLETED | OUTPATIENT
Start: 2018-09-10 | End: 2018-09-10

## 2018-09-10 RX ORDER — BUPRENORPHINE HYDROCHLORIDE 0.32 MG/ML
INJECTION INTRAMUSCULAR; INTRAVENOUS AS NEEDED
Status: DISCONTINUED | OUTPATIENT
Start: 2018-09-10 | End: 2018-09-10 | Stop reason: SURG

## 2018-09-10 RX ORDER — SODIUM CHLORIDE 0.9 % (FLUSH) 0.9 %
1-10 SYRINGE (ML) INJECTION AS NEEDED
Status: DISCONTINUED | OUTPATIENT
Start: 2018-09-10 | End: 2018-09-13 | Stop reason: HOSPADM

## 2018-09-10 RX ORDER — PROMETHAZINE HYDROCHLORIDE 25 MG/1
25 TABLET ORAL ONCE AS NEEDED
Status: COMPLETED | OUTPATIENT
Start: 2018-09-10 | End: 2018-09-10

## 2018-09-10 RX ORDER — HYDROMORPHONE HYDROCHLORIDE 1 MG/ML
0.5 INJECTION, SOLUTION INTRAMUSCULAR; INTRAVENOUS; SUBCUTANEOUS
Status: DISCONTINUED | OUTPATIENT
Start: 2018-09-10 | End: 2018-09-10 | Stop reason: HOSPADM

## 2018-09-10 RX ORDER — LIDOCAINE HYDROCHLORIDE 10 MG/ML
0.5 INJECTION, SOLUTION EPIDURAL; INFILTRATION; INTRACAUDAL; PERINEURAL ONCE AS NEEDED
Status: DISCONTINUED | OUTPATIENT
Start: 2018-09-10 | End: 2018-09-13 | Stop reason: HOSPADM

## 2018-09-10 RX ORDER — ONDANSETRON 2 MG/ML
4 INJECTION INTRAMUSCULAR; INTRAVENOUS ONCE AS NEEDED
Status: DISCONTINUED | OUTPATIENT
Start: 2018-09-10 | End: 2018-09-10 | Stop reason: HOSPADM

## 2018-09-10 RX ORDER — MAGNESIUM HYDROXIDE 1200 MG/15ML
LIQUID ORAL AS NEEDED
Status: DISCONTINUED | OUTPATIENT
Start: 2018-09-10 | End: 2018-09-10 | Stop reason: HOSPADM

## 2018-09-10 RX ORDER — GLYCOPYRROLATE 0.2 MG/ML
INJECTION INTRAMUSCULAR; INTRAVENOUS AS NEEDED
Status: DISCONTINUED | OUTPATIENT
Start: 2018-09-10 | End: 2018-09-10 | Stop reason: SURG

## 2018-09-10 RX ORDER — SODIUM CHLORIDE, SODIUM LACTATE, POTASSIUM CHLORIDE, CALCIUM CHLORIDE 600; 310; 30; 20 MG/100ML; MG/100ML; MG/100ML; MG/100ML
INJECTION, SOLUTION INTRAVENOUS CONTINUOUS PRN
Status: DISCONTINUED | OUTPATIENT
Start: 2018-09-10 | End: 2018-09-10 | Stop reason: SURG

## 2018-09-10 RX ORDER — PROPOFOL 10 MG/ML
VIAL (ML) INTRAVENOUS AS NEEDED
Status: DISCONTINUED | OUTPATIENT
Start: 2018-09-10 | End: 2018-09-10 | Stop reason: SURG

## 2018-09-10 RX ORDER — FAMOTIDINE 10 MG/ML
20 INJECTION, SOLUTION INTRAVENOUS ONCE
Status: DISCONTINUED | OUTPATIENT
Start: 2018-09-10 | End: 2018-09-10

## 2018-09-10 RX ORDER — PROPOFOL 10 MG/ML
VIAL (ML) INTRAVENOUS CONTINUOUS PRN
Status: DISCONTINUED | OUTPATIENT
Start: 2018-09-10 | End: 2018-09-10 | Stop reason: SURG

## 2018-09-10 RX ORDER — PROMETHAZINE HYDROCHLORIDE 25 MG/ML
6.25 INJECTION, SOLUTION INTRAMUSCULAR; INTRAVENOUS ONCE AS NEEDED
Status: COMPLETED | OUTPATIENT
Start: 2018-09-10 | End: 2018-09-10

## 2018-09-10 RX ORDER — NALOXONE HCL 0.4 MG/ML
0.4 VIAL (ML) INJECTION
Status: DISCONTINUED | OUTPATIENT
Start: 2018-09-10 | End: 2018-09-13 | Stop reason: HOSPADM

## 2018-09-10 RX ORDER — BUPIVACAINE HYDROCHLORIDE 2.5 MG/ML
INJECTION, SOLUTION EPIDURAL; INFILTRATION; INTRACAUDAL AS NEEDED
Status: DISCONTINUED | OUTPATIENT
Start: 2018-09-10 | End: 2018-09-10

## 2018-09-10 RX ORDER — ACETAMINOPHEN 500 MG
1000 TABLET ORAL EVERY 8 HOURS
Status: DISCONTINUED | OUTPATIENT
Start: 2018-09-10 | End: 2018-09-11

## 2018-09-10 RX ORDER — PREGABALIN 75 MG/1
75 CAPSULE ORAL ONCE
Status: COMPLETED | OUTPATIENT
Start: 2018-09-10 | End: 2018-09-10

## 2018-09-10 RX ORDER — FENTANYL CITRATE 50 UG/ML
50 INJECTION, SOLUTION INTRAMUSCULAR; INTRAVENOUS
Status: DISCONTINUED | OUTPATIENT
Start: 2018-09-10 | End: 2018-09-10 | Stop reason: HOSPADM

## 2018-09-10 RX ORDER — MORPHINE SULFATE 2 MG/ML
2 INJECTION, SOLUTION INTRAMUSCULAR; INTRAVENOUS
Status: DISCONTINUED | OUTPATIENT
Start: 2018-09-10 | End: 2018-09-13 | Stop reason: HOSPADM

## 2018-09-10 RX ORDER — ONDANSETRON 2 MG/ML
INJECTION INTRAMUSCULAR; INTRAVENOUS AS NEEDED
Status: DISCONTINUED | OUTPATIENT
Start: 2018-09-10 | End: 2018-09-10 | Stop reason: SURG

## 2018-09-10 RX ORDER — SODIUM CHLORIDE 9 MG/ML
100 INJECTION, SOLUTION INTRAVENOUS ONCE
Status: COMPLETED | OUTPATIENT
Start: 2018-09-10 | End: 2018-09-11

## 2018-09-10 RX ORDER — ROCURONIUM BROMIDE 10 MG/ML
INJECTION, SOLUTION INTRAVENOUS AS NEEDED
Status: DISCONTINUED | OUTPATIENT
Start: 2018-09-10 | End: 2018-09-10 | Stop reason: SURG

## 2018-09-10 RX ORDER — DEXAMETHASONE SODIUM PHOSPHATE 4 MG/ML
INJECTION, SOLUTION INTRA-ARTICULAR; INTRALESIONAL; INTRAMUSCULAR; INTRAVENOUS; SOFT TISSUE AS NEEDED
Status: DISCONTINUED | OUTPATIENT
Start: 2018-09-10 | End: 2018-09-10 | Stop reason: SURG

## 2018-09-10 RX ORDER — FAMOTIDINE 20 MG/1
20 TABLET, FILM COATED ORAL ONCE
Status: DISCONTINUED | OUTPATIENT
Start: 2018-09-10 | End: 2018-09-10

## 2018-09-10 RX ORDER — GLYCOPYRROLATE 0.2 MG/ML
0.4 INJECTION INTRAMUSCULAR; INTRAVENOUS ONCE
Status: COMPLETED | OUTPATIENT
Start: 2018-09-10 | End: 2018-09-10

## 2018-09-10 RX ORDER — ONDANSETRON 2 MG/ML
4 INJECTION INTRAMUSCULAR; INTRAVENOUS EVERY 6 HOURS PRN
Status: DISCONTINUED | OUTPATIENT
Start: 2018-09-10 | End: 2018-09-13 | Stop reason: HOSPADM

## 2018-09-10 RX ORDER — SCOLOPAMINE TRANSDERMAL SYSTEM 1 MG/1
1 PATCH, EXTENDED RELEASE TRANSDERMAL ONCE
Status: DISCONTINUED | OUTPATIENT
Start: 2018-09-10 | End: 2018-09-10

## 2018-09-10 RX ORDER — FENTANYL CITRATE 50 UG/ML
INJECTION, SOLUTION INTRAMUSCULAR; INTRAVENOUS AS NEEDED
Status: DISCONTINUED | OUTPATIENT
Start: 2018-09-10 | End: 2018-09-10 | Stop reason: SURG

## 2018-09-10 RX ORDER — HYDROCODONE BITARTRATE AND ACETAMINOPHEN 7.5; 325 MG/1; MG/1
1 TABLET ORAL EVERY 4 HOURS PRN
Status: DISCONTINUED | OUTPATIENT
Start: 2018-09-10 | End: 2018-09-13 | Stop reason: HOSPADM

## 2018-09-10 RX ORDER — DIAZEPAM 5 MG/1
5 TABLET ORAL EVERY 6 HOURS PRN
Status: DISCONTINUED | OUTPATIENT
Start: 2018-09-10 | End: 2018-09-13 | Stop reason: HOSPADM

## 2018-09-10 RX ORDER — SODIUM CHLORIDE, SODIUM LACTATE, POTASSIUM CHLORIDE, CALCIUM CHLORIDE 600; 310; 30; 20 MG/100ML; MG/100ML; MG/100ML; MG/100ML
9 INJECTION, SOLUTION INTRAVENOUS CONTINUOUS
Status: DISCONTINUED | OUTPATIENT
Start: 2018-09-10 | End: 2018-09-13 | Stop reason: HOSPADM

## 2018-09-10 RX ORDER — LEVOTHYROXINE SODIUM 112 UG/1
112 TABLET ORAL
Status: DISCONTINUED | OUTPATIENT
Start: 2018-09-11 | End: 2018-09-13 | Stop reason: HOSPADM

## 2018-09-10 RX ORDER — FAMOTIDINE 20 MG/1
20 TABLET, FILM COATED ORAL ONCE
Status: COMPLETED | OUTPATIENT
Start: 2018-09-10 | End: 2018-09-10

## 2018-09-10 RX ORDER — ACETAMINOPHEN 500 MG
1000 TABLET ORAL ONCE
Status: COMPLETED | OUTPATIENT
Start: 2018-09-10 | End: 2018-09-10

## 2018-09-10 RX ORDER — MEPERIDINE HYDROCHLORIDE 25 MG/ML
12.5 INJECTION INTRAMUSCULAR; INTRAVENOUS; SUBCUTANEOUS
Status: DISCONTINUED | OUTPATIENT
Start: 2018-09-10 | End: 2018-09-10 | Stop reason: HOSPADM

## 2018-09-10 RX ORDER — PROMETHAZINE HYDROCHLORIDE 25 MG/1
25 SUPPOSITORY RECTAL ONCE AS NEEDED
Status: COMPLETED | OUTPATIENT
Start: 2018-09-10 | End: 2018-09-10

## 2018-09-10 RX ORDER — ALVIMOPAN 12 MG/1
12 CAPSULE ORAL ONCE
Status: DISCONTINUED | OUTPATIENT
Start: 2018-09-10 | End: 2018-09-10 | Stop reason: HOSPADM

## 2018-09-10 RX ORDER — LIDOCAINE HYDROCHLORIDE 10 MG/ML
0.5 INJECTION, SOLUTION EPIDURAL; INFILTRATION; INTRACAUDAL; PERINEURAL ONCE
Status: COMPLETED | OUTPATIENT
Start: 2018-09-10 | End: 2018-09-10

## 2018-09-10 RX ORDER — NEOSTIGMINE METHYLSULFATE 1 MG/ML
INJECTION, SOLUTION INTRAVENOUS AS NEEDED
Status: DISCONTINUED | OUTPATIENT
Start: 2018-09-10 | End: 2018-09-10 | Stop reason: SURG

## 2018-09-10 RX ORDER — HEPARIN SODIUM 5000 [USP'U]/ML
5000 INJECTION, SOLUTION INTRAVENOUS; SUBCUTANEOUS ONCE
Status: COMPLETED | OUTPATIENT
Start: 2018-09-10 | End: 2018-09-10

## 2018-09-10 RX ADMIN — ERTAPENEM SODIUM 1 G: 1 INJECTION, POWDER, LYOPHILIZED, FOR SOLUTION INTRAMUSCULAR; INTRAVENOUS at 18:20

## 2018-09-10 RX ADMIN — HYDROMORPHONE HYDROCHLORIDE 0.5 MG: 1 INJECTION, SOLUTION INTRAMUSCULAR; INTRAVENOUS; SUBCUTANEOUS at 15:43

## 2018-09-10 RX ADMIN — FENTANYL CITRATE 50 MCG: 50 INJECTION, SOLUTION INTRAMUSCULAR; INTRAVENOUS at 12:39

## 2018-09-10 RX ADMIN — FENTANYL CITRATE 50 MCG: 50 INJECTION, SOLUTION INTRAMUSCULAR; INTRAVENOUS at 13:06

## 2018-09-10 RX ADMIN — LIDOCAINE HYDROCHLORIDE 50 MG: 10 INJECTION, SOLUTION EPIDURAL; INFILTRATION; INTRACAUDAL; PERINEURAL at 12:39

## 2018-09-10 RX ADMIN — NEOSTIGMINE METHYLSULFATE 3 MG: 1 INJECTION, SOLUTION INTRAVENOUS at 14:51

## 2018-09-10 RX ADMIN — PREGABALIN 75 MG: 75 CAPSULE ORAL at 11:57

## 2018-09-10 RX ADMIN — SODIUM CHLORIDE, POTASSIUM CHLORIDE, SODIUM LACTATE AND CALCIUM CHLORIDE: 600; 310; 30; 20 INJECTION, SOLUTION INTRAVENOUS at 13:45

## 2018-09-10 RX ADMIN — PROPOFOL 35 MCG/KG/MIN: 10 INJECTION, EMULSION INTRAVENOUS at 12:39

## 2018-09-10 RX ADMIN — PROPOFOL 200 MG: 10 INJECTION, EMULSION INTRAVENOUS at 12:39

## 2018-09-10 RX ADMIN — BUPRENORPHINE HYDROCHLORIDE 0.3 MCG: 0.32 INJECTION INTRAMUSCULAR; INTRAVENOUS at 12:42

## 2018-09-10 RX ADMIN — PROMETHAZINE HYDROCHLORIDE 6.25 MG: 25 INJECTION INTRAMUSCULAR; INTRAVENOUS at 16:01

## 2018-09-10 RX ADMIN — SCOPOLAMINE 1 PATCH: 1 PATCH, EXTENDED RELEASE TRANSDERMAL at 11:57

## 2018-09-10 RX ADMIN — SODIUM CHLORIDE, POTASSIUM CHLORIDE, SODIUM LACTATE AND CALCIUM CHLORIDE: 600; 310; 30; 20 INJECTION, SOLUTION INTRAVENOUS at 12:30

## 2018-09-10 RX ADMIN — HYDROCODONE BITARTRATE AND ACETAMINOPHEN 1 TABLET: 7.5; 325 TABLET ORAL at 23:57

## 2018-09-10 RX ADMIN — DEXAMETHASONE SODIUM PHOSPHATE 60 ML: 10 INJECTION, SOLUTION INTRAMUSCULAR; INTRAVENOUS at 12:42

## 2018-09-10 RX ADMIN — FENTANYL CITRATE 50 MCG: 50 INJECTION, SOLUTION INTRAMUSCULAR; INTRAVENOUS at 15:20

## 2018-09-10 RX ADMIN — GLYCOPYRROLATE 0.4 MG: 0.2 INJECTION, SOLUTION INTRAMUSCULAR; INTRAVENOUS at 15:25

## 2018-09-10 RX ADMIN — LIDOCAINE HYDROCHLORIDE 0.3 ML: 10 INJECTION, SOLUTION EPIDURAL; INFILTRATION; INTRACAUDAL; PERINEURAL at 12:01

## 2018-09-10 RX ADMIN — HEPARIN SODIUM 5000 UNITS: 5000 INJECTION, SOLUTION INTRAVENOUS; SUBCUTANEOUS at 11:56

## 2018-09-10 RX ADMIN — HYDROCODONE BITARTRATE AND ACETAMINOPHEN 1 TABLET: 7.5; 325 TABLET ORAL at 20:08

## 2018-09-10 RX ADMIN — ONDANSETRON 4 MG: 2 INJECTION INTRAMUSCULAR; INTRAVENOUS at 14:30

## 2018-09-10 RX ADMIN — SODIUM CHLORIDE 1000 ML: 9 INJECTION, SOLUTION INTRAVENOUS at 12:02

## 2018-09-10 RX ADMIN — ONDANSETRON 4 MG: 2 INJECTION INTRAMUSCULAR; INTRAVENOUS at 20:53

## 2018-09-10 RX ADMIN — HYDROMORPHONE HYDROCHLORIDE 0.5 MG: 1 INJECTION, SOLUTION INTRAMUSCULAR; INTRAVENOUS; SUBCUTANEOUS at 15:58

## 2018-09-10 RX ADMIN — ROCURONIUM BROMIDE 40 MG: 10 SOLUTION INTRAVENOUS at 12:39

## 2018-09-10 RX ADMIN — SODIUM CHLORIDE, POTASSIUM CHLORIDE, SODIUM LACTATE AND CALCIUM CHLORIDE: 600; 310; 30; 20 INJECTION, SOLUTION INTRAVENOUS at 11:54

## 2018-09-10 RX ADMIN — MELOXICAM 15 MG: 7.5 TABLET ORAL at 11:56

## 2018-09-10 RX ADMIN — DEXAMETHASONE SODIUM PHOSPHATE 8 MG: 4 INJECTION, SOLUTION INTRAMUSCULAR; INTRAVENOUS at 13:18

## 2018-09-10 RX ADMIN — FAMOTIDINE 20 MG: 20 TABLET, FILM COATED ORAL at 12:01

## 2018-09-10 RX ADMIN — DIAZEPAM 5 MG: 5 TABLET ORAL at 17:09

## 2018-09-10 RX ADMIN — SODIUM CHLORIDE 100 ML/HR: 9 INJECTION, SOLUTION INTRAVENOUS at 17:00

## 2018-09-10 RX ADMIN — ACETAMINOPHEN 1000 MG: 500 TABLET, FILM COATED ORAL at 22:13

## 2018-09-10 RX ADMIN — HYDROMORPHONE HYDROCHLORIDE 0.5 MG: 1 INJECTION, SOLUTION INTRAMUSCULAR; INTRAVENOUS; SUBCUTANEOUS at 15:50

## 2018-09-10 RX ADMIN — ACETAMINOPHEN 1000 MG: 500 TABLET, FILM COATED ORAL at 11:56

## 2018-09-10 RX ADMIN — FENTANYL CITRATE 50 MCG: 50 INJECTION, SOLUTION INTRAMUSCULAR; INTRAVENOUS at 15:30

## 2018-09-10 RX ADMIN — GLYCOPYRROLATE 0.4 MG: 0.2 INJECTION, SOLUTION INTRAMUSCULAR; INTRAVENOUS at 14:51

## 2018-09-10 RX ADMIN — MORPHINE SULFATE 2 MG: 2 INJECTION, SOLUTION INTRAMUSCULAR; INTRAVENOUS at 20:50

## 2018-09-10 NOTE — H&P
"Pre-Op H&P  Maria Eugenia Sandoval  [id number]  1955    Chief complaint: Chronic and recurring sigmoid diverticulitis    HPI:    Patient is a 63 y.o.female who presents with abdominal pain/cramping and found to have diverticulitis and here today for low anterior resection, appendectomy with possible bilateral salpingo oophorectomy    Review of Systems:  General ROS: negative for chills, fever or skin lesions;  No changes since last office visit  Cardiovascular ROS: no chest pain or dyspnea on exertion.  Recent [facility] ER visit with CP 9/1/18.  EKG/Trop negative with no further episodes.  Pt reports diagnosis of \"anxiety\"  Respiratory ROS: no cough, shortness of breath, or wheezing.  CXR [facility] 9/1/18 ? Pleural effusion.  PA/Lat CXR pending    Allergies: No Known Allergies    Home Meds:    No current facility-administered medications on file prior to encounter.      Current Outpatient Prescriptions on File Prior to Encounter   Medication Sig Dispense Refill   • ibuprofen (ADVIL,MOTRIN) 200 MG tablet Take 3 tablets by mouth Every 6 (Six) Hours As Needed for Mild Pain  or Moderate Pain . Must take an hour after aspirin if needed.     • levothyroxine (SYNTHROID, LEVOTHROID) 112 MCG tablet Take 112 mcg by mouth Daily.     • calcium carbonate (OS-YASMINE) 600 MG tablet Take 600 mg by mouth Daily.         PMH:   Past Medical History:   Diagnosis Date   • Anxiety    • Arthritis    • Cataract    • Disease of thyroid gland    • Diverticulitis    • GERD (gastroesophageal reflux disease)    • Heart disease    • Wears contact lenses    • Wears glasses      PSH:    Past Surgical History:   Procedure Laterality Date   • ANKLE OPEN REDUCTION INTERNAL FIXATION     • COLONOSCOPY     • ENDOSCOPY     • TOTAL HIP ARTHROPLASTY Right 12/5/2017    Procedure: TOTAL HIP ARTHROPLASTY RIGHT;  Surgeon: Marek Talamantes MD;  Location: [facility];  Service:    • TUMOR REMOVAL       Social History:   Tobacco:   History   Smoking Status   • Current Every Day Smoker " "  • Packs/day: 0.25   • Years: 15.00   • Types: Cigarettes   Smokeless Tobacco   • Never Used     Comment: down to 5 cigarettes a day       Alcohol:     History   Alcohol Use   • Yes     Comment: 2 glass a wine a night        Vitals:           /88 (BP Location: Right arm, Patient Position: Lying)   Pulse 78   Temp 97.9 °F (36.6 °C) (Temporal Artery )   Resp 18   Ht 175.3 cm (69\")   Wt 75.3 kg (166 lb)   SpO2 98%   BMI 24.51 kg/m²     Physical Exam:  General Appearance:    Alert, cooperative, no distress, appears stated age   Head:    Normocephalic, without obvious abnormality, atraumatic   Lungs:     Clear to auscultation bilaterally, respirations unlabored    Heart:   Regular rate and rhythm, S1 and S2 normal, no murmur, rub    or gallop    Abdomen:    Soft, non-tender.  +bowel sounds   Breast Exam:    deferred   Genitalia:    deferred   Extremities:   Extremities normal, atraumatic, no cyanosis or edema   Skin:   Skin color, texture, turgor normal, no rashes or lesions   Neurologic:   Grossly intact   Results Review  I reviewed the patient's new clinical results.    Cancer Staging (if applicable)  Cancer Patient: __ yes _x_no __unknown; If yes, clinical stage T:__ N:__M:__, stage group or __N/A    Impression: Diverticulitis    Plan: Low anterior resection, appendectomy with possible bilateral salpingo oophorectomy    Devorah Bhakta, APRN   9/10/2018   12:26 PM  "

## 2018-09-10 NOTE — ANESTHESIA PROCEDURE NOTES
Peripheral Block    Patient location during procedure: OR  Reason for block: at surgeon's request and post-op pain management  Performed by  Anesthesiologist: CANDI OLMEDO  Preanesthetic Checklist  Completed: patient identified, site marked, surgical consent, pre-op evaluation, timeout performed, IV checked, risks and benefits discussed and monitors and equipment checked  Prep:  Pt Position: supine  Sterile barriers:cap, gloves, sterile barriers and mask  Prep: ChloraPrep  Patient monitoring: blood pressure monitoring, continuous pulse oximetry and EKG  Procedure  Sedation:yes  Performed under: general  Guidance:ultrasound guided  Images:still images obtained    Laterality:Bilateral  Block Type:TAP  Injection Technique:single-shot  Needle Type:short-bevel and echogenic  Needle Gauge:20 G    Medications  Comment:Block Injection:  LA dose divided between Right and Left block       Adjuncts:  Decadron 4mg PSF, Buprenex 0.3mg (Per total volume of LA)  Local Injected:bupivacaine 0.25% Local Amount Injected:60mL  Post Assessment  Injection Assessment: negative aspiration for heme, incremental injection and no paresthesia on injection  Patient Tolerance:comfortable throughout block  Complications:no  Additional Notes      Under Ultrasound guidance, a BBraun 4inch 360 degree needle was advanced with Normal Saline hydro dissection of tissue.  The Internal Oblique and Transversus Abdominus muscles where visualized.  At or before the aponeurosis of Internal Oblique, local anesthetic spread was visualized in the Transversus Abdominus Plane. Injection was made incrementally with aspiration every 5 mls.  There was no  intravascular injection,  injection pressure was normal, there was no neural injection, and the procedure was completed without difficulty.  Thank You.

## 2018-09-10 NOTE — ANESTHESIA POSTPROCEDURE EVALUATION
Patient: Maria Eugenia Sandoval    Procedure Summary     Date:  09/10/18 Room / Location:   [facility]    Anesthesia Start:  1231 Anesthesia Stop:  1503    Procedure:  SIGMOID COLECTOMY, APPENDECTOMY, TAKEDOWN OF SPLENIC FEXURE,PROCTOSCOPY, UMBILICAL HERNIA REPAIR, DEBRIDEMENT & CULTURE OF PELVIC ABCESS,DIVERTING LOOP ILEOSTOMY, RIGHT URETEROLYSIS, BILATERAL SALPINGO OOPHORECTOMY (N/A Abdomen) Diagnosis:      Surgeon:  Shant Lerner MD Provider:  Lenny Baltazar MD    Anesthesia Type:  general, regional ASA Status:  3          Anesthesia Type: general, regional  Last vitals  BP   107/88 (09/10/18 1142)   Temp   97.9 °F (36.6 °C) (09/10/18 1142)   Pulse   78 (09/10/18 1142)   Resp   18 (09/10/18 1142)     SpO2   98 % (09/10/18 1142)     Post Anesthesia Care and Evaluation    Patient location during evaluation: PACU  Patient participation: complete - patient participated  Level of consciousness: awake and alert  Pain score: 0  Pain management: adequate  Airway patency: patent  Anesthetic complications: No anesthetic complications  PONV Status: none  Cardiovascular status: hemodynamically stable and acceptable  Respiratory status: nonlabored ventilation, acceptable and nasal cannula  Hydration status: acceptable

## 2018-09-10 NOTE — ANESTHESIA POSTPROCEDURE EVALUATION
Patient: Maria Eugenia Sandoval    Procedure Summary     Date:  09/10/18 Room / Location:   [facility]    Anesthesia Start:  1231 Anesthesia Stop:  1503    Procedure:  SIGMOID COLECTOMY, APPENDECTOMY, TAKEDOWN OF SPLENIC FEXURE,PROCTOSCOPY, UMBILICAL HERNIA REPAIR, DEBRIDEMENT & CULTURE OF PELVIC ABCESS,DIVERTING LOOP ILEOSTOMY, RIGHT URETEROLYSIS, BILATERAL SALPINGO OOPHORECTOMY (N/A Abdomen) Diagnosis:      Surgeon:  Shant Lerner MD Provider:  Lenny Baltazar MD    Anesthesia Type:  general, regional ASA Status:  3          Anesthesia Type: general, regional  Last vitals  BP   116/61 (09/10/18 1600)   Temp   98.7 °F (37.1 °C) (09/10/18 1600)   Pulse   53 (09/10/18 1600)   Resp   18 (09/10/18 1600)     SpO2   99 % (09/10/18 1600)     Anesthesia Post Evaluation

## 2018-09-10 NOTE — OP NOTE
Colon and Rectal [[facility]]    COLON RESECTION LOW ANTERIOR  Procedure Note    Maria Eugenia Sandoval  9/10/2018    Pre-op Diagnosis:   Chronic and recurrent sigmoid diverticulitis    Post-op Diagnosis:     Diverticular abscess involving the uterus, appendix and right ureter  Umbilical hernia    Procedure(s):  SIGMOID COLECTOMY, APPENDECTOMY, TAKEDOWN OF SPLENIC FEXURE,PROCTOSCOPY, UMBILICAL HERNIA REPAIR, DEBRIDEMENT & CULTURE OF PELVIC ABCESS,DIVERTING LOOP ILEOSTOMY, RIGHT URETEROLYSIS, BILATERAL SALPINGO OOPHORECTOMY    Surgeon(s):  Shant Lerner MD    Anesthesia: General with Block    Staff:   Circulator: Dee Fay RN; Jeanette Dowell RN; Anusha Rosen RN  Scrub Person: Brianda Keller; Riya Tipton  Assistant: Danni Glasgow PA-C    Estimated Blood Loss: 400 mL    Specimens:                  Order Name Source Comment Collection Info Order Time   ANAEROBIC CULTURE Pelvis  Collected By: Shant Lerner MD 9/10/2018  1:54 PM   FUNGUS CULTURE Pelvis  Collected By: Shant Lerner MD 9/10/2018  1:54 PM   TISSUE / BONE CULTURE Pelvis  Collected By: Shant Lerner MD 9/10/2018  1:54 PM   AFB CULTURE Pelvis  Collected By: Shant Lerner MD 9/10/2018  1:54 PM   TISSUE PATHOLOGY EXAM Large Intestine, Appendix  Collected By: Shant Lerner MD 9/10/2018  1:24 PM         Drains:   Ileostomy Loop RLQ (Active)       Urethral Catheter Silicone 16 Fr. (Active)       Findings: A solid pelvic mass below the promontory densely adherent to the entire back of the uterus and upper vagina with the appendix in the right ureter pulled into the wall of the abscess.  The sigmoid process was folded down deep into the pelvis distorting the whole rectum and causing an inflammatory peel and the entire posterior cul-de-sac and going up both sides of the rectum.  Granulation tissue is present at the apex of the vagina but no pus was found there.  Granulation tissue was removed and cultured.  A small  fat-containing umbilical hernia.  Liver gallbladder retroperitoneum and kidneys were normal.  Left ureter was not involved.    Technique: The patient was taken to the operating room and placed under general anesthesia in lithotomy position on a beanbag with Todd stirrups and a Stevens catheter.  A tap block was given.  After being prepped and draped timeout was performed.  A lower vertical incision was made and I could feel a umbilical hernia so I divided it and then remove the umbilicus off the fascia.  Small bowel was run and was normal.  It was impossible to start in the pelvis so I went up the left gutter mobilizing splenic flexure and from the left ureter and followed deep into the pelvis.  This didn't seem to help so went to the right side which was greatly distorted so I immediately went for the ureter.  The appendix was tethered over it going deep in the pelvis so mesial appendix and ligated that and then divided the base of the appendix and ligated that and then I was able to peel the appendix off the ureter and remove it.  Fallopian tube was in the way so I divided that and removed.  The ovary was much deeper so I dissected the ureter even further off the lower aspect of the ovary and then I was able to peel the ovary out of the pelvis and had to the figure-of-eight in the sidewall of the uterus to control the bleeding there.  Now a new over the ureter was is able to score the right side of the rectal mesentery and in the presacral space.  Is able to finger fracture the colon off the uterus but was much more difficult angle fracturing off the back of the vagina but ultimately that was done.  This allowed the sigmoid abscess to come up the abdomen.  I scored the very thick posterior cul-de-sac peel over the rectum in several places and then was able to finally pass the 25 mm sizer up the rectum.  Is able to isolate out the upper rectum and fine divided with a Green 55 linear stapler.  The sigmoid vessels  were divided and ligated and I was able to bring the sigmoid inflammatory process of the abdomen.  Hemostasis was achieved in the pelvis and then the posterior cul-de-sac granulation tissue is curetted out and cultured.  There may have been some small endometriomas deep in the pelvis but it was difficult to tell foscarnet.  After scarring the fascia over the rectum as able to get the 28 sizer up and then I had to cut some more bands mainly on the left side to straighten the low rectum out.  This left for very an even surface to the rectum evidence of allowed dissection.  This being done I was able to choose a point on the left colon for the anastomosis after the splenic flexure came down and I divided the bowel.    A 29 CEA anvil was sewn into the left colon and then I had to straighten out the rectum even further as I passed the stapler up to the apex.  The apex was still fairly irritated so I chose a point on the anterior rectum for the anastomosis, clean it up was able to pass the stapler through that area and snapped the left colon and the place.  Left colon was lined up anatomically and then proctoscopy showed an airtight anastomosis with good blood supply at about 14 cm.    Because of the disrupted and messy pelvis I opted to drain it with a 10 flat Hiren-Medina that went anterior left groin went over the rectum through the area where the abscess was on the back of the uterus then coiled under the rectum.  Having done this I felt a loop ileostomy was in order and Tegaderm of skin from the right lower quadrant and penetrated through the rectus muscle and fascia and dilated up to 2 fingers.  The distal loop of ileum was brought up through there and a red rubber Thomas catheter put through the mesentery to hold it in place.  The abdomen was irrigated with saline and made hemostatic with the sponge and needle count being correct the fascia was closed in a single layer with #1 PDS.  Subcutaneous tissue was  irrigated with saline and skin closed with a running #1 Prolene.  The loop ileostomy was matured in an appliance placed.  The patient tolerated the procedure well and was taken to the recovery room in satisfactory condition.    CC Carlos Sandoval M.D.    Complications:0      Shant Lerner MD     Date: 9/10/2018  Time: 3:25 PM

## 2018-09-10 NOTE — ANESTHESIA PREPROCEDURE EVALUATION
Anesthesia Evaluation     Patient summary reviewed and Nursing notes reviewed                Airway   Mallampati: II  Dental      Pulmonary - negative pulmonary ROS   Cardiovascular - negative cardio ROS        Neuro/Psych- negative ROS  GI/Hepatic/Renal/Endo    (+)  GERD,  hypothyroidism,     Musculoskeletal (-) negative ROS    Abdominal    Substance History - negative use     OB/GYN negative ob/gyn ROS         Other                        Anesthesia Plan    ASA 3     general and regional     intravenous induction   Anesthetic plan, all risks, benefits, and alternatives have been provided, discussed and informed consent has been obtained with: patient.

## 2018-09-10 NOTE — ANESTHESIA PROCEDURE NOTES
Airway  Urgency: elective    Airway not difficult    General Information and Staff    Patient location during procedure: OR  CRNA: LAM POLO    Indications and Patient Condition  Indications for airway management: airway protection    Preoxygenated: yes  MILS not maintained throughout  Mask difficulty assessment: 1 - vent by mask    Final Airway Details  Final airway type: endotracheal airway      Successful airway: ETT  Cuffed: yes   Successful intubation technique: direct laryngoscopy  Endotracheal tube insertion site: oral  Blade: Humza  Blade size: 3  ETT size: 7.0 mm  Cormack-Lehane Classification: grade I - full view of glottis  Placement verified by: chest auscultation and capnometry   Measured from: lips  ETT to lips (cm): 20  Number of attempts at approach: 1    Additional Comments  Negative epigastric sounds, Breath sound equal bilaterally with symmetric chest rise and fall

## 2018-09-11 PROCEDURE — 25010000002 ERTAPENEM 1 GM/100ML SOLUTION: Performed by: COLON & RECTAL SURGERY

## 2018-09-11 PROCEDURE — 99221 1ST HOSP IP/OBS SF/LOW 40: CPT | Performed by: NURSE PRACTITIONER

## 2018-09-11 PROCEDURE — 25010000002 ONDANSETRON PER 1 MG: Performed by: COLON & RECTAL SURGERY

## 2018-09-11 PROCEDURE — 25010000002 KETOROLAC TROMETHAMINE PER 15 MG: Performed by: COLON & RECTAL SURGERY

## 2018-09-11 PROCEDURE — 25010000002 ENOXAPARIN PER 10 MG: Performed by: COLON & RECTAL SURGERY

## 2018-09-11 RX ORDER — NICOTINE 21 MG/24HR
1 PATCH, TRANSDERMAL 24 HOURS TRANSDERMAL
Status: DISCONTINUED | OUTPATIENT
Start: 2018-09-11 | End: 2018-09-13 | Stop reason: HOSPADM

## 2018-09-11 RX ORDER — KETOROLAC TROMETHAMINE 30 MG/ML
30 INJECTION, SOLUTION INTRAMUSCULAR; INTRAVENOUS EVERY 8 HOURS PRN
Status: DISCONTINUED | OUTPATIENT
Start: 2018-09-11 | End: 2018-09-13 | Stop reason: HOSPADM

## 2018-09-11 RX ORDER — SENNA AND DOCUSATE SODIUM 50; 8.6 MG/1; MG/1
2 TABLET, FILM COATED ORAL 2 TIMES DAILY
Status: DISCONTINUED | OUTPATIENT
Start: 2018-09-11 | End: 2018-09-13 | Stop reason: HOSPADM

## 2018-09-11 RX ADMIN — KETOROLAC TROMETHAMINE 30 MG: 30 INJECTION, SOLUTION INTRAMUSCULAR; INTRAVENOUS at 17:39

## 2018-09-11 RX ADMIN — DIAZEPAM 5 MG: 5 TABLET ORAL at 04:00

## 2018-09-11 RX ADMIN — ENOXAPARIN SODIUM 40 MG: 40 INJECTION SUBCUTANEOUS at 07:59

## 2018-09-11 RX ADMIN — ONDANSETRON 4 MG: 2 INJECTION INTRAMUSCULAR; INTRAVENOUS at 03:58

## 2018-09-11 RX ADMIN — Medication 2 TABLET: at 20:27

## 2018-09-11 RX ADMIN — HYDROCODONE BITARTRATE AND ACETAMINOPHEN 1 TABLET: 7.5; 325 TABLET ORAL at 17:40

## 2018-09-11 RX ADMIN — ERTAPENEM SODIUM 1 G: 1 INJECTION, POWDER, LYOPHILIZED, FOR SOLUTION INTRAMUSCULAR; INTRAVENOUS at 17:46

## 2018-09-11 RX ADMIN — HYDROCODONE BITARTRATE AND ACETAMINOPHEN 1 TABLET: 7.5; 325 TABLET ORAL at 07:59

## 2018-09-11 RX ADMIN — ACETAMINOPHEN 1000 MG: 500 TABLET, FILM COATED ORAL at 06:17

## 2018-09-11 RX ADMIN — LEVOTHYROXINE SODIUM 112 MCG: 112 TABLET ORAL at 06:17

## 2018-09-11 RX ADMIN — DIAZEPAM 5 MG: 5 TABLET ORAL at 13:27

## 2018-09-11 RX ADMIN — HYDROCODONE BITARTRATE AND ACETAMINOPHEN 1 TABLET: 7.5; 325 TABLET ORAL at 03:57

## 2018-09-11 RX ADMIN — NICOTINE 1 PATCH: 21 PATCH, EXTENDED RELEASE TRANSDERMAL at 20:27

## 2018-09-11 NOTE — PLAN OF CARE
Problem: Patient Care Overview  Goal: Plan of Care Review  Outcome: Ongoing (interventions implemented as appropriate)   09/11/18 0357 09/11/18 0428   Plan of Care Review   Progress --  improving   OTHER   Outcome Summary --  Pt had several c/o pain and nausea, relieved some with medication. VSS. Pt otherwise rested well. No flatus or stool from ileostomy. Will continue to monitor.   Coping/Psychosocial   Plan of Care Reviewed With patient --      Goal: Individualization and Mutuality  Outcome: Ongoing (interventions implemented as appropriate)    Goal: Discharge Needs Assessment  Outcome: Ongoing (interventions implemented as appropriate)   09/10/18 1145 09/10/18 1700 09/10/18 1754   Discharge Needs Assessment   Readmission Within the Last 30 Days --  --  no previous admission in last 30 days   Concerns to be Addressed --  --  denies needs/concerns at this time   Patient/Family Anticipates Transition to --  home with family --    Patient/Family Anticipated Services at Transition --  none --    Transportation Concerns --  car, none --    Transportation Anticipated --  family or friend will provide --    Disability   Equipment Currently Used at Home shower chair;walker, rolling;cane, straight --  --      Goal: Interprofessional Rounds/Family Conf  Outcome: Ongoing (interventions implemented as appropriate)      Problem: Pain, Acute (Adult)  Goal: Identify Related Risk Factors and Signs and Symptoms  Outcome: Ongoing (interventions implemented as appropriate)   09/10/18 1754   Pain, Acute (Adult)   Related Risk Factors (Acute Pain) surgery   Signs and Symptoms (Acute Pain) verbalization of pain descriptors     Goal: Acceptable Pain Control/Comfort Level  Outcome: Ongoing (interventions implemented as appropriate)   09/11/18 0428   Pain, Acute (Adult)   Acceptable Pain Control/Comfort Level making progress toward outcome

## 2018-09-11 NOTE — NURSING NOTE
Patient seen today for ostomy education and care. Pt nervous about seeing stoma for the first time, but did well. Stoma is red, moist and budded with bridge present. Output noted in the appliance. Educational folder given to patient and reviewed with patient and son. Questions answered and POC discussed. Pt wearing an intact Martha one piece. WOCN will f/u and please call with questions.

## 2018-09-11 NOTE — PROGRESS NOTES
Discharge Planning Assessment  [facility]     Patient Name: Maria Eugenia Sandoval  MRN: [id number]  Today's Date: 9/11/2018    Admit Date: 9/10/2018          Discharge Needs Assessment     Row Name 09/11/18 0946       Living Environment    Lives With child(joan), adult    Name(s) of Who Lives With Patient Mahad Sandoval(son)    Current Living Arrangements home/apartment/condo    Primary Care Provided by self    Provides Primary Care For no one    Family Caregiver if Needed child(joan), adult    Quality of Family Relationships supportive    Able to Return to Prior Arrangements yes    Living Arrangement Comments Spoke with pt in room with permission in regards to discharge planning. Pt son, Mahad, present.  Pt resides in [facility] with adult son.       Resource/Environmental Concerns    Resource/Environmental Concerns none    Transportation Concerns other (see comments)   Denies transportation concerns       Transition Planning    Patient/Family Anticipates Transition to home with family    Patient/Family Anticipated Services at Transition     Transportation Anticipated car, drives self;family or friend will provide       Discharge Needs Assessment    Readmission Within the Last 30 Days no previous admission in last 30 days    Concerns to be Addressed denies needs/concerns at this time    Equipment Currently Used at Home cane, straight;walker, rolling    Anticipated Changes Related to Illness other (see comments)   Post op recovery    Equipment Needed After Discharge cane, straight;walker, rolling;colostomy/ostomy supplies    Discharge Coordination/Progress Pt has [place] [employer] insurance and denies recent changes in insurance. Pt reports she has prescription coverage and plans to use [facility] retail pharmacy meds to bed or [facility] on [address]. Plan is home with son., If HH needed at discharge, plans to use [facility] as she has used them in the past. CM will cont to follow.            Discharge Plan     Jose  Name 09/11/18 0953       Plan    Plan dischage plan    Patient/Family in Agreement with Plan yes    Plan Comments Plan is home with son. Denies discharge needs at this time. If HH needed, requests [facility]. CM will cont to follow.        Destination     No service coordination in this encounter.      Durable Medical Equipment     No service coordination in this encounter.      Dialysis/Infusion     No service coordination in this encounter.      Home Medical Care     No service coordination in this encounter.      Social Care     No service coordination in this encounter.        Expected Discharge Date and Time     Expected Discharge Date Expected Discharge Time    Sep 14, 2018               Demographic Summary     Row Name 09/11/18 0944       General Information    General Information Comments Pt's PCP is Ravi Walton       Contact Information    Permission Granted to Share Info With     Contact Information Obtained for     Contact Information Comments Mahad Sandoval(son): [phone]            Functional Status    No documentation.           Psychosocial    No documentation.           Abuse/Neglect    No documentation.           Legal    No documentation.           Substance Abuse    No documentation.           Patient Forms    No documentation.         Rosetta Wood, RN

## 2018-09-11 NOTE — PROGRESS NOTES
"Colon and Rectal [CSGA]    POD # 1    /83 (BP Location: Right arm, Patient Position: Lying)   Pulse 66   Temp 98.5 °F (36.9 °C) (Oral)   Resp 18   Ht 175.3 cm (69\")   Wt 75.3 kg (166 lb)   SpO2 97%   BMI 24.51 kg/m²     Lab Results (last 24 hours)     Procedure Component Value Units Date/Time    Anaerobic Culture - Tissue, Pelvis [[id number]]  (Normal) Collected:  09/10/18 1350    Specimen:  Tissue from Pelvis Updated:  09/11/18 1418     Culture No anaerobes isolated    AFB Culture - Tissue, Pelvis [[id number]] Collected:  09/10/18 1350    Specimen:  Tissue from Pelvis Updated:  09/11/18 1241     AFB Stain No acid fast bacilli seen on concentrated smear    Tissue / Bone Culture - Tissue, Pelvis [[id number]] Collected:  09/10/18 1350    Specimen:  Tissue from Pelvis Updated:  09/11/18 0712     Gram Stain Result No WBCs or organisms seen          No intake/output data recorded.    Alert and oriented.  No nausea or vomiting.  Good pain control  Good UO. Labs stable .  BLU serous.  Ileostomy viable.  No flatus or stool yet.  Stable post op course.  Pathology pending.    Order Name Source Comment Collection Info Order Time   ANAEROBIC CULTURE Pelvis  Collected By: Shant Lerner MD 9/10/2018  1:54 PM   FUNGUS CULTURE Pelvis  Collected By: Shant Lerner MD 9/10/2018  1:54 PM   TISSUE / BONE CULTURE Pelvis  Collected By: Shant Lerner MD 9/10/2018  1:54 PM   AFB CULTURE Pelvis  Collected By: Shant Lerner MD 9/10/2018  1:54 PM   TISSUE PATHOLOGY EXAM Large Intestine, Appendix  Collected By: Shant Lerner MD 9/10/2018  1:24 PM   .    Shant ALLEN. MD Yann  09/11/18  7:09 PM    "

## 2018-09-11 NOTE — PLAN OF CARE
Problem: Patient Care Overview  Goal: Plan of Care Review  Outcome: Ongoing (interventions implemented as appropriate)   09/11/18 0428 09/11/18 0800   Plan of Care Review   Progress improving --    OTHER   Outcome Summary Pt had several c/o pain and nausea, relieved some with medication. VSS. Pt otherwise rested well. No flatus or stool from ileostomy. Will continue to monitor. --    Coping/Psychosocial   Plan of Care Reviewed With --  patient     Goal: Discharge Needs Assessment  Outcome: Ongoing (interventions implemented as appropriate)   09/11/18 0946   Discharge Needs Assessment   Readmission Within the Last 30 Days no previous admission in last 30 days   Concerns to be Addressed denies needs/concerns at this time   Patient/Family Anticipates Transition to home with family   Patient/Family Anticipated Services at Transition    Transportation Concerns other (see comments)  (Denies transportation concerns)   Transportation Anticipated car, drives self;family or friend will provide   Anticipated Changes Related to Illness other (see comments)  (Post op recovery)   Equipment Needed After Discharge cane, straight;walker, rolling;colostomy/ostomy supplies   Discharge Coordination/Progress Pt has [place] [employer] insurance and denies recent changes in insurance. Pt reports she has prescription coverage and plans to use [facility] retail pharmacy meds to bed or [facility] on [address]. Plan is home with son., If HH needed at discharge, plans to use [facility] as she has used them in the past. CM will cont to follow.   Disability   Equipment Currently Used at Home cane, straight;walker, rolling       Problem: Pain, Acute (Adult)  Goal: Identify Related Risk Factors and Signs and Symptoms  Outcome: Ongoing (interventions implemented as appropriate)   09/10/18 1754   Pain, Acute (Adult)   Related Risk Factors (Acute Pain) surgery   Signs and Symptoms (Acute Pain) verbalization of pain descriptors     Goal:  Acceptable Pain Control/Comfort Level  Outcome: Ongoing (interventions implemented as appropriate)   09/11/18 0428   Pain, Acute (Adult)   Acceptable Pain Control/Comfort Level making progress toward outcome

## 2018-09-11 NOTE — CONSULTS
[facility] Medicine Services  HISTORY AND PHYSICAL    Patient Name: Maria Eugenia Sandoval  : 1955  MRN: [id number]  Primary Care Physician: Ravi Walton MD  Date of admission: 9/10/2018    Subjective   Subjective   Chief Complaint:  Patient sitting up in bed in NAD.  Patient states that she has gotten up and walk the halls and walk to the bathroom, but states it's very painful.  Colostomy draining stool.  No adverse events overnight.  No family in the room.    HPI:  Maria Eugenia Sandoval is a 63 y.o. female was a direct admission Due to abdominal pain/cramping and diverticulitis who presented for a low anterior resection, appendectomy with possible bilateral salpingo-oophorectomy by Dr. Lerner.  Patient's labs on 2018 were essentially normal.  On 9/10/2018,patient had a sigmoid colectomy, appendectomy takedown of splenic flexure, proctoscopy, umbilical hernia repair, debridement culture of pelvic abscess, diverting loop ileostomy, right ureteral lysis and bilateral salpingo-oophorectomies. Patient was taken to a floor bed and has been ambulating with assistance.  Hospital medicine service was consulted for medical management of this patient.    Review of Systems   Gen- No fevers, chills  CV- No chest pain, palpitations  Resp- No cough, dyspnea  GI- No N/V/D;  +abd pain s/p surgery  Otherwise 10-system ROS reviewed and is negative except as mentioned in the HPI.  Personal History     Past Medical History:   Diagnosis Date   • Anxiety    • Arthritis    • Cataract    • Disease of thyroid gland    • Diverticulitis    • GERD (gastroesophageal reflux disease)    • Heart disease    • Wears contact lenses    • Wears glasses      Past Surgical History:   Procedure Laterality Date   • ANKLE OPEN REDUCTION INTERNAL FIXATION     • COLON RESECTION N/A 9/10/2018    Procedure: SIGMOID COLECTOMY, APPENDECTOMY, TAKEDOWN OF SPLENIC FEXURE,PROCTOSCOPY, UMBILICAL HERNIA REPAIR, DEBRIDEMENT & CULTURE OF  PELVIC ABCESS,DIVERTING LOOP ILEOSTOMY, RIGHT URETEROLYSIS, BILATERAL SALPINGO OOPHORECTOMY;  Surgeon: Shant Lerner MD;  Location:  [facility];  Service: General   • COLONOSCOPY     • ENDOSCOPY     • TOTAL HIP ARTHROPLASTY Right 12/5/2017    Procedure: TOTAL HIP ARTHROPLASTY RIGHT;  Surgeon: Marek Talamantes MD;  Location:  [facility];  Service:    • TUMOR REMOVAL       Family History: family history includes Osteoarthritis in her mother.     Social History:  reports that she has been smoking Cigarettes.  She has a 3.75 pack-year smoking history. She has never used smokeless tobacco. She reports that she drinks alcohol. She reports that she does not use drugs.  Social History     Social History Narrative   • No narrative on file     Medications:  Available home medication information reviewed     No Known Allergies    Objective   Objective   Vital Signs:   Temp:  [95.9 °F (35.5 °C)-98.7 °F (37.1 °C)] 98.5 °F (36.9 °C)  Heart Rate:  [53-72] 66  Resp:  [16-20] 18  BP: ()/(59-84) 145/83   Physical Exam   General: Alert, well-developed well-nourished female in no acute distress    Head: Normocephalic atraumatic    Eyes: PERRLA, EOMI, nonicteric, conjunctiva normal    ENT: Pink, moist mucous membranes    Neck: Supple, nontender, trachea midline without lymphadenopathy, JVD, nuchal rigidity.      Cardiovascular: RRR  no M/R/G +DP pulses bilaterally    Respiratory: Nonlabored, symmetrical chest expansion, clear to auscultation bilaterally    Abdomen: Soft, tender, distended,  Right sided colostomy draining dark stool; abdominal incisions with dressings-CDI and one Hiren-Medina drain small amount draining serosanguineous fluid    Extremities: FROM in upper and lower extremities bilaterally. Negative for edema.  Negative calf pain    Skin: Pink/warm/dry.  No rash or lesions noted    Neuro: Oriented to person place time and situation, speech is clear, follows all commands, recent and remote memory intact    Psych:  Patient is pleasant and cooperative.  Normal affect.  Negative suicidal ideation or homicidal ideation.    Results Reviewed:  I have personally reviewed current lab, radiology, and data and agree.    Results from last 7 days  Lab Units 09/05/18  1211   WBC 10*3/mm3 5.81   HEMOGLOBIN g/dL 14.1   HEMATOCRIT % 42.3   PLATELETS 10*3/mm3 239       Results from last 7 days  Lab Units 09/05/18  1211   SODIUM mmol/L 141   POTASSIUM mmol/L 4.2   CHLORIDE mmol/L 107   CO2 mmol/L 26.0   BUN mg/dL 10   CREATININE mg/dL 0.55*   GLUCOSE mg/dL 89   CALCIUM mg/dL 9.2   ALT (SGPT) U/L 25   AST (SGOT) U/L 27     Estimated Creatinine Clearance: 127.7 mL/min (A) (by C-G formula based on SCr of 0.55 mg/dL (L)).  Brief Urine Lab Results  (Last result in the past 365 days)      Color   Clarity   Blood   Leuk Est   Nitrite   Protein   CREAT   Urine HCG        11/21/17 1123 Yellow Clear Negative Negative Negative Negative             No results found for: BNP  Imaging Results (last 24 hours)     Procedure Component Value Units Date/Time    XR Chest PA & Lateral [[id number]] Collected:  09/10/18 1245     Updated:  09/10/18 1600    Narrative:       EXAMINATION: XR CHEST, PA AND LATERAL-09/10/2018:      INDICATION: Pre procedure.      COMPARISON: NONE.     FINDINGS: PA and lateral views of the chest reveal cardiac and  mediastinal silhouettes to be within normal limits. Underlying chronic  and emphysematous changes seen within the lung fields bilaterally.  Calcified granuloma identified within the right lung base.  No pleural  effusion or pneumothorax. Degenerative changes seen within the spine.  Apical pleural thickening identified on the right.           Impression:       Chronic and emphysematous changes seen within the lung  fields with no evidence of acute parenchymal disease.     D:  09/10/2018  E:  09/10/2018     This report was finalized on 9/10/2018 3:58 PM by Dr. Rosaline Carver MD.           Assessment/Plan   Assessment / Plan      Hospital Problem List     Hypothyroid    Tobacco abuse    Diverticulitis        Assessment & Plan:  Hypothyroidism  --TSH pending  --Continue home dose TSH    Tobacco dependence  --Tobacco cessation education  --Nicotine patches ordered    Bowel  --Senokot BID  --Add additional as needed    DVT prophylaxis:  SCDs    CODE STATUS:    Code Status and Medical Interventions:   Ordered at: 09/10/18 0990     Code Status:    CPR     Medical Interventions (Level of Support Prior to Arrest):    Full     Electronically signed by MARÍA Worrell, 09/11/18, 3:24 PM.

## 2018-09-12 LAB
ANION GAP SERPL CALCULATED.3IONS-SCNC: 7 MMOL/L (ref 3–11)
BUN BLD-MCNC: 6 MG/DL (ref 9–23)
BUN/CREAT SERPL: 11.3 (ref 7–25)
CALCIUM SPEC-SCNC: 8.8 MG/DL (ref 8.7–10.4)
CHLORIDE SERPL-SCNC: 102 MMOL/L (ref 99–109)
CO2 SERPL-SCNC: 28 MMOL/L (ref 20–31)
CREAT BLD-MCNC: 0.53 MG/DL (ref 0.6–1.3)
CYTO UR: NORMAL
DEPRECATED RDW RBC AUTO: 49.3 FL (ref 37–54)
ERYTHROCYTE [DISTWIDTH] IN BLOOD BY AUTOMATED COUNT: 13.6 % (ref 11.3–14.5)
GFR SERPL CREATININE-BSD FRML MDRD: 117 ML/MIN/1.73
GLUCOSE BLD-MCNC: 82 MG/DL (ref 70–100)
HCT VFR BLD AUTO: 33.1 % (ref 34.5–44)
HGB BLD-MCNC: 10.9 G/DL (ref 11.5–15.5)
LAB AP CASE REPORT: NORMAL
LAB AP CLINICAL INFORMATION: NORMAL
MCH RBC QN AUTO: 33.1 PG (ref 27–31)
MCHC RBC AUTO-ENTMCNC: 32.9 G/DL (ref 32–36)
MCV RBC AUTO: 100.6 FL (ref 80–99)
PATH REPORT.FINAL DX SPEC: NORMAL
PATH REPORT.GROSS SPEC: NORMAL
PLATELET # BLD AUTO: 193 10*3/MM3 (ref 150–450)
PMV BLD AUTO: 11.8 FL (ref 6–12)
POTASSIUM BLD-SCNC: 3.8 MMOL/L (ref 3.5–5.5)
RBC # BLD AUTO: 3.29 10*6/MM3 (ref 3.89–5.14)
SODIUM BLD-SCNC: 137 MMOL/L (ref 132–146)
TSH SERPL DL<=0.05 MIU/L-ACNC: 1.59 MIU/ML (ref 0.35–5.35)
WBC NRBC COR # BLD: 7.3 10*3/MM3 (ref 3.5–10.8)

## 2018-09-12 PROCEDURE — 25010000002 ENOXAPARIN PER 10 MG: Performed by: COLON & RECTAL SURGERY

## 2018-09-12 PROCEDURE — 84443 ASSAY THYROID STIM HORMONE: CPT | Performed by: NURSE PRACTITIONER

## 2018-09-12 PROCEDURE — 85027 COMPLETE CBC AUTOMATED: CPT | Performed by: NURSE PRACTITIONER

## 2018-09-12 PROCEDURE — 25010000002 ERTAPENEM 1 GM/100ML SOLUTION: Performed by: COLON & RECTAL SURGERY

## 2018-09-12 PROCEDURE — 80048 BASIC METABOLIC PNL TOTAL CA: CPT | Performed by: NURSE PRACTITIONER

## 2018-09-12 PROCEDURE — 25010000002 KETOROLAC TROMETHAMINE PER 15 MG: Performed by: COLON & RECTAL SURGERY

## 2018-09-12 RX ADMIN — DIAZEPAM 5 MG: 5 TABLET ORAL at 01:11

## 2018-09-12 RX ADMIN — HYDROCODONE BITARTRATE AND ACETAMINOPHEN 1 TABLET: 7.5; 325 TABLET ORAL at 01:11

## 2018-09-12 RX ADMIN — LEVOTHYROXINE SODIUM 112 MCG: 112 TABLET ORAL at 05:53

## 2018-09-12 RX ADMIN — KETOROLAC TROMETHAMINE 30 MG: 30 INJECTION, SOLUTION INTRAMUSCULAR; INTRAVENOUS at 07:58

## 2018-09-12 RX ADMIN — HYDROCODONE BITARTRATE AND ACETAMINOPHEN 1 TABLET: 7.5; 325 TABLET ORAL at 20:23

## 2018-09-12 RX ADMIN — NICOTINE 1 PATCH: 21 PATCH, EXTENDED RELEASE TRANSDERMAL at 07:58

## 2018-09-12 RX ADMIN — ENOXAPARIN SODIUM 40 MG: 40 INJECTION SUBCUTANEOUS at 07:58

## 2018-09-12 RX ADMIN — HYDROCODONE BITARTRATE AND ACETAMINOPHEN 1 TABLET: 7.5; 325 TABLET ORAL at 11:55

## 2018-09-12 RX ADMIN — ERTAPENEM SODIUM 1 G: 1 INJECTION, POWDER, LYOPHILIZED, FOR SOLUTION INTRAMUSCULAR; INTRAVENOUS at 18:13

## 2018-09-12 NOTE — PLAN OF CARE
Problem: Ileostomy (Adult)  Intervention: Promote Pulmonary Hygiene and Secretion Clearance   09/12/18 1323   Positioning   Head of Bed (HOB) HOB at 30 degrees     Intervention: Monitor and Manage Acute Postoperative Pain   09/12/18 1323   Promote Oxygenation/Perfusion   Pain Management Interventions awakened for pain meds per patient request;breathing exercises;care clustered;pain management plan reviewed with patient/caregiver;pillow support provided;see MAR     Intervention: Prevent/Manage Colorectal Surgical Infection   09/12/18 1323   Prevent/Manage Colorectal Surgical Infection   Fever Reduction/Comfort Measures lightweight clothing   Safety Management   Infection Prevention cohorting utilized;rest/sleep promoted;single patient room provided     Intervention: Promote Stoma Healing/Integrity   09/12/18 1323   Skin Interventions   Dehiscence Prevention/Management wound/incision splinting encouraged   Gastrointestinal (GI) Interventions   Peristomal Herniation Prevention abdomen splinted during cough, movement     Intervention: Promote Surgical Wound Healing/Prevent Dehiscence   09/12/18 1323   Skin Interventions   Dehiscence Prevention/Management wound/incision splinting encouraged   Adjust Diet to Limit Bowel Elimination   Nutrition Interventions meal setup provided   Pain/Comfort/Sleep Interventions   Sleep/Rest Enhancement awakenings minimized;noise level reduced     Intervention: Maintain Stomal Patency   09/12/18 1323   Nutrition Interventions   Oral Nutrition Promotion nutritional therapy counseling provided;physical activity promoted;rest periods promoted     Intervention: Provide Meticulous Peristomal Skin Protection   09/12/18 1323   Skin Interventions   Skin Protection adhesive use limited;protective footwear used       Goal: Signs and Symptoms of Listed Potential Problems Will be Absent, Minimized or Managed (Ileostomy)  Outcome: Ongoing (interventions implemented as appropriate)   09/12/18 1323    Goal/Outcome Evaluation   Problems Assessed (Ileostomy) bowel motility decreased;pain;situational response   Problems Present (Ileostomy) pain     Goal: Anesthesia/Sedation Recovery  Outcome: Outcome(s) achieved Date Met: 09/12/18 09/12/18 7526   Goal/Outcome Evaluation   Anesthesia/Sedation Recovery criteria met for transfer

## 2018-09-12 NOTE — PLAN OF CARE
Problem: Patient Care Overview  Goal: Plan of Care Review  Outcome: Ongoing (interventions implemented as appropriate)   09/12/18 1030   Plan of Care Review   Progress improving   OTHER   Outcome Summary At this time appliance is intact. Stoma is pink, moist, good out put, and bridge is in place. Talked with patient and set up time for tomorrow at 1000 for extensive education, appliance change, and bridge removal. Patients sons will be there as well. Please contact if needs needs arise. Thanks    Coping/Psychosocial   Plan of Care Reviewed With patient

## 2018-09-12 NOTE — PLAN OF CARE
Problem: Patient Care Overview  Goal: Plan of Care Review  Outcome: Ongoing (interventions implemented as appropriate)   09/11/18 0428 09/12/18 0000 09/12/18 0411   Plan of Care Review   Progress improving --  --    OTHER   Outcome Summary --  --  Pt had one c/o pain, releived with medication. Ambulated once in [place], no complications. VSS. Adequate urine output post-D/C of catheter. Will continue to monitor.   Coping/Psychosocial   Plan of Care Reviewed With --  patient --      Goal: Individualization and Mutuality  Outcome: Ongoing (interventions implemented as appropriate)    Goal: Discharge Needs Assessment  Outcome: Ongoing (interventions implemented as appropriate)   09/11/18 0946   Discharge Needs Assessment   Readmission Within the Last 30 Days no previous admission in last 30 days   Concerns to be Addressed denies needs/concerns at this time   Patient/Family Anticipates Transition to home with family   Patient/Family Anticipated Services at Transition    Transportation Concerns other (see comments)  (Denies transportation concerns)   Transportation Anticipated car, drives self;family or friend will provide   Anticipated Changes Related to Illness other (see comments)  (Post op recovery)   Equipment Needed After Discharge cane, straight;walker, rolling;colostomy/ostomy supplies   Discharge Coordination/Progress Pt has [place] [employer] insurance and denies recent changes in insurance. Pt reports she has prescription coverage and plans to use [facility] retail pharmacy meds to bed or [facility] [address]. Plan is home with son., If HH needed at discharge, plans to use [facility] as she has used them in the past. CM will cont to follow.   Disability   Equipment Currently Used at Home cane, straight;walker, rolling     Goal: Interprofessional Rounds/Family Conf  Outcome: Ongoing (interventions implemented as appropriate)      Problem: Pain, Acute (Adult)  Goal: Identify Related Risk Factors and Signs  and Symptoms  Outcome: Ongoing (interventions implemented as appropriate)   09/10/18 1754   Pain, Acute (Adult)   Related Risk Factors (Acute Pain) surgery   Signs and Symptoms (Acute Pain) verbalization of pain descriptors     Goal: Acceptable Pain Control/Comfort Level  Outcome: Ongoing (interventions implemented as appropriate)   09/12/18 0411   Pain, Acute (Adult)   Acceptable Pain Control/Comfort Level making progress toward outcome

## 2018-09-12 NOTE — PROGRESS NOTES
Continued Stay Note  [facility]     Patient Name: Maria Eugenia Sandoval  MRN: [id number]  Today's Date: 9/12/2018    Admit Date: 9/10/2018          Discharge Plan     Row Name 09/12/18 1527       Plan    Plan discharge plan    Patient/Family in Agreement with Plan yes    Plan Comments Plan remains home at discharge. If pt HH needed, requests [facility]. CM will cont to follow.              Discharge Codes    No documentation.       Expected Discharge Date and Time     Expected Discharge Date Expected Discharge Time    Sep 14, 2018             Rosetta Wood RN

## 2018-09-12 NOTE — PROGRESS NOTES
"Colon and Rectal [CSGA]    POD # 2    /75 (BP Location: Right arm, Patient Position: Lying)   Pulse 72   Temp 97.8 °F (36.6 °C) (Oral)   Resp 17   Ht 175.3 cm (69\")   Wt 75.3 kg (166 lb)   SpO2 98%   BMI 24.51 kg/m²     Lab Results (last 24 hours)     Procedure Component Value Units Date/Time    Tissue / Bone Culture - Tissue, Pelvis [[id number]]  (Normal) Collected:  09/10/18 1350    Specimen:  Tissue from Pelvis Updated:  09/12/18 0712     Tissue Culture No growth at 2 days     Gram Stain Result No WBCs or organisms seen    TSH [[id number]]  (Normal) Collected:  09/12/18 0458    Specimen:  Blood Updated:  09/12/18 0649     TSH 1.587 mIU/mL     Basic Metabolic Panel [[id number]]  (Abnormal) Collected:  09/12/18 0458    Specimen:  Blood Updated:  09/12/18 0649     Glucose 82 mg/dL      BUN 6 (L) mg/dL      Creatinine 0.53 (L) mg/dL      Sodium 137 mmol/L      Potassium 3.8 mmol/L      Chloride 102 mmol/L      CO2 28.0 mmol/L      Calcium 8.8 mg/dL      eGFR Non African Amer 117 mL/min/1.73      BUN/Creatinine Ratio 11.3     Anion Gap 7.0 mmol/L     Narrative:       National [employer] Guidelines    Stage     Description        GFR  1         Normal or High     90+  2         Mild decrease      60-89  3         Moderate decrease  30-59  4         Severe decrease    15-29  5         Kidney failure     <15    The MDRD GFR formula is only valid for adults with stable renal function between ages 18 and 70.    CBC (No Diff) [[id number]]  (Abnormal) Collected:  09/12/18 0458    Specimen:  Blood Updated:  09/12/18 0626     WBC 7.30 10*3/mm3      RBC 3.29 (L) 10*6/mm3      Hemoglobin 10.9 (L) g/dL      Hematocrit 33.1 (L) %      .6 (H) fL      MCH 33.1 (H) pg      MCHC 32.9 g/dL      RDW 13.6 %      RDW-SD 49.3 fl      MPV 11.8 fL      Platelets 193 10*3/mm3           I/O this shift:  In: 320 [P.O.:320]  Out: 250 [Urine:250]    Alert and oriented.  No nausea or vomiting.  Good pain control  Good UO. " Labs stable  Learning to care for her ileostomy.  Hiren-Medina serous  No growth from the pelvic inflammation.  Possibly home Thursday or Friday.    Order Name Source Comment Collection Info Order Time   ANAEROBIC CULTURE Pelvis  Collected By: Shant Lerner MD 9/10/2018  1:54 PM   FUNGUS CULTURE Pelvis  Collected By: Shant Lerner MD 9/10/2018  1:54 PM   TISSUE / BONE CULTURE Pelvis  Collected By: Shant Lerner MD 9/10/2018  1:54 PM   AFB CULTURE Pelvis  Collected By: Shant Lerner MD 9/10/2018  1:54 PM   TISSUE PATHOLOGY EXAM Large Intestine, Appendix  Collected By: Shant Lerner MD 9/10/2018  1:24 PM   .    Shant ALLEN. MD Yann  09/12/18  4:20 PM

## 2018-09-13 VITALS
HEIGHT: 69 IN | WEIGHT: 166 LBS | DIASTOLIC BLOOD PRESSURE: 74 MMHG | TEMPERATURE: 97.4 F | RESPIRATION RATE: 18 BRPM | SYSTOLIC BLOOD PRESSURE: 125 MMHG | BODY MASS INDEX: 24.59 KG/M2 | HEART RATE: 74 BPM | OXYGEN SATURATION: 94 %

## 2018-09-13 PROCEDURE — 25010000002 KETOROLAC TROMETHAMINE PER 15 MG: Performed by: COLON & RECTAL SURGERY

## 2018-09-13 PROCEDURE — 99239 HOSP IP/OBS DSCHRG MGMT >30: CPT | Performed by: NURSE PRACTITIONER

## 2018-09-13 PROCEDURE — 25010000002 ENOXAPARIN PER 10 MG: Performed by: COLON & RECTAL SURGERY

## 2018-09-13 RX ORDER — HYDROCODONE BITARTRATE AND ACETAMINOPHEN 7.5; 325 MG/1; MG/1
1 TABLET ORAL EVERY 6 HOURS PRN
Qty: 12 TABLET | Refills: 0 | Status: SHIPPED | OUTPATIENT
Start: 2018-09-13 | End: 2018-09-16

## 2018-09-13 RX ORDER — SENNA AND DOCUSATE SODIUM 50; 8.6 MG/1; MG/1
2 TABLET, FILM COATED ORAL 2 TIMES DAILY
Qty: 60 TABLET | Refills: 0 | Status: ON HOLD | OUTPATIENT
Start: 2018-09-13 | End: 2019-02-11

## 2018-09-13 RX ADMIN — ENOXAPARIN SODIUM 40 MG: 40 INJECTION SUBCUTANEOUS at 08:30

## 2018-09-13 RX ADMIN — HYDROCODONE BITARTRATE AND ACETAMINOPHEN 1 TABLET: 7.5; 325 TABLET ORAL at 04:08

## 2018-09-13 RX ADMIN — KETOROLAC TROMETHAMINE 30 MG: 30 INJECTION, SOLUTION INTRAMUSCULAR; INTRAVENOUS at 14:55

## 2018-09-13 RX ADMIN — LEVOTHYROXINE SODIUM 112 MCG: 112 TABLET ORAL at 06:09

## 2018-09-13 RX ADMIN — KETOROLAC TROMETHAMINE 30 MG: 30 INJECTION, SOLUTION INTRAMUSCULAR; INTRAVENOUS at 08:27

## 2018-09-13 NOTE — DISCHARGE SUMMARY
[facility] Medicine Services  DISCHARGE SUMMARY    Patient Name: Maria Eugenia Sandoval  : 1955  MRN: [id number]    Date of Admission: 9/10/2018  Date of Discharge:  18  Primary Care Physician: Ravi Walton MD    Consults     Date and Time Order Name Status Description    9/10/2018 1650 Inpatient Hospitalist Consult Completed         Hospital Course     Presenting Problem:   Diverticulitis [K57.92]  Diverticulitis [K57.92]    Active Hospital Problems    Diagnosis Date Noted   • Diverticulitis [K57.92] 09/10/2018   • Hypothyroid [E03.9] 2017   • Tobacco abuse [Z72.0] 2017      Resolved Hospital Problems    Diagnosis Date Noted Date Resolved   No resolved problems to display.          Hospital Course:  Maria Eugenia Sandoval is a 63 y.o. female with pmh significant for tobacco abuse, hypothyroidism, and recurrent diverticulitis who was directly admitted on 9/10 for repair. She had lower anterior resection with ileostomy, appendectomy, umbilical hernia repair, BSO and debridement/ culture of pelvic abscess. Cultures have no growth to date and has not required antibiotics.    She has had stable post operative course. Diet advanced, voiding well and ambulating without significant discomfort. Patient to be discharged home today with appropriate follow up.       Discharge Follow Up Recommendations for labs/diagnostics:  2 weeks pcp  Dr. Lerner as scheduled    Day of Discharge     HPI:   Patient sitting up in chair after dinner and feeling well. No n/v. Voiding well and ambulating well. No f/c/s    Review of Systems  Gen- No fevers, chills  CV- No chest pain, palpitations  Resp- No cough, dyspnea  GI- No N/V/D, abd pain      Otherwise ROS is negative except as mentioned in the HPI.    Vital Signs:   Temp:  [97.4 °F (36.3 °C)-97.8 °F (36.6 °C)] 97.4 °F (36.3 °C)  Heart Rate:  [73-74] 74  Resp:  [18] 18  BP: (117-125)/(70-74) 125/74     Physical Exam:  Constitutional: No acute distress,  awake, alert  HENT: NCAT, mucous membranes moist  Respiratory: Clear to auscultation bilaterally, respiratory effort normal   Cardiovascular: RRR, no murmurs, rubs, or gallops, palpable pedal pulses bilaterally  Gastrointestinal: Positive bowel sounds, soft, approp tender, + ileostomy w stool  Musculoskeletal: No bilateral ankle edema  Psychiatric: Appropriate affect, cooperative  Neurologic: Oriented x 3, strength symmetric in all extremities, Cranial Nerves grossly intact to confrontation, speech clear  Skin: No rashes      Pertinent  and/or Most Recent Results       Results from last 7 days  Lab Units 09/12/18  0458   WBC 10*3/mm3 7.30   HEMOGLOBIN g/dL 10.9*   HEMATOCRIT % 33.1*   PLATELETS 10*3/mm3 193   SODIUM mmol/L 137   POTASSIUM mmol/L 3.8   CHLORIDE mmol/L 102   CO2 mmol/L 28.0   BUN mg/dL 6*   CREATININE mg/dL 0.53*   GLUCOSE mg/dL 82   CALCIUM mg/dL 8.8           Invalid input(s): PROT, LABALBU        Invalid input(s): TG, LDLCALC, LDLREALC    Results from last 7 days  Lab Units 09/12/18  0458   TSH mIU/mL 1.587     Brief Urine Lab Results  (Last result in the past 365 days)      Color   Clarity   Blood   Leuk Est   Nitrite   Protein   CREAT   Urine HCG        11/21/17 1123 Yellow Clear Negative Negative Negative Negative               Microbiology Results Abnormal     Procedure Component Value - Date/Time    Tissue / Bone Culture - Tissue, Pelvis [[id number]]  (Normal) Collected:  09/10/18 1350    Lab Status:  Preliminary result Specimen:  Tissue from Pelvis Updated:  09/13/18 0708     Tissue Culture No growth at 3 days     Gram Stain Result No WBCs or organisms seen    Anaerobic Culture - Tissue, Pelvis [[id number]]  (Normal) Collected:  09/10/18 1350    Lab Status:  Preliminary result Specimen:  Tissue from Pelvis Updated:  09/11/18 1418     Culture No anaerobes isolated    AFB Culture - Tissue, Pelvis [[id number]] Collected:  09/10/18 1350    Lab Status:  Preliminary result Specimen:  Tissue from  Pelvis Updated:  09/11/18 1241     AFB Stain No acid fast bacilli seen on concentrated smear          Imaging Results (all)     Procedure Component Value Units Date/Time    XR Chest PA & Lateral [[id number]] Collected:  09/10/18 1245     Updated:  09/10/18 1600    Narrative:       EXAMINATION: XR CHEST, PA AND LATERAL-09/10/2018:      INDICATION: Pre procedure.      COMPARISON: NONE.     FINDINGS: PA and lateral views of the chest reveal cardiac and  mediastinal silhouettes to be within normal limits. Underlying chronic  and emphysematous changes seen within the lung fields bilaterally.  Calcified granuloma identified within the right lung base.  No pleural  effusion or pneumothorax. Degenerative changes seen within the spine.  Apical pleural thickening identified on the right.           Impression:       Chronic and emphysematous changes seen within the lung  fields with no evidence of acute parenchymal disease.     D:  09/10/2018  E:  09/10/2018     This report was finalized on 9/10/2018 3:58 PM by Dr. Rosaline Carver MD.                             Order Current Status    Fungus Culture - Tissue, Pelvis In process    AFB Culture - Tissue, Pelvis Preliminary result    Anaerobic Culture - Tissue, Pelvis Preliminary result    Tissue / Bone Culture - Tissue, Pelvis Preliminary result        Discharge Details        Discharge Medications      New Medications      Instructions Start Date   HYDROcodone-acetaminophen 7.5-325 MG per tablet  Commonly known as:  NORCO   1 tablet, Oral, Every 6 Hours PRN      sennosides-docusate sodium 8.6-50 MG tablet  Commonly known as:  SENOKOT-S   2 tablets, Oral, 2 Times Daily         Continue These Medications      Instructions Start Date   aspirin 81 MG EC tablet   81 mg, Oral, Daily      calcium carbonate 600 MG tablet  Commonly known as:  OS-YASMINE   600 mg, Oral, Daily      Cholecalciferol 25749 units capsule   Oral, Weekly      ibuprofen 200 MG tablet  Commonly known as:   ADVIL,MOTRIN   600 mg, Oral, Every 6 Hours PRN, Must take an hour after aspirin if needed.      levothyroxine 112 MCG tablet  Commonly known as:  SYNTHROID, LEVOTHROID   112 mcg, Oral, Daily      PB-Hyoscy-Atropine-Scopolamine 16.2 MG per tablet  Commonly known as:  DONNATAL   1 tablet, Oral, Every 6 Hours PRN         Stop These Medications    amoxicillin-clavulanate 875-125 MG per tablet  Commonly known as:  AUGMENTIN              Discharge Disposition:  Home or Self Care    Discharge Diet:  Diet Instructions     Advance Diet As Tolerated       Diet: Regular; Thin       Discharge Diet:  Regular    Fluid Consistency:  Thin          Discharge Activity:   Activity Instructions     Activity as Tolerated                 Special Instructions:  Lifting instructions per Dr. Garciah: nothing greater than 5-10lb until follow up  Code Status/Level of Support:  Code Status and Medical Interventions:   Ordered at: 09/10/18 1650     Code Status:    CPR     Medical Interventions (Level of Support Prior to Arrest):    Full       Future Appointments  Date Time Provider Department Center   12/5/2018 3:10 PM Marek Talamantes MD [facility] None       Additional Instructions for the Follow-ups that You Need to Schedule     Discharge Follow-up with PCP    As directed      Currently Documented PCP:  Ravi Walton MD  PCP Phone Number:  [phone]    Follow Up Details:  2 weeks         Discharge Follow-up with Specified Provider: dr. garcia as scheduled    As directed      To:  dr. garcia as scheduled               Time Spent on Discharge:  31 minutes    Electronically signed by MARÍA Hanson, 09/13/18, 5:22 PM.

## 2018-09-13 NOTE — PLAN OF CARE
Problem: Ileostomy (Adult)  Intervention: Promote Stoma Healing/Integrity   09/13/18 1611   Gastrointestinal (GI) Interventions   Peristomal Herniation Prevention abdomen splinted during cough, movement  (with splint pillow)     Intervention: Promote Surgical Wound Healing/Prevent Dehiscence   09/13/18 1611   Pain/Comfort/Sleep Interventions   Sleep/Rest Enhancement family presence promoted     Intervention: Maintain Stomal Patency   09/13/18 1611   Gastrointestinal (GI) Interventions   Bowel Func Promotn:Ileostomy,IPAA(Stg 1) diet adjusted to output   Nutrition Interventions   Oral Nutrition Promotion physical activity promoted;rest periods promoted     Intervention: Provide Meticulous Peristomal Skin Protection   09/13/18 1611   Skin Interventions   Skin Protection adhesive use limited;incontinence pads utilized

## 2018-09-13 NOTE — PROGRESS NOTES
Continued Stay Note  [facility]     Patient Name: Maria Eugenia Sandoval  MRN: [id number]  Today's Date: 9/13/2018    Admit Date: 9/10/2018          Discharge Plan     Row Name 09/13/18 1514       Plan    Plan discharge plan    Patient/Family in Agreement with Plan yes    Plan Comments Spoke with pt in room with permission in regards to discharge planning. Pt interested in HH with [facility] since she has a new illeostomy.  Pt will need HH order. CM will follow up tomorrow.              Discharge Codes    No documentation.       Expected Discharge Date and Time     Expected Discharge Date Expected Discharge Time    Sep 14, 2018             Rosetta Wood RN

## 2018-09-13 NOTE — PLAN OF CARE
Problem: Patient Care Overview  Goal: Plan of Care Review  Outcome: Ongoing (interventions implemented as appropriate)   09/12/18 1030 09/12/18 2200 09/13/18 0355   Plan of Care Review   Progress improving --  --    OTHER   Outcome Summary --  --  Pt had few c/o pain, relieved with medication. Ambulating well, adequate UOP. VSS. Will continue to monitor.   Coping/Psychosocial   Plan of Care Reviewed With --  patient --      Goal: Individualization and Mutuality  Outcome: Ongoing (interventions implemented as appropriate)    Goal: Discharge Needs Assessment  Outcome: Ongoing (interventions implemented as appropriate)   09/11/18 0946   Discharge Needs Assessment   Readmission Within the Last 30 Days no previous admission in last 30 days   Concerns to be Addressed denies needs/concerns at this time   Patient/Family Anticipates Transition to home with family   Patient/Family Anticipated Services at Transition    Transportation Concerns other (see comments)  (Denies transportation concerns)   Transportation Anticipated car, drives self;family or friend will provide   Anticipated Changes Related to Illness other (see comments)  (Post op recovery)   Equipment Needed After Discharge cane, straight;walker, rolling;colostomy/ostomy supplies   Discharge Coordination/Progress Pt has [employer] insurance and denies recent changes in insurance. Pt reports she has prescription coverage and plans to use [facility] retail pharmacy meds to bed or [facility] on [address]. Plan is home with son., If HH needed at discharge, plans to use [facility] as she has used them in the past. CM will cont to follow.   Disability   Equipment Currently Used at Home cane, straight;walker, rolling     Goal: Interprofessional Rounds/Family Conf  Outcome: Ongoing (interventions implemented as appropriate)      Problem: Pain, Acute (Adult)  Goal: Identify Related Risk Factors and Signs and Symptoms  Outcome: Ongoing (interventions  implemented as appropriate)   09/10/18 1754   Pain, Acute (Adult)   Related Risk Factors (Acute Pain) surgery   Signs and Symptoms (Acute Pain) verbalization of pain descriptors     Goal: Acceptable Pain Control/Comfort Level  Outcome: Ongoing (interventions implemented as appropriate)   09/13/18 0355   Pain, Acute (Adult)   Acceptable Pain Control/Comfort Level making progress toward outcome       Problem: Ileostomy (Adult)  Goal: Signs and Symptoms of Listed Potential Problems Will be Absent, Minimized or Managed (Ileostomy)  Outcome: Ongoing (interventions implemented as appropriate)   09/13/18 0355   Goal/Outcome Evaluation   Problems Assessed (Ileostomy) all   Problems Present (Ileostomy) pain

## 2018-09-13 NOTE — PLAN OF CARE
Problem: Patient Care Overview  Goal: Plan of Care Review  Outcome: Ongoing (interventions implemented as appropriate)   09/13/18 1127   Plan of Care Review   Progress improving   OTHER   Outcome Summary Appliance change performed, stomal bridge removed, and extensive education done with patient and son. Very nice stoma and in good pouching location. Placed [place] Premier 8331. Stoma measured 38mm. Peristomal skin is intact. Stoma is pink, moist, and good output. Discharge supplies provided to patient. No identifiable issues. Patient in minimal pain and ambulating well. Please contact [facility] nurse if needs arise. Thanks    Coping/Psychosocial   Plan of Care Reviewed With patient;son

## 2018-09-14 LAB
BACTERIA SPEC AEROBE CULT: NORMAL
GRAM STN SPEC: NORMAL

## 2018-09-14 NOTE — PROGRESS NOTES
Case Management Discharge Note    Final Note: Pt discharged home and HH arranged with [facility] per pt request. Spoke with Hao at [facility] and she is aware pt discharged yesterday and she will contact pt today to see over weekend.     Destination     No service has been selected for the patient.      Durable Medical Equipment     No service has been selected for the patient.      Dialysis/Infusion     No service has been selected for the patient.      Home Medical Care     No service has been selected for the patient.      Social Care     No service has been selected for the patient.             Final Discharge Disposition Code: 06 - home with home health care

## 2018-09-22 LAB — BACTERIA SPEC ANAEROBE CULT: ABNORMAL

## 2018-10-22 LAB
FUNGUS WND CULT: NORMAL
MYCOBACTERIUM SPEC CULT: NORMAL
NIGHT BLUE STAIN TISS: NORMAL

## 2018-11-05 ENCOUNTER — TELEPHONE (OUTPATIENT)
Dept: ORTHOPEDIC SURGERY | Facility: CLINIC | Age: 63
End: 2018-11-05

## 2018-11-05 NOTE — TELEPHONE ENCOUNTER
PT HAD DIVERTICULITIS. SHE HAD ILEOSTOMY AND WANTS TO KNOW IF SHE IS STILL ABLE TO COME IN FOR HER APPOINTMENT ON 12/5/18. SHE WOULD ALSO LIKE ANTIBIOTICS FOR HER DENTAL CLEANING NEXT Monday. HER NUMBER [phone].

## 2018-11-05 NOTE — TELEPHONE ENCOUNTER
I spoke with the patient and told her that we can xray with a bag, that is no problem.   She has cephalexin at home and I told her that she will need to take 4  500 mgs one hour prior to dental appt.

## 2018-12-05 ENCOUNTER — OFFICE VISIT (OUTPATIENT)
Dept: ORTHOPEDIC SURGERY | Facility: CLINIC | Age: 63
End: 2018-12-05

## 2018-12-05 VITALS — HEART RATE: 55 BPM | BODY MASS INDEX: 23.15 KG/M2 | HEIGHT: 69 IN | WEIGHT: 156.31 LBS | OXYGEN SATURATION: 99 %

## 2018-12-05 DIAGNOSIS — Z96.641 STATUS POST TOTAL REPLACEMENT OF RIGHT HIP: Primary | ICD-10-CM

## 2018-12-05 DIAGNOSIS — Z09 POSTOPERATIVE EXAMINATION: ICD-10-CM

## 2018-12-05 PROCEDURE — 99213 OFFICE O/P EST LOW 20 MIN: CPT | Performed by: ORTHOPAEDIC SURGERY

## 2018-12-05 RX ORDER — NEOMYCIN SULFATE 500 MG/1
TABLET ORAL
Refills: 0 | Status: ON HOLD | COMMUNITY
Start: 2018-09-04 | End: 2019-02-11

## 2018-12-05 ASSESSMENT — HOOS JR
HOOS JR SCORE: 100
HOOS JR SCORE: 0

## 2018-12-05 NOTE — PROGRESS NOTES
[facility] Note    Subjective     Chief Complaint   Patient presents with   • Right Hip - Follow-up     1yr status post Right Total Hip Arthroplasty 12/5/17  6 month f/u        HPI    Maria Eugenia Sandoval is a 63 y.o. female.  She follows up today for her right total hip arthroplasty.  She is doing well, with 100% relief compared to her preoperative symptoms.  Fully ambulatory without external aids.  She is pleased with results.      Patient Active Problem List   Diagnosis   • Primary osteoarthritis of right hip   • Status post total replacement of right hip   • Hypothyroid   • Tobacco abuse   • Diverticulitis     Past Medical History:   Diagnosis Date   • Anxiety    • Arthritis    • Cataract    • Disease of thyroid gland    • Diverticulitis    • GERD (gastroesophageal reflux disease)    • Heart disease    • Wears contact lenses    • Wears glasses       Past Surgical History:   Procedure Laterality Date   • ANKLE OPEN REDUCTION INTERNAL FIXATION     • COLONOSCOPY     • ENDOSCOPY     • TUMOR REMOVAL        Family History   Problem Relation Age of Onset   • Osteoarthritis Mother    • Breast cancer Neg Hx    • Ovarian cancer Neg Hx      Social History     Socioeconomic History   • Marital status: Single     Spouse name: Not on file   • Number of children: Not on file   • Years of education: Not on file   • Highest education level: Not on file   Social Needs   • Financial resource strain: Not on file   • Food insecurity - worry: Not on file   • Food insecurity - inability: Not on file   • Transportation needs - medical: Not on file   • Transportation needs - non-medical: Not on file   Occupational History   • Not on file   Tobacco Use   • Smoking status: Current Every Day Smoker     Packs/day: 0.25     Years: 15.00     Pack years: 3.75     Types: Cigarettes   • Smokeless tobacco: Never Used   • Tobacco comment: down to 5 cigarettes a day    Substance and Sexual Activity   • Alcohol use: Yes      Comment: 2 glass a wine a night    • Drug use: No   • Sexual activity: Defer   Other Topics Concern   • Not on file   Social History Narrative   • Not on file      Current Outpatient Medications on File Prior to Visit   Medication Sig Dispense Refill   • aspirin 81 MG EC tablet Take 81 mg by mouth Daily.     • calcium carbonate (OS-YASMINE) 600 MG tablet Take 600 mg by mouth Daily.     • Cholecalciferol 60974 units capsule Take  by mouth 1 (One) Time Per Week.     • ibuprofen (ADVIL,MOTRIN) 200 MG tablet Take 3 tablets by mouth Every 6 (Six) Hours As Needed for Mild Pain  or Moderate Pain . Must take an hour after aspirin if needed.     • levothyroxine (SYNTHROID, LEVOTHROID) 112 MCG tablet Take 112 mcg by mouth Daily.     • neomycin (MYCIFRADIN) 500 MG tablet TK 2 TS PO AT 10 PM THE NIGHT PRIOR TO SURGERY AND 7 AM THE MORNING OF SURGERY  0   • PB-Hyoscy-Atropine-Scopolamine (DONNATAL) 16.2 MG per tablet Take 1 tablet by mouth Every 6 (Six) Hours As Needed for Heartburn.     • sennosides-docusate sodium (SENOKOT-S) 8.6-50 MG tablet Take 2 tablets by mouth 2 (Two) Times a Day. 60 tablet 0     No current facility-administered medications on file prior to visit.       No Known Allergies     Review of Systems   Constitutional: Negative for activity change, appetite change, chills, diaphoresis, fatigue, fever and unexpected weight change.   HENT: Negative for congestion, dental problem, drooling, ear discharge, ear pain, facial swelling, hearing loss, mouth sores, nosebleeds, postnasal drip, rhinorrhea, sinus pressure, sneezing, sore throat, tinnitus, trouble swallowing and voice change.    Eyes: Negative for photophobia, pain, discharge, redness, itching and visual disturbance.   Respiratory: Negative for apnea, cough, choking, chest tightness, shortness of breath, wheezing and stridor.    Cardiovascular: Negative for chest pain, palpitations and leg swelling.   Gastrointestinal: Negative for abdominal distention, abdominal  "pain, anal bleeding, blood in stool, constipation, diarrhea, nausea, rectal pain and vomiting.   Endocrine: Negative for cold intolerance, heat intolerance, polydipsia, polyphagia and polyuria.   Genitourinary: Negative for decreased urine volume, difficulty urinating, dysuria, enuresis, flank pain, frequency, genital sores, hematuria and urgency.   Musculoskeletal: Positive for joint swelling. Negative for arthralgias, back pain, gait problem, myalgias, neck pain and neck stiffness.   Skin: Negative for color change, pallor, rash and wound.   Allergic/Immunologic: Negative for environmental allergies, food allergies and immunocompromised state.   Neurological: Negative for dizziness, tremors, seizures, syncope, facial asymmetry, speech difficulty, weakness, light-headedness, numbness and headaches.   Hematological: Negative for adenopathy. Does not bruise/bleed easily.   Psychiatric/Behavioral: Negative for agitation, behavioral problems, confusion, decreased concentration, dysphoric mood, hallucinations, self-injury, sleep disturbance and suicidal ideas. The patient is not nervous/anxious and is not hyperactive.         Objective      Physical Exam  Pulse 55   Ht 175.3 cm (69.02\")   Wt 70.9 kg (156 lb 4.9 oz)   SpO2 99%   BMI 23.07 kg/m²     Body mass index is 23.07 kg/m².    General:   Mental Status:  Alert   Appearance: Cooperative, in no acute distress   Build and Nutrition: Well-nourished and well developed female   Orientation: Alert and oriented to person, place and time   Posture: Normal   Gait: Normal    Integument:   Right hip: Wound is well-healed with no signs of infection    Lower Extremity:   Right Hip:    Tenderness:  None    Swelling:  None    Crepitus:  None    Range of motion:  External Rotation: 40°       Internal Rotation: 40°       Flexion:  100°       Extension:  0°    Deformities:  None  [facility]    No leg length discrepancy      Imaging/Studies  Imaging " Results (last 24 hours)     Procedure Component Value Units Date/Time    XR Hip With or Without Pelvis 1 View Right [[id number]] Resulted:  12/05/18 1536     Updated:  12/05/18 1537    Narrative:       Right Hip Radiographs  Indication: status-post right total hip arthroplasty  Views: low AP pelvis and lateral of the right hip    Comparison: no change compared to prior study, 6/4/2018    Findings:   The components are well aligned, with no signs of loosening or failure.    Heterotopic bone is stable, which is nonbridging.            Assessment and Plan     Maria Eugenia was seen today for follow-up.    Diagnoses and all orders for this visit:    Status post total replacement of right hip  -     XR Hip With or Without Pelvis 1 View Right    Postoperative examination        I reviewed my findings with patient today.  Her right total hip arthroplasty is functioning well, and she is pleased with results.  I will see her back in 4 years with repeat x-rays, but sooner for any problems.    Return in about 4 years (around 12/5/2022) for Recheck with X-Rays.      Medical Decision Making  Data/Risk: radiology tests and independent visualization of imaging, lab tests, or EMG/NCV      Marek Talamantes MD  12/05/18  3:46 PM

## 2019-02-11 ENCOUNTER — ANESTHESIA (OUTPATIENT)
Dept: PERIOP | Facility: HOSPITAL | Age: 64
End: 2019-02-11

## 2019-02-11 ENCOUNTER — ANESTHESIA EVENT (OUTPATIENT)
Dept: PERIOP | Facility: HOSPITAL | Age: 64
End: 2019-02-11

## 2019-02-11 ENCOUNTER — HOSPITAL ENCOUNTER (INPATIENT)
Facility: HOSPITAL | Age: 64
LOS: 1 days | Discharge: HOME | End: 2019-02-12
Attending: COLON & RECTAL SURGERY | Admitting: COLON & RECTAL SURGERY

## 2019-02-11 PROBLEM — Z93.2 ILEOSTOMY PRESENT: Status: ACTIVE | Noted: 2019-02-11

## 2019-02-11 PROCEDURE — 25010000002 FENTANYL CITRATE (PF) 100 MCG/2ML SOLUTION: Performed by: NURSE ANESTHETIST, CERTIFIED REGISTERED

## 2019-02-11 PROCEDURE — 25010000002 DEXAMETHASONE PER 1 MG: Performed by: NURSE ANESTHETIST, CERTIFIED REGISTERED

## 2019-02-11 PROCEDURE — G0378 HOSPITAL OBSERVATION PER HR: HCPCS

## 2019-02-11 PROCEDURE — 25010000002 HEPARIN (PORCINE) PER 1000 UNITS: Performed by: COLON & RECTAL SURGERY

## 2019-02-11 PROCEDURE — 25010000002 PROPOFOL 10 MG/ML EMULSION: Performed by: NURSE ANESTHETIST, CERTIFIED REGISTERED

## 2019-02-11 PROCEDURE — 25010000002 NEOSTIGMINE 10 MG/10ML SOLUTION: Performed by: NURSE ANESTHETIST, CERTIFIED REGISTERED

## 2019-02-11 PROCEDURE — 25010000002 ONDANSETRON PER 1 MG: Performed by: NURSE ANESTHETIST, CERTIFIED REGISTERED

## 2019-02-11 PROCEDURE — 0DBB0ZZ EXCISION OF ILEUM, OPEN APPROACH: ICD-10-PCS | Performed by: COLON & RECTAL SURGERY

## 2019-02-11 RX ORDER — FENTANYL CITRATE 50 UG/ML
50 INJECTION, SOLUTION INTRAMUSCULAR; INTRAVENOUS
Status: DISCONTINUED | OUTPATIENT
Start: 2019-02-11 | End: 2019-02-11 | Stop reason: HOSPADM

## 2019-02-11 RX ORDER — ONDANSETRON 2 MG/ML
INJECTION INTRAMUSCULAR; INTRAVENOUS AS NEEDED
Status: DISCONTINUED | OUTPATIENT
Start: 2019-02-11 | End: 2019-02-11 | Stop reason: SURG

## 2019-02-11 RX ORDER — NEOSTIGMINE METHYLSULFATE 1 MG/ML
INJECTION, SOLUTION INTRAVENOUS AS NEEDED
Status: DISCONTINUED | OUTPATIENT
Start: 2019-02-11 | End: 2019-02-11 | Stop reason: SURG

## 2019-02-11 RX ORDER — MAGNESIUM HYDROXIDE 1200 MG/15ML
LIQUID ORAL AS NEEDED
Status: DISCONTINUED | OUTPATIENT
Start: 2019-02-11 | End: 2019-02-11 | Stop reason: HOSPADM

## 2019-02-11 RX ORDER — ONDANSETRON 2 MG/ML
4 INJECTION INTRAMUSCULAR; INTRAVENOUS EVERY 6 HOURS PRN
Status: DISCONTINUED | OUTPATIENT
Start: 2019-02-11 | End: 2019-02-12 | Stop reason: HOSPADM

## 2019-02-11 RX ORDER — FAMOTIDINE 20 MG/1
20 TABLET, FILM COATED ORAL
Status: DISCONTINUED | OUTPATIENT
Start: 2019-02-11 | End: 2019-02-11 | Stop reason: HOSPADM

## 2019-02-11 RX ORDER — SODIUM CHLORIDE 0.9 % (FLUSH) 0.9 %
3-10 SYRINGE (ML) INJECTION AS NEEDED
Status: DISCONTINUED | OUTPATIENT
Start: 2019-02-11 | End: 2019-02-11 | Stop reason: HOSPADM

## 2019-02-11 RX ORDER — DEXAMETHASONE SODIUM PHOSPHATE 4 MG/ML
INJECTION, SOLUTION INTRA-ARTICULAR; INTRALESIONAL; INTRAMUSCULAR; INTRAVENOUS; SOFT TISSUE AS NEEDED
Status: DISCONTINUED | OUTPATIENT
Start: 2019-02-11 | End: 2019-02-11 | Stop reason: SURG

## 2019-02-11 RX ORDER — GLYCOPYRROLATE 0.2 MG/ML
INJECTION INTRAMUSCULAR; INTRAVENOUS AS NEEDED
Status: DISCONTINUED | OUTPATIENT
Start: 2019-02-11 | End: 2019-02-11 | Stop reason: SURG

## 2019-02-11 RX ORDER — ROCURONIUM BROMIDE 10 MG/ML
INJECTION, SOLUTION INTRAVENOUS AS NEEDED
Status: DISCONTINUED | OUTPATIENT
Start: 2019-02-11 | End: 2019-02-11 | Stop reason: SURG

## 2019-02-11 RX ORDER — ALVIMOPAN 12 MG/1
12 CAPSULE ORAL ONCE
Status: COMPLETED | OUTPATIENT
Start: 2019-02-11 | End: 2019-02-11

## 2019-02-11 RX ORDER — SCOLOPAMINE TRANSDERMAL SYSTEM 1 MG/1
1 PATCH, EXTENDED RELEASE TRANSDERMAL CONTINUOUS
Status: DISCONTINUED | OUTPATIENT
Start: 2019-02-11 | End: 2019-02-12 | Stop reason: HOSPADM

## 2019-02-11 RX ORDER — DIAZEPAM 5 MG/1
5 TABLET ORAL EVERY 6 HOURS PRN
Status: DISCONTINUED | OUTPATIENT
Start: 2019-02-11 | End: 2019-02-12 | Stop reason: HOSPADM

## 2019-02-11 RX ORDER — MELOXICAM 7.5 MG/1
15 TABLET ORAL ONCE
Status: COMPLETED | OUTPATIENT
Start: 2019-02-11 | End: 2019-02-11

## 2019-02-11 RX ORDER — LIDOCAINE HYDROCHLORIDE 10 MG/ML
INJECTION, SOLUTION EPIDURAL; INFILTRATION; INTRACAUDAL; PERINEURAL AS NEEDED
Status: DISCONTINUED | OUTPATIENT
Start: 2019-02-11 | End: 2019-02-11 | Stop reason: SURG

## 2019-02-11 RX ORDER — BUPIVACAINE HYDROCHLORIDE AND EPINEPHRINE 2.5; 5 MG/ML; UG/ML
INJECTION, SOLUTION EPIDURAL; INFILTRATION; INTRACAUDAL; PERINEURAL AS NEEDED
Status: DISCONTINUED | OUTPATIENT
Start: 2019-02-11 | End: 2019-02-11 | Stop reason: HOSPADM

## 2019-02-11 RX ORDER — MORPHINE SULFATE 2 MG/ML
1 INJECTION, SOLUTION INTRAMUSCULAR; INTRAVENOUS
Status: DISCONTINUED | OUTPATIENT
Start: 2019-02-11 | End: 2019-02-12 | Stop reason: HOSPADM

## 2019-02-11 RX ORDER — ACETAMINOPHEN 500 MG
1000 TABLET ORAL ONCE
Status: COMPLETED | OUTPATIENT
Start: 2019-02-11 | End: 2019-02-11

## 2019-02-11 RX ORDER — PROPOFOL 10 MG/ML
VIAL (ML) INTRAVENOUS AS NEEDED
Status: DISCONTINUED | OUTPATIENT
Start: 2019-02-11 | End: 2019-02-11 | Stop reason: SURG

## 2019-02-11 RX ORDER — NICOTINE 21 MG/24HR
1 PATCH, TRANSDERMAL 24 HOURS TRANSDERMAL EVERY 24 HOURS
COMMUNITY
End: 2021-01-23

## 2019-02-11 RX ORDER — SODIUM CHLORIDE, SODIUM LACTATE, POTASSIUM CHLORIDE, CALCIUM CHLORIDE 600; 310; 30; 20 MG/100ML; MG/100ML; MG/100ML; MG/100ML
9 INJECTION, SOLUTION INTRAVENOUS CONTINUOUS PRN
Status: DISCONTINUED | OUTPATIENT
Start: 2019-02-11 | End: 2019-02-11 | Stop reason: HOSPADM

## 2019-02-11 RX ORDER — OXYCODONE HYDROCHLORIDE 5 MG/1
5 TABLET ORAL EVERY 4 HOURS PRN
Status: DISCONTINUED | OUTPATIENT
Start: 2019-02-11 | End: 2019-02-12 | Stop reason: HOSPADM

## 2019-02-11 RX ORDER — PREGABALIN 75 MG/1
75 CAPSULE ORAL ONCE
Status: COMPLETED | OUTPATIENT
Start: 2019-02-11 | End: 2019-02-11

## 2019-02-11 RX ORDER — NALOXONE HCL 0.4 MG/ML
0.4 VIAL (ML) INJECTION
Status: DISCONTINUED | OUTPATIENT
Start: 2019-02-11 | End: 2019-02-12 | Stop reason: HOSPADM

## 2019-02-11 RX ORDER — SODIUM CHLORIDE 9 MG/ML
999 INJECTION, SOLUTION INTRAVENOUS CONTINUOUS
Status: DISCONTINUED | OUTPATIENT
Start: 2019-02-11 | End: 2019-02-12 | Stop reason: HOSPADM

## 2019-02-11 RX ORDER — SODIUM CHLORIDE 0.9 % (FLUSH) 0.9 %
3 SYRINGE (ML) INJECTION EVERY 12 HOURS SCHEDULED
Status: DISCONTINUED | OUTPATIENT
Start: 2019-02-11 | End: 2019-02-11 | Stop reason: HOSPADM

## 2019-02-11 RX ORDER — ACETAMINOPHEN 500 MG
1000 TABLET ORAL EVERY 8 HOURS
Status: DISCONTINUED | OUTPATIENT
Start: 2019-02-11 | End: 2019-02-12 | Stop reason: HOSPADM

## 2019-02-11 RX ORDER — HEPARIN SODIUM 5000 [USP'U]/ML
5000 INJECTION, SOLUTION INTRAVENOUS; SUBCUTANEOUS ONCE
Status: COMPLETED | OUTPATIENT
Start: 2019-02-11 | End: 2019-02-11

## 2019-02-11 RX ORDER — LIDOCAINE HYDROCHLORIDE 10 MG/ML
0.5 INJECTION, SOLUTION EPIDURAL; INFILTRATION; INTRACAUDAL; PERINEURAL ONCE AS NEEDED
Status: COMPLETED | OUTPATIENT
Start: 2019-02-11 | End: 2019-02-11

## 2019-02-11 RX ORDER — HYDROMORPHONE HYDROCHLORIDE 1 MG/ML
0.5 INJECTION, SOLUTION INTRAMUSCULAR; INTRAVENOUS; SUBCUTANEOUS
Status: DISCONTINUED | OUTPATIENT
Start: 2019-02-11 | End: 2019-02-11 | Stop reason: HOSPADM

## 2019-02-11 RX ORDER — FENTANYL CITRATE 50 UG/ML
INJECTION, SOLUTION INTRAMUSCULAR; INTRAVENOUS AS NEEDED
Status: DISCONTINUED | OUTPATIENT
Start: 2019-02-11 | End: 2019-02-11 | Stop reason: SURG

## 2019-02-11 RX ADMIN — SCOPALAMINE 1 PATCH: 1 PATCH, EXTENDED RELEASE TRANSDERMAL at 11:37

## 2019-02-11 RX ADMIN — HEPARIN SODIUM 5000 UNITS: 5000 INJECTION, SOLUTION INTRAVENOUS; SUBCUTANEOUS at 11:37

## 2019-02-11 RX ADMIN — FENTANYL CITRATE 50 MCG: 50 INJECTION INTRAMUSCULAR; INTRAVENOUS at 14:45

## 2019-02-11 RX ADMIN — FAMOTIDINE 20 MG: 20 TABLET, FILM COATED ORAL at 11:37

## 2019-02-11 RX ADMIN — NEOSTIGMINE METHYLSULFATE 3 MG: 1 INJECTION, SOLUTION INTRAVENOUS at 14:18

## 2019-02-11 RX ADMIN — OXYCODONE HYDROCHLORIDE 5 MG: 5 TABLET ORAL at 17:08

## 2019-02-11 RX ADMIN — FENTANYL CITRATE 50 MCG: 50 INJECTION, SOLUTION INTRAMUSCULAR; INTRAVENOUS at 13:42

## 2019-02-11 RX ADMIN — FENTANYL CITRATE 50 MCG: 50 INJECTION, SOLUTION INTRAMUSCULAR; INTRAVENOUS at 13:38

## 2019-02-11 RX ADMIN — PROPOFOL 200 MG: 10 INJECTION, EMULSION INTRAVENOUS at 13:38

## 2019-02-11 RX ADMIN — SODIUM CHLORIDE 999 ML/HR: 9 INJECTION, SOLUTION INTRAVENOUS at 12:02

## 2019-02-11 RX ADMIN — PREGABALIN 75 MG: 75 CAPSULE ORAL at 11:37

## 2019-02-11 RX ADMIN — SODIUM CHLORIDE, POTASSIUM CHLORIDE, SODIUM LACTATE AND CALCIUM CHLORIDE: 600; 310; 30; 20 INJECTION, SOLUTION INTRAVENOUS at 13:42

## 2019-02-11 RX ADMIN — DEXAMETHASONE SODIUM PHOSPHATE 8 MG: 4 INJECTION, SOLUTION INTRAMUSCULAR; INTRAVENOUS at 13:42

## 2019-02-11 RX ADMIN — ACETAMINOPHEN 1000 MG: 500 TABLET ORAL at 11:37

## 2019-02-11 RX ADMIN — GLYCOPYRROLATE 0.4 MG: 0.2 INJECTION, SOLUTION INTRAMUSCULAR; INTRAVENOUS at 14:18

## 2019-02-11 RX ADMIN — ONDANSETRON 4 MG: 2 INJECTION INTRAMUSCULAR; INTRAVENOUS at 14:10

## 2019-02-11 RX ADMIN — LIDOCAINE HYDROCHLORIDE 50 MG: 10 INJECTION, SOLUTION EPIDURAL; INFILTRATION; INTRACAUDAL; PERINEURAL at 13:38

## 2019-02-11 RX ADMIN — LIDOCAINE HYDROCHLORIDE 0.3 ML: 10 INJECTION, SOLUTION EPIDURAL; INFILTRATION; INTRACAUDAL; PERINEURAL at 11:37

## 2019-02-11 RX ADMIN — SODIUM CHLORIDE, POTASSIUM CHLORIDE, SODIUM LACTATE AND CALCIUM CHLORIDE 9 ML/HR: 600; 310; 30; 20 INJECTION, SOLUTION INTRAVENOUS at 12:35

## 2019-02-11 RX ADMIN — EPHEDRINE SULFATE 10 MG: 50 INJECTION INTRAMUSCULAR; INTRAVENOUS; SUBCUTANEOUS at 14:09

## 2019-02-11 RX ADMIN — ALVIMOPAN 12 MG: 12 CAPSULE ORAL at 11:37

## 2019-02-11 RX ADMIN — ROCURONIUM BROMIDE 25 MG: 10 SOLUTION INTRAVENOUS at 13:38

## 2019-02-11 RX ADMIN — MELOXICAM 15 MG: 7.5 TABLET ORAL at 11:37

## 2019-02-11 RX ADMIN — ACETAMINOPHEN 1000 MG: 500 TABLET, FILM COATED ORAL at 20:06

## 2019-02-11 NOTE — ANESTHESIA POSTPROCEDURE EVALUATION
"Patient: Maria Eugenia Sandoval    Procedure Summary     Date:  02/11/19 [facility] / [facility]:   [facility]    Anesthesia Start:  1331 Anesthesia Stop:      Procedure:  LOOP ILEOSTOMY TAKEDOWN (N/A ) Diagnosis:      Surgeon:  Shant Lerner MD Provider:  Anderson Seals MD    Anesthesia Type:  general with block ASA Status:  3          Anesthesia Type: general with block  Last vitals  BP   141/67 (02/11/19 1128)   Temp   97.5 °F (36.4 °C) (02/11/19 1128)   Pulse   73 (02/11/19 1128)   Resp   18 (02/11/19 1128)     SpO2   97 % (02/11/19 1128)     Post Anesthesia Care and Evaluation    Patient location during evaluation: PACU  Patient participation: complete - patient participated  Level of consciousness: awake and responsive to verbal stimuli  Pain score: 2  Pain management: adequate  Airway patency: patent  Anesthetic complications: No anesthetic complications    Cardiovascular status: acceptable  Respiratory status: acceptable  Hydration status: acceptable    Comments: Pt awake and responsive. SV. VSS. Report to RN. Patient Vitals in the past 24 hrs:  02/11/19 1128, BP:141/67, Temp:97.5 °F (36.4 °C), Temp src:Temporal, Pulse:73, Resp:18, SpO2:97 %, Height:175.3 cm (69\"), Weight:66.7 kg (147 lb)  133/78. p 72. r 16. t 98.1                  "

## 2019-02-11 NOTE — ANESTHESIA POSTPROCEDURE EVALUATION
"Patient: Maria Eugenia Sandoval    Procedure Summary     Date:  02/11/19 [facility]:   [facility]    Anesthesia Start:  1331 Anesthesia Stop:      Procedure:  LOOP ILEOSTOMY TAKEDOWN (N/A ) Diagnosis:      Surgeon:  Shant eLrner MD Provider:  Anderson Seals MD    Anesthesia Type:  general with block ASA Status:  3          Anesthesia Type: general with block  Last vitals  BP   116/89   Temp   97.8   Pulse   89   Resp   18     SpO2   98     Post Anesthesia Care and Evaluation    Patient location during evaluation: PACU  Patient participation: complete - patient participated  Level of consciousness: awake and responsive to verbal stimuli  Pain score: 2  Pain management: adequate  Airway patency: patent  Anesthetic complications: No anesthetic complications    Cardiovascular status: acceptable  Respiratory status: acceptable  Hydration status: acceptable    Comments: Pt awake and responsive. SV. VSS. Report to RN. Patient Vitals in the past 24 hrs:  02/11/19 1128, BP:141/67, Temp:97.5 °F (36.4 °C), Temp src:Temporal, Pulse:73, Resp:18, SpO2:97 %, Height:175.3 cm (69\"), Weight:66.7 kg (147 lb)  133/78. p 72. r 16. t 98.1                  "

## 2019-02-11 NOTE — PLAN OF CARE
Problem: Patient Care Overview  Goal: Plan of Care Review  Outcome: Ongoing (interventions implemented as appropriate)   02/11/19 4406   Coping/Psychosocial   Plan of Care Reviewed With patient;family   Plan of Care Review   Progress no change   OTHER   Outcome Summary Tx from surgery, VSS, ambulated. Refuses pain PRN meds at this time.      Goal: Individualization and Mutuality  Outcome: Ongoing (interventions implemented as appropriate)    Goal: Discharge Needs Assessment  Outcome: Ongoing (interventions implemented as appropriate)

## 2019-02-11 NOTE — H&P
Pre-Op H&P  Maria Eugenia Sandoval  [id number]  1955    Chief complaint: Diverticulitis    HPI:    Patient is a 63 y.o.female who presents with diverticulitis s/p SIGMOID COLECTOMY, APPENDECTOMY, TAKEDOWN OF SPLENIC FEXURE,PROCTOSCOPY, UMBILICAL HERNIA REPAIR, DEBRIDEMENT & CULTURE OF PELVIC ABCESS,DIVERTING LOOP ILEOSTOMY, RIGHT URETEROLYSIS, BILATERAL SALPINGO OOPHORECTOMY 9/10/2018.  Cultures revealing Cutibacterium acnes.  Here today for loop ileostomy takedown     Review of Systems:  General ROS: negative for chills, fever or skin lesions;  No changes since last office visit  Cardiovascular ROS: no chest pain or dyspnea on exertion.  Pt denies heart disease.  Fall 2018 negative cardiac workup in [facility] for chest pain.    Respiratory ROS: no cough, shortness of breath, or wheezing    Allergies: No Known Allergies    Home Meds:    No current facility-administered medications on file prior to encounter.      Current Outpatient Medications on File Prior to Encounter   Medication Sig Dispense Refill   • aspirin 81 MG EC tablet Take 81 mg by mouth Daily.     • calcium carbonate (OS-YASMINE) 600 MG tablet Take 600 mg by mouth Daily.     • levothyroxine (SYNTHROID, LEVOTHROID) 112 MCG tablet Take 112 mcg by mouth Daily.     • nicotine (NICODERM CQ) 21 MG/24HR patch Place 1 patch on the skin as directed by provider Daily.     • Cholecalciferol 39269 units capsule Take  by mouth 1 (One) Time Per Week.     • ibuprofen (ADVIL,MOTRIN) 200 MG tablet Take 3 tablets by mouth Every 6 (Six) Hours As Needed for Mild Pain  or Moderate Pain . Must take an hour after aspirin if needed.     • [DISCONTINUED] neomycin (MYCIFRADIN) 500 MG tablet TK 2 TS PO AT 10 PM THE NIGHT PRIOR TO SURGERY AND 7 AM THE MORNING OF SURGERY  0   • [DISCONTINUED] PB-Hyoscy-Atropine-Scopolamine (DONNATAL) 16.2 MG per tablet Take 1 tablet by mouth Every 6 (Six) Hours As Needed for Heartburn.     • [DISCONTINUED] sennosides-docusate sodium (SENOKOT-S) 8.6-50 MG  "tablet Take 2 tablets by mouth 2 (Two) Times a Day. 60 tablet 0       PMH:   Past Medical History:   Diagnosis Date   • Anxiety    • Arthritis    • Cataract    • Disease of thyroid gland    • Diverticulitis    • GERD (gastroesophageal reflux disease)    • Heart disease    • Wears contact lenses    • Wears glasses      PSH:    Past Surgical History:   Procedure Laterality Date   • ANKLE OPEN REDUCTION INTERNAL FIXATION     • COLON RESECTION N/A 9/10/2018    Procedure: SIGMOID COLECTOMY, APPENDECTOMY, TAKEDOWN OF SPLENIC FEXURE,PROCTOSCOPY, UMBILICAL HERNIA REPAIR, DEBRIDEMENT & CULTURE OF PELVIC ABCESS,DIVERTING LOOP ILEOSTOMY, RIGHT URETEROLYSIS, BILATERAL SALPINGO OOPHORECTOMY;  Surgeon: Shant Lerner MD;  Location:  [facility];  Service: General   • COLONOSCOPY     • ENDOSCOPY     • TOTAL HIP ARTHROPLASTY Right 12/5/2017    Procedure: TOTAL HIP ARTHROPLASTY RIGHT;  Surgeon: Marek Talamantes MD;  Location:  [facility];  Service:    • TUMOR REMOVAL       Social History:   Tobacco:   Social History     Tobacco Use   Smoking Status Current Every Day Smoker   • Packs/day: 0.25   • Years: 15.00   • Pack years: 3.75   • Types: Cigarettes   Smokeless Tobacco Never Used   Tobacco Comment    down to 5 cigarettes a day       Alcohol:     Social History     Substance and Sexual Activity   Alcohol Use Yes    Comment: 2 glass a wine a night        Vitals:           /67 (BP Location: Right arm, Patient Position: Lying)   Pulse 73   Temp 97.5 °F (36.4 °C) (Temporal)   Resp 18   Ht 175.3 cm (69\")   Wt 66.7 kg (147 lb)   SpO2 97%   BMI 21.71 kg/m²     Physical Exam:  General Appearance:    Alert, cooperative, no distress, appears stated age   Head:    Normocephalic, without obvious abnormality, atraumatic   Lungs:     Clear to auscultation bilaterally, respirations unlabored    Heart:   Regular rate and rhythm, S1 and S2 normal, no murmur, rub    or gallop    Abdomen:    Soft, non-tender.  +bowel sounds.  +ostomy "   Breast Exam:    deferred   Genitalia:    deferred   Extremities:   Extremities normal, atraumatic, no cyanosis or edema   Skin:   Skin color, texture, turgor normal, no rashes or lesions   Neurologic:   Grossly intact   Results Review  I reviewed the patient's new clinical results.    Cancer Staging (if applicable)  Cancer Patient: __ yes x__no __unknown; If yes, clinical stage T:__ N:__M:__, stage group or __N/A    Impression: Diverticulitis s/p SIGMOID COLECTOMY, APPENDECTOMY, TAKEDOWN OF SPLENIC FEXURE,PROCTOSCOPY, UMBILICAL HERNIA REPAIR, DEBRIDEMENT & CULTURE OF PELVIC ABCESS,DIVERTING LOOP ILEOSTOMY, RIGHT URETEROLYSIS, BILATERAL SALPINGO OOPHORECTOMY 9/10/2018    Plan: Loop ileostomy takedown    Devorah Bhakta, APRMONA   2/11/2019   12:31 PM

## 2019-02-11 NOTE — ANESTHESIA PREPROCEDURE EVALUATION
Anesthesia Evaluation     Patient summary reviewed and Nursing notes reviewed   no history of anesthetic complications:  NPO Solid Status: > 8 hours  NPO Liquid Status: > 2 hours           Airway   Mallampati: III  TM distance: >3 FB  Neck ROM: full  Possible difficult intubation  Dental - normal exam     Pulmonary    (+) a smoker Current Abstained day of surgery, COPD moderate, decreased breath sounds,   Cardiovascular   Exercise tolerance: good (4-7 METS)    Rhythm: regular  Rate: normal        Neuro/Psych  (+) psychiatric history Anxiety,     GI/Hepatic/Renal/Endo    (+)  GERD well controlled,  hypothyroidism,     Musculoskeletal     Abdominal   (-) obese    Abdomen: soft.   Substance History      OB/GYN          Other   (+) arthritis                     Anesthesia Plan    ASA 3     general with block     intravenous induction   Anesthetic plan, all risks, benefits, and alternatives have been provided, discussed and informed consent has been obtained with: patient.    Plan discussed with CRNA.

## 2019-02-11 NOTE — OP NOTE
Colon and Rectal [[facility]]    ILEOSTOMY TAKEDOWN  Procedure Note    Maria Eugenia Sandoval  2/11/2019    Pre-op Diagnosis:   Loop ileostomy status post diverticular abscess    Post-op Diagnosis:     Same    Procedure(s):  LOOP ILEOSTOMY TAKEDOWN    Surgeon(s):  Shant Lerner MD    Anesthesia: General    Staff:   Circulator: Anusha Rosen, THIERRY; Jeannette Hurley RN  Scrub Person: Susan Hall  Assistant: Pati Burgess PA-C  Other: Colt Law RN    Estimated Blood Loss: Minimal  Specimens:                * No orders in the log *      Drains: Telfa  Ileostomy Loop RLQ (Active)       Findings: The patient was taken to the operating room and placed under general anesthesia in the supine position.  Her appliance was removed from the right upper quadrant ileostomy.  The abdomen was prepped and draped properly.  After a timeout, lidocaine and epinephrine was injected about the ileostomy and then an elliptical incision was made about it and dissected down to the fascia.  There is a small hernia sac in the area that I dissected out with it at the fascia.  I then dissected all the scar off the loop including the skin and freed up the mucosal edges.  I lined the edges back up and I did a 2 layer running closure with 3-0 Vicryl.  I checked it for leak and it was intact.  I irrigated well and put it back into the abdominal cavity.  I injected the fascia with local and then closed it with a running #1 PDS.  The subcutaneous tissues irrigated with saline and skin closed staples and 2 piece of Telfa were cut off and put between staples.  Blood loss was minimal.  The patient tolerated the procedure well and was taken to the recovery room in satisfactory condition.    Complications: 0    Shant Lerner MD     Date: 2/11/2019  Time: 2:45 PM

## 2019-02-11 NOTE — ANESTHESIA PROCEDURE NOTES
Airway  Urgency: elective    Airway not difficult    General Information and Staff    Patient location during procedure: OR  CRNA: Shaw Cummings CRNA    Indications and Patient Condition  Indications for airway management: airway protection    Preoxygenated: yes  MILS not maintained throughout  Mask difficulty assessment: 1 - vent by mask    Final Airway Details  Final airway type: endotracheal airway      Successful airway: ETT  Cuffed: yes   Successful intubation technique: direct laryngoscopy  Facilitating devices/methods: intubating stylet  Endotracheal tube insertion site: oral  Blade: Humza  Blade size: 3  ETT size (mm): 7.5  Cormack-Lehane Classification: grade I - full view of glottis  Placement verified by: chest auscultation and capnometry   Measured from: lips  ETT to lips (cm): 20  Number of attempts at approach: 1    Additional Comments  Negative epigastric sounds, Breath sound equal bilaterally with symmetric chest rise and fall

## 2019-02-12 VITALS
TEMPERATURE: 97.1 F | SYSTOLIC BLOOD PRESSURE: 91 MMHG | WEIGHT: 147 LBS | RESPIRATION RATE: 18 BRPM | DIASTOLIC BLOOD PRESSURE: 65 MMHG | HEIGHT: 69 IN | HEART RATE: 59 BPM | BODY MASS INDEX: 21.77 KG/M2 | OXYGEN SATURATION: 97 %

## 2019-02-12 PROBLEM — Z93.2 ILEOSTOMY PRESENT (HCC): Status: RESOLVED | Noted: 2019-02-11 | Resolved: 2019-02-12

## 2019-02-12 PROBLEM — K57.92 DIVERTICULITIS: Status: RESOLVED | Noted: 2018-09-10 | Resolved: 2019-02-12

## 2019-02-12 PROCEDURE — 25010000002 ENOXAPARIN PER 10 MG: Performed by: COLON & RECTAL SURGERY

## 2019-02-12 RX ORDER — OXYCODONE HYDROCHLORIDE 5 MG/1
5 TABLET ORAL EVERY 4 HOURS PRN
Qty: 20 TABLET | Refills: 0 | Status: SHIPPED | OUTPATIENT
Start: 2019-02-12 | End: 2019-02-21

## 2019-02-12 RX ADMIN — ENOXAPARIN SODIUM 40 MG: 40 INJECTION SUBCUTANEOUS at 09:05

## 2019-02-12 RX ADMIN — ACETAMINOPHEN 1000 MG: 500 TABLET, FILM COATED ORAL at 03:44

## 2019-02-12 RX ADMIN — OXYCODONE HYDROCHLORIDE 5 MG: 5 TABLET ORAL at 00:23

## 2019-02-12 RX ADMIN — ACETAMINOPHEN 1000 MG: 500 TABLET, FILM COATED ORAL at 12:26

## 2019-02-12 RX ADMIN — OXYCODONE HYDROCHLORIDE 5 MG: 5 TABLET ORAL at 16:50

## 2019-02-12 RX ADMIN — OXYCODONE HYDROCHLORIDE 5 MG: 5 TABLET ORAL at 09:05

## 2019-02-12 NOTE — PROGRESS NOTES
Clinical Nutrition   Reason For Visit: Identified at risk by screening criteria, MST score 2+    Patient Name: Maria Eugenia Sandoval  YOB: 1955  MRN: [id number]  Date of Encounter: 02/12/19 12:22 PM  Admission date: 2/11/2019      RD will assess weight loss amount at present after standing weight obtained during this admission.  Patient reports she is tolerating full liquid diet at this time.  Declines oral nutrition supplements.      Nutrition Assessment     Admission Problem List:  Ileostomy      Applicable Nutrition-Related Information:  H/o diverticulitis s/p sigmoid colectomy, appendectomy, takedown of splenic flexture, proctoscopy, umbilical hernia repair, debridement and culture of pelvic abscess, diverting loop ileostomy, right ureterolysis, bilateral salpingo oophorectomy (9/10/2018)      Applicable medical tests/procedures since admission:  (2/11) s/p ileostomy takedown      PMH: She  has a past medical history of Anxiety, Arthritis, Cataract, Disease of thyroid gland, Diverticulitis, GERD (gastroesophageal reflux disease), Heart disease, Wears contact lenses, and Wears glasses.   PSxH: She  has a past surgical history that includes Ankle fracture surgery; Tumor removal; Colonoscopy; Esophagogastroduodenoscopy; LOOP ILEOSTOMY TAKEDOWN (N/A, 2/11/2019); SIGMOID COLECTOMY, APPENDECTOMY, TAKEDOWN OF SPLENIC FEXURE,PROCTOSCOPY, UMBILICAL HERNIA REPAIR, DEBRIDEMENT & CULTURE OF PELVIC ABCESS,DIVERTING LOOP ILEOSTOMY, RIGHT URETEROLYSIS, BILATERAL SALPINGO OOPHORECTOMY (N/A, 9/10/2018); and TOTAL HIP ARTHROPLASTY RIGHT (Right, 12/5/2017).        Reported/Observed/Food/Nutrition Related History   Patient and sister present at time of visit. Patient reports since her surgery 9/10/2018 she has been eating <50% of her usual PO intake due to decreased appetite and early satiety. As a result she has lost weight but is unsure of exact amount at this time. 3 weeks at at MD office visit she weighed 147 lbs. Denies  food allergies. Tolerating full liquid diet, but appetite not yet normal. Declines oral nutrition supplements at this time.  Reports +flatus but no bowel movement yet. Willing to stand for standing scale weight.      Anthropometrics   Height: 69 in  Weight: 147 lbs (stated weight 2/11 per nsg doc) - pt states this was standing wt at MD office visit 3 wks ago  BMI: 21.7  BMI classification: Normal: 18.5-24.9kg/m2   UBW: 166 lbs (standing weight 9/5/18 per EMR)  IBW: 160 lbs    Weight change: Based on EMR and reported weight hx, patient has unintentionally lost 19 lbs (11.4%) x 3.5-4 months as of 3 weeks ago; asked RN to obtain current standing weight to assess if patient has lost any additional weight within the past 3 weeks      Labs reviewed   Labs reviewed: Yes    Medications reviewed   Medications reviewed: Yes    Current Nutrition Prescription   PO: Diet Full Liquid (crackers and toast allowed)    Evaluation of Received Nutrient/Fluid Intake: 25% / 1 meal      Nutrition Diagnosis     Problem Unintended weight loss   Etiology Decreased appetite/early satiety   Signs/Symptoms <50% of usual PO intake x 3.5-4 months; unintentional weight loss of 19 lbs (11.4%) x 3.5-4 months       Problem Inadequate oral intake   Etiology Decreased appetite, current diet order   Signs/Symptoms PO intake: 25% / 1 meal (full liquid diet)       Intervention   Intervention: Follow treatment progress, Care plan reviewed, Interview for preferences, Encourage intake, Supplement offered/refused     Verbally ordered standing weight from RN      Goal:   General: Nutrition support treatment  PO: Increase intake, Advace diet as medically appropriate    Additional goals: No further weight loss      Monitoring/Evaluation:       Monitoring/Evaluation: Per protocol, PO intake, Weight  Will Continue to follow per protocol  Qi Michelle, SOFIA  Time Spent: 35 min

## 2019-02-12 NOTE — PLAN OF CARE
Problem: Patient Care Overview  Goal: Plan of Care Review  Outcome: Ongoing (interventions implemented as appropriate)   02/12/19 0565   Plan of Care Review   Progress no change   OTHER   Outcome Summary PRN pain med x 1 with relief. denies other complaints. ambulated in room without issue. tolerating diet. bowel sounds present. Has not passed gas or stool yet. Will continue to monitor.

## 2019-02-12 NOTE — PROGRESS NOTES
Discharge Planning Assessment  [facility]     Patient Name: Maria Eugenia Sandoval  MRN: [id number]  Today's Date: 2/12/2019    Admit Date: 2/11/2019    Discharge Needs Assessment     Row Name 02/12/19 0953       Living Environment    Lives With  child(joan), adult    Current Living Arrangements  home/apartment/condo    Living Arrangement Comments  Her son lives with her.       Discharge Needs Assessment    Equipment Currently Used at Home  none    Equipment Needed After Discharge  none    Discharge Coordination/Progress  No HH involved.        Discharge Plan     Row Name 02/12/19 0956       Plan    Plan  Home at DC    Patient/Family in Agreement with Plan  yes    Plan Comments  I spoke with the pt. She has no DC needs.    Row Name 02/12/19 0852       Plan    Final Discharge Disposition Code  01 - home or self-care        Destination      No service coordination in this encounter.      Durable Medical Equipment      No service coordination in this encounter.      Dialysis/Infusion      No service coordination in this encounter.      Home Medical Care      No service coordination in this encounter.      Community Resources      No service coordination in this encounter.        Expected Discharge Date and Time     Expected Discharge Date Expected Discharge Time    Feb 13, 2019         Demographic Summary    No documentation.       Functional Status     Row Name 02/12/19 0953       Functional Status    Usual Activity Tolerance  good       Functional Status, IADL    Medications  independent    Meal Preparation  independent    Housekeeping  independent    Laundry  independent    Shopping  independent        Psychosocial    No documentation.       Abuse/Neglect    No documentation.       Legal    No documentation.       Substance Abuse    No documentation.       Patient Forms    No documentation.           Arelis Easley RN

## 2019-02-15 NOTE — DISCHARGE SUMMARY
"Colon and Rectal [[facility]]    Date of Discharge:  2/14/2019    Discharge Diagnosis: Loop ileostomy status post a diverticular abscess    Problem List:    * No active hospital problems. *      Presenting Problem/History of Present Illness  Ileostomy present (CMS/[facility]) [Z93.2]  Ileostomy present (CMS/[facility]) [Z93.2]      Hospital Course  Patient is a 63 y.o. 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  She has done well and came in for loop ileostomy takedown that was carried out on the day of admission.  Her postop course is been uneventful and she is tolerating food and passing gas.  Her wound looks fine and one of the drains was removed.  Home today and follow-up in one week.  The second drain tomorrow.      Procedures Performed  Procedure(s):  LOOP ILEOSTOMY TAKEDOWN       Consults:   Consults     No orders found from 1/13/2019 to 2/12/2019.            Condition on Discharge:  Improved    Vital Signs  Blood pressure 91/65, pulse 59, temperature 97.1 °F (36.2 °C), temperature source Oral, resp. rate 18, height 175.3 cm (69\"), weight 66.7 kg (147 lb), SpO2 97 %.    Discharge Disposition  Home or Self Care    Discharge Medications     Discharge Medications      New Medications      Instructions Start Date   oxyCODONE 5 MG immediate release tablet  Commonly known as:  ROXICODONE   5 mg, Oral, Every 4 Hours PRN         Continue These Medications      Instructions Start Date   aspirin 81 MG EC tablet   81 mg, Oral, Daily      calcium carbonate 600 MG tablet  Commonly known as:  OS-YASMINE   600 mg, Oral, Daily      Cholecalciferol 34092 units capsule   Oral, Weekly      ibuprofen 200 MG tablet  Commonly known as:  ADVIL,MOTRIN   600 mg, Oral, Every 6 Hours PRN, Must take an hour after aspirin if needed.      levothyroxine 112 MCG tablet  Commonly known as:  SYNTHROID, LEVOTHROID   112 mcg, Oral, Daily      nicotine 21 MG/24HR patch  Commonly known as:  NICODERM CQ   1 patch, " Transdermal, Every 24 Hours             Discharge Diet as tolerated      Activity at Discharge as tolerated      Follow-up Appointments 1 week  No future appointments.      Test Results Pending at Discharge none

## 2019-05-20 ENCOUNTER — TRANSCRIBE ORDERS (OUTPATIENT)
Dept: ADMINISTRATIVE | Facility: HOSPITAL | Age: 64
End: 2019-05-20

## 2019-05-20 DIAGNOSIS — Z12.31 VISIT FOR SCREENING MAMMOGRAM: Primary | ICD-10-CM

## 2019-05-22 ENCOUNTER — HOSPITAL ENCOUNTER (OUTPATIENT)
Dept: MAMMOGRAPHY | Facility: HOSPITAL | Age: 64
Discharge: HOME | End: 2019-05-22
Admitting: OBSTETRICS & GYNECOLOGY

## 2019-05-22 DIAGNOSIS — Z12.31 VISIT FOR SCREENING MAMMOGRAM: ICD-10-CM

## 2019-05-22 PROCEDURE — 77067 SCR MAMMO BI INCL CAD: CPT

## 2019-05-22 PROCEDURE — 77067 SCR MAMMO BI INCL CAD: CPT | Performed by: RADIOLOGY

## 2019-05-22 PROCEDURE — 77063 BREAST TOMOSYNTHESIS BI: CPT | Performed by: RADIOLOGY

## 2019-05-22 PROCEDURE — 77063 BREAST TOMOSYNTHESIS BI: CPT

## 2020-10-29 ENCOUNTER — TRANSCRIBE ORDERS (OUTPATIENT)
Dept: ADMINISTRATIVE | Facility: HOSPITAL | Age: 65
End: 2020-10-29

## 2020-10-29 DIAGNOSIS — Z12.31 VISIT FOR SCREENING MAMMOGRAM: Primary | ICD-10-CM

## 2021-01-23 ENCOUNTER — HOSPITAL ENCOUNTER (EMERGENCY)
Facility: HOSPITAL | Age: 66
Discharge: HOME | End: 2021-01-23
Attending: EMERGENCY MEDICINE | Admitting: EMERGENCY MEDICINE

## 2021-01-23 ENCOUNTER — APPOINTMENT (OUTPATIENT)
Dept: GENERAL RADIOLOGY | Facility: HOSPITAL | Age: 66
End: 2021-01-23

## 2021-01-23 ENCOUNTER — APPOINTMENT (OUTPATIENT)
Dept: MRI IMAGING | Facility: HOSPITAL | Age: 66
End: 2021-01-23

## 2021-01-23 VITALS
HEIGHT: 63 IN | DIASTOLIC BLOOD PRESSURE: 84 MMHG | TEMPERATURE: 98.7 F | BODY MASS INDEX: 20.38 KG/M2 | OXYGEN SATURATION: 96 % | RESPIRATION RATE: 18 BRPM | WEIGHT: 115 LBS | HEART RATE: 64 BPM | SYSTOLIC BLOOD PRESSURE: 152 MMHG

## 2021-01-23 DIAGNOSIS — H81.10 BENIGN PAROXYSMAL POSITIONAL VERTIGO, UNSPECIFIED LATERALITY: Primary | ICD-10-CM

## 2021-01-23 LAB
ALBUMIN SERPL-MCNC: 4.2 G/DL (ref 3.5–5.2)
ALBUMIN/GLOB SERPL: 1.4 G/DL
ALP SERPL-CCNC: 78 U/L (ref 39–117)
ALT SERPL W P-5'-P-CCNC: 13 U/L (ref 1–33)
ANION GAP SERPL CALCULATED.3IONS-SCNC: 12 MMOL/L (ref 5–15)
AST SERPL-CCNC: 17 U/L (ref 1–32)
BASOPHILS # BLD AUTO: 0.07 10*3/MM3 (ref 0–0.2)
BASOPHILS NFR BLD AUTO: 0.8 % (ref 0–1.5)
BILIRUB SERPL-MCNC: 0.6 MG/DL (ref 0–1.2)
BILIRUB UR QL STRIP: NEGATIVE
BUN SERPL-MCNC: 18 MG/DL (ref 8–23)
BUN/CREAT SERPL: 30 (ref 7–25)
CALCIUM SPEC-SCNC: 9.6 MG/DL (ref 8.6–10.5)
CHLORIDE SERPL-SCNC: 102 MMOL/L (ref 98–107)
CLARITY UR: CLEAR
CO2 SERPL-SCNC: 25 MMOL/L (ref 22–29)
COLOR UR: YELLOW
CREAT SERPL-MCNC: 0.6 MG/DL (ref 0.57–1)
DEPRECATED RDW RBC AUTO: 46.1 FL (ref 37–54)
EOSINOPHIL # BLD AUTO: 0.11 10*3/MM3 (ref 0–0.4)
EOSINOPHIL NFR BLD AUTO: 1.3 % (ref 0.3–6.2)
ERYTHROCYTE [DISTWIDTH] IN BLOOD BY AUTOMATED COUNT: 11.9 % (ref 12.3–15.4)
GFR SERPL CREATININE-BSD FRML MDRD: 101 ML/MIN/1.73
GLOBULIN UR ELPH-MCNC: 3 GM/DL
GLUCOSE SERPL-MCNC: 105 MG/DL (ref 65–99)
GLUCOSE UR STRIP-MCNC: NEGATIVE MG/DL
HCT VFR BLD AUTO: 43 % (ref 34–46.6)
HGB BLD-MCNC: 14.3 G/DL (ref 12–15.9)
HGB UR QL STRIP.AUTO: NEGATIVE
HOLD SPECIMEN: NORMAL
HOLD SPECIMEN: NORMAL
IMM GRANULOCYTES # BLD AUTO: 0.04 10*3/MM3 (ref 0–0.05)
IMM GRANULOCYTES NFR BLD AUTO: 0.5 % (ref 0–0.5)
KETONES UR QL STRIP: NEGATIVE
LEUKOCYTE ESTERASE UR QL STRIP.AUTO: NEGATIVE
LYMPHOCYTES # BLD AUTO: 2.63 10*3/MM3 (ref 0.7–3.1)
LYMPHOCYTES NFR BLD AUTO: 30.4 % (ref 19.6–45.3)
MAGNESIUM SERPL-MCNC: 2.1 MG/DL (ref 1.6–2.4)
MCH RBC QN AUTO: 34.8 PG (ref 26.6–33)
MCHC RBC AUTO-ENTMCNC: 33.3 G/DL (ref 31.5–35.7)
MCV RBC AUTO: 104.6 FL (ref 79–97)
MONOCYTES # BLD AUTO: 0.69 10*3/MM3 (ref 0.1–0.9)
MONOCYTES NFR BLD AUTO: 8 % (ref 5–12)
NEUTROPHILS NFR BLD AUTO: 5.1 10*3/MM3 (ref 1.7–7)
NEUTROPHILS NFR BLD AUTO: 59 % (ref 42.7–76)
NITRITE UR QL STRIP: NEGATIVE
NRBC BLD AUTO-RTO: 0 /100 WBC (ref 0–0.2)
PH UR STRIP.AUTO: 6 [PH] (ref 5–8)
PLATELET # BLD AUTO: 192 10*3/MM3 (ref 140–450)
PMV BLD AUTO: 11.2 FL (ref 6–12)
POTASSIUM SERPL-SCNC: 3.8 MMOL/L (ref 3.5–5.2)
PROT SERPL-MCNC: 7.2 G/DL (ref 6–8.5)
PROT UR QL STRIP: NEGATIVE
QT INTERVAL: 392 MS
QTC INTERVAL: 437 MS
RBC # BLD AUTO: 4.11 10*6/MM3 (ref 3.77–5.28)
SODIUM SERPL-SCNC: 139 MMOL/L (ref 136–145)
SP GR UR STRIP: <=1.005 (ref 1–1.03)
TROPONIN T SERPL-MCNC: <0.01 NG/ML (ref 0–0.03)
UROBILINOGEN UR QL STRIP: NORMAL
WBC # BLD AUTO: 8.64 10*3/MM3 (ref 3.4–10.8)
WHOLE BLOOD HOLD SPECIMEN: NORMAL
WHOLE BLOOD HOLD SPECIMEN: NORMAL

## 2021-01-23 PROCEDURE — 85025 COMPLETE CBC W/AUTO DIFF WBC: CPT | Performed by: EMERGENCY MEDICINE

## 2021-01-23 PROCEDURE — 93005 ELECTROCARDIOGRAM TRACING: CPT | Performed by: EMERGENCY MEDICINE

## 2021-01-23 PROCEDURE — 70551 MRI BRAIN STEM W/O DYE: CPT

## 2021-01-23 PROCEDURE — 99284 EMERGENCY DEPT VISIT MOD MDM: CPT

## 2021-01-23 PROCEDURE — 83735 ASSAY OF MAGNESIUM: CPT | Performed by: EMERGENCY MEDICINE

## 2021-01-23 PROCEDURE — 84484 ASSAY OF TROPONIN QUANT: CPT | Performed by: EMERGENCY MEDICINE

## 2021-01-23 PROCEDURE — 81003 URINALYSIS AUTO W/O SCOPE: CPT | Performed by: EMERGENCY MEDICINE

## 2021-01-23 PROCEDURE — 71045 X-RAY EXAM CHEST 1 VIEW: CPT

## 2021-01-23 PROCEDURE — 80053 COMPREHEN METABOLIC PANEL: CPT | Performed by: EMERGENCY MEDICINE

## 2021-01-23 RX ORDER — MECLIZINE HYDROCHLORIDE 25 MG/1
25 TABLET ORAL 3 TIMES DAILY PRN
Qty: 30 TABLET | Refills: 1 | Status: SHIPPED | OUTPATIENT
Start: 2021-01-23 | End: 2021-01-23

## 2021-01-23 RX ORDER — SODIUM CHLORIDE 0.9 % (FLUSH) 0.9 %
10 SYRINGE (ML) INJECTION AS NEEDED
Status: DISCONTINUED | OUTPATIENT
Start: 2021-01-23 | End: 2021-01-23 | Stop reason: HOSPADM

## 2021-01-23 RX ORDER — MECLIZINE HYDROCHLORIDE 25 MG/1
25 TABLET ORAL 3 TIMES DAILY PRN
Qty: 30 TABLET | Refills: 2 | Status: SHIPPED | OUTPATIENT
Start: 2021-01-23

## 2021-01-23 RX ADMIN — SODIUM CHLORIDE 1000 ML: 9 INJECTION, SOLUTION INTRAVENOUS at 14:24

## 2021-01-23 RX ADMIN — SODIUM CHLORIDE, PRESERVATIVE FREE 10 ML: 5 INJECTION INTRAVENOUS at 14:15

## 2021-01-23 NOTE — DISCHARGE INSTRUCTIONS
Home to rest.  Maintain your very best hydration and nutrition.  Medications as directed.  Call the office of otolaryngologist, Dr. Meredith on Monday for a follow-up appointment.  Return to the emergency department as needed for worsening symptoms or concerns.  Thank you

## 2021-01-23 NOTE — ED PROVIDER NOTES
Subjective   Maria Eugenia Sandoval is a 65 y.o. female who presents to the ED with c/o dizziness. The patient reports her dizziness began on 1/12/2021 and it has been gradually worsening since onset. On 1/15/2021 her dizziness significantly worsened, prompting her to present to [facility] on the following day. She did not have any ear pain or sinus symptoms but she was informed that fluid was suspected to be behind her bilateral ear drums. The patient was given a steroid dose pack, which she finished yesterday, but she has not had any improvements in her symptoms. Her dizziness is exacerbated by standing up and she has to hold onto objects in her home while ambulating to not fall but it is alleviated by sitting still. She has not suffered any falls recently. The patient complains of roaring in her bilateral ears but denies any pain, nausea, vomiting, neurosensory complaints, or weakness. She has not had any recent changes in her medications. The patient has no prior history of stroke, CAD, or vertigo. There are no other acute complaints at this time.      History provided by:  Patient  Dizziness  Quality:  Lightheadedness and imbalance  Severity:  Moderate  Onset quality:  Gradual  Duration:  11 days  Timing:  Constant  Progression:  Worsening  Chronicity:  New  Context: standing up    Relieved by:  Being still  Worsened by:  Movement and standing up (ambulating)  Ineffective treatments:  Closing eyes  Associated symptoms: tinnitus    Associated symptoms: no chest pain, no nausea, no shortness of breath, no vomiting and no weakness    Risk factors: heart disease    Risk factors: no anemia, no hx of stroke, no hx of vertigo and no new medications        Review of Systems   HENT: Positive for tinnitus.         She complains of roaring in her bilateral ears.   Respiratory: Negative for shortness of breath.    Cardiovascular: Negative for chest pain.   Gastrointestinal: Negative for abdominal pain, nausea and vomiting.    Musculoskeletal: Negative for back pain and neck pain.   Neurological: Positive for dizziness. Negative for syncope, weakness and numbness.        The patient denies any neurosensory complaints or weakness.   All other systems reviewed and are negative.      Past Medical History:   Diagnosis Date   • Anxiety    • Arthritis    • Cataract    • Disease of thyroid gland    • Diverticulitis    • GERD (gastroesophageal reflux disease)    • Heart disease    • Wears contact lenses    • Wears glasses        No Known Allergies    Past Surgical History:   Procedure Laterality Date   • ANKLE OPEN REDUCTION INTERNAL FIXATION     • COLON RESECTION N/A 9/10/2018    Procedure: SIGMOID COLECTOMY, APPENDECTOMY, TAKEDOWN OF SPLENIC FEXURE,PROCTOSCOPY, UMBILICAL HERNIA REPAIR, DEBRIDEMENT & CULTURE OF PELVIC ABCESS,DIVERTING LOOP ILEOSTOMY, RIGHT URETEROLYSIS, BILATERAL SALPINGO OOPHORECTOMY;  Surgeon: Shant Lerner MD;  Location:  [facility];  Service: General   • COLONOSCOPY     • ENDOSCOPY     • ILEOSTOMY CLOSURE N/A 2/11/2019    Procedure: LOOP ILEOSTOMY TAKEDOWN;  Surgeon: Shant Lerner MD;  Location:  [facility];  Service: General   • OOPHORECTOMY     • TOTAL HIP ARTHROPLASTY Right 12/5/2017    Procedure: TOTAL HIP ARTHROPLASTY RIGHT;  Surgeon: Marek Talamantes MD;  Location:  [facility];  Service:    • TUMOR REMOVAL         Family History   Problem Relation Age of Onset   • Osteoarthritis Mother    • Breast cancer Neg Hx    • Ovarian cancer Neg Hx        Social History     Socioeconomic History   • Marital status: Single     Spouse name: Not on file   • Number of children: Not on file   • Years of education: Not on file   • Highest education level: Not on file   Tobacco Use   • Smoking status: Current Every Day Smoker     Packs/day: 0.25     Years: 15.00     Pack years: 3.75     Types: Cigarettes   • Smokeless tobacco: Never Used   • Tobacco comment: down to 5 cigarettes a day    Substance and Sexual Activity   • Alcohol use:  Yes     Comment: 2 glass a wine a night    • Drug use: No   • Sexual activity: Defer         Objective   Physical Exam  Vitals signs and nursing note reviewed.   Constitutional:       General: She is not in acute distress.     Appearance: She is well-developed.   HENT:      Head: Normocephalic and atraumatic.   Eyes:      General: No scleral icterus.     Conjunctiva/sclera: Conjunctivae normal.   Neck:      Musculoskeletal: Normal range of motion and neck supple.   Cardiovascular:      Rate and Rhythm: Normal rate and regular rhythm.      Heart sounds: Normal heart sounds. No murmur.   Pulmonary:      Effort: Pulmonary effort is normal. No respiratory distress.      Breath sounds: Normal breath sounds.   Abdominal:      Palpations: Abdomen is soft.      Tenderness: There is no abdominal tenderness.   Musculoskeletal: Normal range of motion.   Skin:     General: Skin is warm and dry.   Neurological:      Mental Status: She is alert and oriented to person, place, and time.      Sensory: Sensation is intact.      Motor: Motor function is intact.      Comments: No neurosensory deficits or focal weakness.  NO nystagmus   Psychiatric:         Behavior: Behavior normal.         Procedures         ED Course  ED Course as of Jan 23 1703   Sat Jan 23, 2021 1700 Patient's work-up is very reassuring.  She states she feels somewhat improved with only intervention being IV fluids today.  Her vital signs have been stable throughout her ED course.  We discussed the differential diagnosis of vertigo versus orthostatic dizziness.  She agrees to maintain her best hydration and monitor her improvement while also accepting our invitation to follow-up with Dr. Meredith of otolaryngology.  We discussed use of Antivert for dizziness and she is very encouraged and understands and concurs with his outpatient plan of care    [MS]      ED Course User Index  [MS] Jordana Morgan APRN     Recent Results (from the past 24 hour(s))    Urinalysis With Microscopic If Indicated (No Culture) - Urine, Clean Catch    Collection Time: 01/23/21  1:55 PM    Specimen: Urine, Clean Catch   Result Value Ref Range    Color, UA Yellow Yellow, Straw    Appearance, UA Clear Clear    pH, UA 6.0 5.0 - 8.0    Specific Gravity, UA <=1.005 1.001 - 1.030    Glucose, UA Negative Negative    Ketones, UA Negative Negative    Bilirubin, UA Negative Negative    Blood, UA Negative Negative    Protein, UA Negative Negative    Leuk Esterase, UA Negative Negative    Nitrite, UA Negative Negative    Urobilinogen, UA 0.2 E.U./dL 0.2 - 1.0 E.U./dL   ECG 12 Lead    Collection Time: 01/23/21  2:00 PM   Result Value Ref Range    QT Interval 392 ms    QTC Interval 437 ms   Comprehensive Metabolic Panel    Collection Time: 01/23/21  2:19 PM    Specimen: Blood   Result Value Ref Range    Glucose 105 (H) 65 - 99 mg/dL    BUN 18 8 - 23 mg/dL    Creatinine 0.60 0.57 - 1.00 mg/dL    Sodium 139 136 - 145 mmol/L    Potassium 3.8 3.5 - 5.2 mmol/L    Chloride 102 98 - 107 mmol/L    CO2 25.0 22.0 - 29.0 mmol/L    Calcium 9.6 8.6 - 10.5 mg/dL    Total Protein 7.2 6.0 - 8.5 g/dL    Albumin 4.20 3.50 - 5.20 g/dL    ALT (SGPT) 13 1 - 33 U/L    AST (SGOT) 17 1 - 32 U/L    Alkaline Phosphatase 78 39 - 117 U/L    Total Bilirubin 0.6 0.0 - 1.2 mg/dL    eGFR Non African Amer 101 >60 mL/min/1.73    Globulin 3.0 gm/dL    A/G Ratio 1.4 g/dL    BUN/Creatinine Ratio 30.0 (H) 7.0 - 25.0    Anion Gap 12.0 5.0 - 15.0 mmol/L   Troponin    Collection Time: 01/23/21  2:19 PM    Specimen: Blood   Result Value Ref Range    Troponin T <0.010 0.000 - 0.030 ng/mL   Magnesium    Collection Time: 01/23/21  2:19 PM    Specimen: Blood   Result Value Ref Range    Magnesium 2.1 1.6 - 2.4 mg/dL   Light Blue Top    Collection Time: 01/23/21  2:19 PM   Result Value Ref Range    Extra Tube hold for add-on    Green Top (Gel)    Collection Time: 01/23/21  2:19 PM   Result Value Ref Range    Extra Tube Hold for add-ons.     Lavender Top    Collection Time: 01/23/21  2:19 PM   Result Value Ref Range    Extra Tube hold for add-on    Gold Top - SST    Collection Time: 01/23/21  2:19 PM   Result Value Ref Range    Extra Tube Hold for add-ons.    CBC Auto Differential    Collection Time: 01/23/21  2:19 PM    Specimen: Blood   Result Value Ref Range    WBC 8.64 3.40 - 10.80 10*3/mm3    RBC 4.11 3.77 - 5.28 10*6/mm3    Hemoglobin 14.3 12.0 - 15.9 g/dL    Hematocrit 43.0 34.0 - 46.6 %    .6 (H) 79.0 - 97.0 fL    MCH 34.8 (H) 26.6 - 33.0 pg    MCHC 33.3 31.5 - 35.7 g/dL    RDW 11.9 (L) 12.3 - 15.4 %    RDW-SD 46.1 37.0 - 54.0 fl    MPV 11.2 6.0 - 12.0 fL    Platelets 192 140 - 450 10*3/mm3    Neutrophil % 59.0 42.7 - 76.0 %    Lymphocyte % 30.4 19.6 - 45.3 %    Monocyte % 8.0 5.0 - 12.0 %    Eosinophil % 1.3 0.3 - 6.2 %    Basophil % 0.8 0.0 - 1.5 %    Immature Grans % 0.5 0.0 - 0.5 %    Neutrophils, Absolute 5.10 1.70 - 7.00 10*3/mm3    Lymphocytes, Absolute 2.63 0.70 - 3.10 10*3/mm3    Monocytes, Absolute 0.69 0.10 - 0.90 10*3/mm3    Eosinophils, Absolute 0.11 0.00 - 0.40 10*3/mm3    Basophils, Absolute 0.07 0.00 - 0.20 10*3/mm3    Immature Grans, Absolute 0.04 0.00 - 0.05 10*3/mm3    nRBC 0.0 0.0 - 0.2 /100 WBC     Note: In addition to lab results from this visit, the labs listed above may include labs taken at another facility or during a different encounter within the last 24 hours. Please correlate lab times with ED admission and discharge times for further clarification of the services performed during this visit.    MRI Brain Without Contrast   Preliminary Result   Mild generalized cerebral atrophy and chronic appearing   central white matter changes. No evidence of acute or old infarct or   other acute intracranial disease.                  XR Chest 1 View   Preliminary Result   No evidence of active chest disease.       DICTATED:   01/23/2021   EDITED/ls :   01/23/2021                 Vitals:    01/23/21 1430 01/23/21 1500  01/23/21 1530 01/23/21 1629   BP: 146/77 145/79 143/79 139/76   BP Location:       Patient Position:       Pulse: 63 61 65 60   Resp: 20  20 18   Temp:       TempSrc:       SpO2: 96% 98% 96% 98%   Weight:       Height:         Medications   sodium chloride 0.9 % flush 10 mL (10 mL Intravenous Given 1/23/21 1415)   sodium chloride 0.9 % bolus 1,000 mL (0 mL Intravenous Stopped 1/23/21 1630)     ECG/EMG Results (last 24 hours)     Procedure Component Value Units Date/Time    ECG 12 Lead [[id number]] Collected: 01/23/21 1400     Updated: 01/23/21 1400        ECG 12 Lead   Final Result   Test Reason : dizziness   Blood Pressure : **/** mmHG   Vent. Rate : 075 BPM     Atrial Rate : 075 BPM      P-R Int : 144 ms          QRS Dur : 078 ms       QT Int : 392 ms       P-R-T Axes : -09 028 049 degrees      QTc Int : 437 ms      Normal sinus rhythm   Moderate voltage criteria for LVH, may be normal variant   Cannot rule out Septal infarct (cited on or before 23-JAN-2021)   Abnormal ECG   When compared with ECG of 21-NOV-2017 11:41,   No significant change was found   Confirmed by GUME NATION MD ([id number]) on 1/23/2021 3:04:53 PM      Referred By:             Confirmed By:GUME NATION MD                                                 [facility]    Final diagnoses:   Benign paroxysmal positional vertigo, unspecified laterality     DISCHARGE    Patient discharged in stable condition.    Reviewed implications of results, diagnosis, meds, responsibility to follow up, warning signs and symptoms of possible worsening, potential complications and reasons to return to ER.    Patient/Family voiced understanding of above instructions.    Discussed plan for discharge, as there is no emergent indication for admission.  Pt/family is agreeable and understands need for follow up and possible repeat testing.  Pt/family is aware that discharge does not mean that nothing is wrong but that it indicates no emergency is currently present that requires  admission and they must continue care with follow-up as given below or with a physician of their choice.     FOLLOW-UP  Ravi Walton MD  [address] [place] [address] [phone]    Schedule an appointment as soon as possible for a visit in 2 days  If symptoms worsen    Artur Meredith MD  [address]  [address]  [facility] [id number] [phone]               Medication List      New Prescriptions    meclizine 25 MG tablet  Commonly known as: ANTIVERT  Take 1 tablet by mouth 3 (Three) Times a Day As Needed for Dizziness.           Where to Get Your Medications      These medications were sent to [id number] - [place], [place] - [address]  AT [facility] - [phone]  - [phone]   [address] [place] [address]    Phone: [phone]   · meclizine 25 MG tablet         Documentation assistance provided by ilana Guthrie.  Information recorded by the ilana was done at my direction and has been verified and validated by me.     Dario Guthrie  01/23/21 142       Jordana Morgan APRN  01/23/21 1705

## 2021-02-04 ENCOUNTER — HOSPITAL ENCOUNTER (OUTPATIENT)
Dept: MAMMOGRAPHY | Facility: HOSPITAL | Age: 66
Discharge: HOME | End: 2021-02-04
Admitting: FAMILY MEDICINE

## 2021-02-04 DIAGNOSIS — Z12.31 VISIT FOR SCREENING MAMMOGRAM: ICD-10-CM

## 2021-02-04 PROCEDURE — 77063 BREAST TOMOSYNTHESIS BI: CPT | Performed by: RADIOLOGY

## 2021-02-04 PROCEDURE — 77067 SCR MAMMO BI INCL CAD: CPT

## 2021-02-04 PROCEDURE — 77067 SCR MAMMO BI INCL CAD: CPT | Performed by: RADIOLOGY

## 2021-02-04 PROCEDURE — 77063 BREAST TOMOSYNTHESIS BI: CPT

## 2021-03-04 ENCOUNTER — IMMUNIZATION (OUTPATIENT)
Dept: VACCINE CLINIC | Facility: HOSPITAL | Age: 66
End: 2021-03-04

## 2021-03-04 PROCEDURE — 91300 HC SARSCOV02 VAC 30MCG/0.3ML IM: CPT | Performed by: INTERNAL MEDICINE

## 2021-03-04 PROCEDURE — 0001A: CPT | Performed by: INTERNAL MEDICINE

## 2021-03-25 ENCOUNTER — IMMUNIZATION (OUTPATIENT)
Dept: VACCINE CLINIC | Facility: HOSPITAL | Age: 66
End: 2021-03-25

## 2021-03-25 PROCEDURE — 0002A: CPT | Performed by: INTERNAL MEDICINE

## 2021-03-25 PROCEDURE — 91300 HC SARSCOV02 VAC 30MCG/0.3ML IM: CPT | Performed by: INTERNAL MEDICINE

## 2022-06-16 ENCOUNTER — TRANSCRIBE ORDERS (OUTPATIENT)
Dept: ADMINISTRATIVE | Facility: HOSPITAL | Age: 67
End: 2022-06-16

## 2022-06-16 DIAGNOSIS — Z12.31 VISIT FOR SCREENING MAMMOGRAM: Primary | ICD-10-CM

## 2022-06-16 DIAGNOSIS — Z13.820 OSTEOPOROSIS SCREENING: ICD-10-CM

## 2022-08-24 ENCOUNTER — HOSPITAL ENCOUNTER (OUTPATIENT)
Dept: BONE DENSITY | Facility: HOSPITAL | Age: 67
Discharge: HOME | End: 2022-08-24

## 2022-08-24 ENCOUNTER — HOSPITAL ENCOUNTER (OUTPATIENT)
Dept: MAMMOGRAPHY | Facility: HOSPITAL | Age: 67
Discharge: HOME | End: 2022-08-24

## 2022-08-24 DIAGNOSIS — Z13.820 OSTEOPOROSIS SCREENING: ICD-10-CM

## 2022-08-24 DIAGNOSIS — Z12.31 VISIT FOR SCREENING MAMMOGRAM: ICD-10-CM

## 2022-08-24 PROCEDURE — 77063 BREAST TOMOSYNTHESIS BI: CPT | Performed by: RADIOLOGY

## 2022-08-24 PROCEDURE — 77067 SCR MAMMO BI INCL CAD: CPT

## 2022-08-24 PROCEDURE — 77080 DXA BONE DENSITY AXIAL: CPT

## 2022-08-24 PROCEDURE — 77067 SCR MAMMO BI INCL CAD: CPT | Performed by: RADIOLOGY

## 2022-08-24 PROCEDURE — 77063 BREAST TOMOSYNTHESIS BI: CPT

## 2022-10-19 ENCOUNTER — TELEPHONE (OUTPATIENT)
Dept: FAMILY MEDICINE CLINIC | Facility: CLINIC | Age: 67
End: 2022-10-19

## 2022-10-19 NOTE — TELEPHONE ENCOUNTER
Caller: Maria Eugenia Sandoval    Relationship: Self    Best call back number:  [phone]    What test was performed: LABS    When was the test performed: 9/7/22    Where was the test performed:  [facility] IN [place]    Additional notes:     ELSY ORDERED BEFORE HE RETIRED.  PLEASE CALL PATIENT WITH RESULTS

## 2022-10-21 DIAGNOSIS — E03.9 HYPOTHYROIDISM, UNSPECIFIED TYPE: Primary | ICD-10-CM

## 2022-10-21 RX ORDER — LEVOTHYROXINE SODIUM 0.07 MG/1
75 TABLET ORAL DAILY
Qty: 90 TABLET | Refills: 2 | Status: SHIPPED | OUTPATIENT
Start: 2022-10-21

## 2022-12-02 RX ORDER — LEVOTHYROXINE SODIUM 88 UG/1
TABLET ORAL
Qty: 90 TABLET | Refills: 1 | Status: SHIPPED | OUTPATIENT
Start: 2022-12-02 | End: 2023-02-16

## 2022-12-21 ENCOUNTER — OFFICE VISIT (OUTPATIENT)
Dept: ORTHOPEDIC SURGERY | Facility: CLINIC | Age: 67
End: 2022-12-21

## 2022-12-21 VITALS
BODY MASS INDEX: 28.17 KG/M2 | WEIGHT: 159 LBS | SYSTOLIC BLOOD PRESSURE: 156 MMHG | HEIGHT: 63 IN | DIASTOLIC BLOOD PRESSURE: 98 MMHG

## 2022-12-21 DIAGNOSIS — Z96.641 STATUS POST TOTAL REPLACEMENT OF RIGHT HIP: Primary | ICD-10-CM

## 2022-12-21 PROCEDURE — 99203 OFFICE O/P NEW LOW 30 MIN: CPT | Performed by: ORTHOPAEDIC SURGERY

## 2022-12-21 RX ORDER — TRIAMTERENE AND HYDROCHLOROTHIAZIDE 37.5; 25 MG/1; MG/1
CAPSULE ORAL
COMMUNITY
Start: 2022-12-12

## 2022-12-21 RX ORDER — LANOLIN ALCOHOL/MO/W.PET/CERES
1000 CREAM (GRAM) TOPICAL DAILY
COMMUNITY

## 2022-12-21 NOTE — PROGRESS NOTES
[facility] Note    Subjective     Chief Complaint   Patient presents with   • Right Hip - Pain     Hip arthroplasty 12/2017         HPI    Maria Eugenia Sandoval is a 67 y.o. female who presents with new problem of: right hip pain.  Onset: atraumatic and gradual in nature. The issue has been ongoing for 3 month(s). Pain is a 5/10 on the pain scale. Pain is described as aching. Associated symptoms include stiffness. The pain is worse with standing and lying on affected side; heat and pain medication and/or NSAID improve the pain. Previous treatments have included: NSAIDS and physical therapy.  Overall her hip feels much better than before surgery 5 years ago, with 95% improvement compared to her preoperative symptoms.  Ambulatory without external aids, but she is recovering from cervical myelopathy, and had surgery in April.  She still has some residual symptoms in her upper and lower extremities.  No groin pain.  Intermittent pain on the lateral aspect of her hip when she sleeps on that side.    I have reviewed the following portions of the patient's history and agree with: History of Present Illness and Review of Systems    Patient Active Problem List   Diagnosis   • Primary osteoarthritis of right hip   • Status post total replacement of right hip   • Hypothyroid   • Tobacco abuse     Past Medical History:   Diagnosis Date   • Anxiety    • Arthritis    • Cataract    • CTS (carpal tunnel syndrome) 2022   • Disease of thyroid gland    • Diverticulitis    • Fracture of ankle 2007   • Fracture, clavicle 2012   • GERD (gastroesophageal reflux disease)    • Heart disease    • Hip arthrosis 2017   • Wears contact lenses    • Wears glasses       Past Surgical History:   Procedure Laterality Date   • ANKLE OPEN REDUCTION INTERNAL FIXATION     • COLON RESECTION N/A 09/10/2018    Procedure: SIGMOID COLECTOMY, APPENDECTOMY, TAKEDOWN OF SPLENIC FEXURE,PROCTOSCOPY, UMBILICAL HERNIA REPAIR, DEBRIDEMENT & CULTURE  OF PELVIC ABCESS,DIVERTING LOOP ILEOSTOMY, RIGHT URETEROLYSIS, BILATERAL SALPINGO OOPHORECTOMY;  Surgeon: Shant Lerner MD;  Location:  [facility];  Service: General   • COLONOSCOPY     • ENDOSCOPY     • ILEOSTOMY CLOSURE N/A 02/11/2019    Procedure: LOOP ILEOSTOMY TAKEDOWN;  Surgeon: Shant Lerner MD;  Location:  [facility];  Service: General   • NECK SURGERY  4/11/22    Laminoplasty   • OOPHORECTOMY     • TOTAL HIP ARTHROPLASTY Right 12/05/2017    Procedure: TOTAL HIP ARTHROPLASTY RIGHT;  Surgeon: Marek Talamantes MD;  Location:  [facility];  Service:    • TUMOR REMOVAL        Family History   Problem Relation Age of Onset   • Osteoarthritis Mother    • Breast cancer Neg Hx    • Ovarian cancer Neg Hx      Social History     Socioeconomic History   • Marital status: Single   Tobacco Use   • Smoking status: Every Day     Packs/day: 0.25     Years: 15.00     Pack years: 3.75     Types: Cigarettes   • Smokeless tobacco: Never   • Tobacco comments:     down to 5 cigarettes a day    Substance and Sexual Activity   • Alcohol use: Yes     Alcohol/week: 10.0 standard drinks     Types: 10 Glasses of wine per week     Comment: 2 glass a wine a night    • Drug use: No   • Sexual activity: Not Currently     Partners: Male      Current Outpatient Medications on File Prior to Visit   Medication Sig Dispense Refill   • calcium carbonate (OS-YASMINE) 600 MG tablet Take 600 mg by mouth Daily.     • Cholecalciferol 91228 units capsule Take  by mouth 1 (One) Time Per Week.     • ibuprofen (ADVIL,MOTRIN) 200 MG tablet Take 3 tablets by mouth Every 6 (Six) Hours As Needed for Mild Pain  or Moderate Pain . Must take an hour after aspirin if needed.     • levothyroxine (Synthroid) 75 MCG tablet Take 1 tablet by mouth Daily. 90 tablet 2   • triamterene-hydrochlorothiazide (DYAZIDE) 37.5-25 MG per capsule      • vitamin B-12 (CYANOCOBALAMIN) 1000 MCG tablet Take 1,000 mcg by mouth Daily.     • levothyroxine (SYNTHROID, LEVOTHROID) 88 MCG  tablet TAKE 1 TABLET BY MOUTH EVERY DAY 90 tablet 1   • meclizine (ANTIVERT) 25 MG tablet Take 1 tablet by mouth 3 (Three) Times a Day As Needed for Dizziness. 30 tablet 2   • [DISCONTINUED] aspirin 81 MG EC tablet Take 81 mg by mouth Daily.       No current facility-administered medications on file prior to visit.      No Known Allergies     Review of Systems   Constitutional: Negative for activity change, appetite change, chills, diaphoresis, fatigue, fever and unexpected weight change.   HENT: Negative for congestion, dental problem, drooling, ear discharge, ear pain, facial swelling, hearing loss, mouth sores, nosebleeds, postnasal drip, rhinorrhea, sinus pressure, sneezing, sore throat, tinnitus, trouble swallowing and voice change.    Eyes: Negative for photophobia, pain, discharge, redness, itching and visual disturbance.   Respiratory: Negative for apnea, cough, choking, chest tightness, shortness of breath, wheezing and stridor.    Cardiovascular: Negative for chest pain, palpitations and leg swelling.   Gastrointestinal: Negative for abdominal distention, abdominal pain, anal bleeding, blood in stool, constipation, diarrhea, nausea, rectal pain and vomiting.   Endocrine: Negative for cold intolerance, heat intolerance, polydipsia, polyphagia and polyuria.   Genitourinary: Negative for decreased urine volume, difficulty urinating, dysuria, enuresis, flank pain, frequency, genital sores, hematuria and urgency.   Musculoskeletal: Positive for arthralgias. Negative for back pain, gait problem, joint swelling, myalgias, neck pain and neck stiffness.   Skin: Negative for color change, pallor, rash and wound.   Allergic/Immunologic: Negative for environmental allergies, food allergies and immunocompromised state.   Neurological: Negative for dizziness, tremors, seizures, syncope, facial asymmetry, speech difficulty, weakness, light-headedness, numbness and headaches.   Hematological: Negative for adenopathy. Does  "not bruise/bleed easily.   Psychiatric/Behavioral: Negative for agitation, behavioral problems, confusion, decreased concentration, dysphoric mood, hallucinations, self-injury, sleep disturbance and suicidal ideas. The patient is not nervous/anxious and is not hyperactive.         Objective      Physical Exam  /98   Ht 160 cm (63\")   Wt 72.1 kg (159 lb)   BMI 28.17 kg/m²     Body mass index is 28.17 kg/m².    General:   Mental Status:  Alert   Appearance: Cooperative, in no acute distress   Build and Nutrition: Well-nourished female   Orientation: Alert and oriented to person, place and time   Posture: Normal   Gait: Nonantalgic, but unsteady    Integument:              Right hip: Wound is well-healed with no signs of infection     Lower Extremity:              Right Hip:                          Tenderness:    None                          Swelling:          None                          Crepitus:          None                          Range of motion:        External Rotation:       40°                                                              Internal Rotation:        40°                                                              Flexion:                       100°                                                              Extension:                   0°                       Deformities:     None  [facility]al testing[facility]                          No leg length discrepancy    Imaging/Studies      Imaging Results (Last 24 Hours)     ** No results found for the last 24 hours. **          Assessment and Plan     Diagnoses and all orders for this visit:    1. Status post total replacement of right hip (Primary)  -     XR Hip With or Without Pelvis 2 - 3 View Right; Future        1. Status post total replacement of right hip        I reviewed my findings with the patient.  Her right total hip arthroplasty components appear to be stable, and I suspect that she is experiencing " intermittent tendinitis/bursitis.  Conservative treatment recommended.  I will see her back in 2 years with an x-ray, but sooner for any problems.    Return in about 2 years (around 12/21/2024).      Marek Talamantes MD  12/21/22  09:06 EST

## 2023-02-16 ENCOUNTER — OFFICE VISIT (OUTPATIENT)
Dept: FAMILY MEDICINE CLINIC | Facility: CLINIC | Age: 68
End: 2023-02-16
Payer: MEDICARE

## 2023-02-16 VITALS
HEIGHT: 63 IN | DIASTOLIC BLOOD PRESSURE: 98 MMHG | HEART RATE: 80 BPM | BODY MASS INDEX: 28.17 KG/M2 | SYSTOLIC BLOOD PRESSURE: 142 MMHG | TEMPERATURE: 98.6 F | WEIGHT: 159 LBS

## 2023-02-16 DIAGNOSIS — J06.9 ACUTE URI: Primary | ICD-10-CM

## 2023-02-16 DIAGNOSIS — U07.1 COVID-19: ICD-10-CM

## 2023-02-16 LAB
EXPIRATION DATE: ABNORMAL
EXPIRATION DATE: NORMAL
EXPIRATION DATE: NORMAL
FLUAV AG NPH QL: NEGATIVE
FLUAV AG UPPER RESP QL IA.RAPID: NOT DETECTED
FLUBV AG NPH QL: NEGATIVE
FLUBV AG UPPER RESP QL IA.RAPID: NOT DETECTED
INTERNAL CONTROL: ABNORMAL
INTERNAL CONTROL: NORMAL
INTERNAL CONTROL: NORMAL
Lab: ABNORMAL
Lab: NORMAL
Lab: NORMAL
S PYO AG THROAT QL: NEGATIVE
SARS-COV-2 AG UPPER RESP QL IA.RAPID: DETECTED

## 2023-02-16 PROCEDURE — 99213 OFFICE O/P EST LOW 20 MIN: CPT | Performed by: NURSE PRACTITIONER

## 2023-02-16 PROCEDURE — 87880 STREP A ASSAY W/OPTIC: CPT | Performed by: NURSE PRACTITIONER

## 2023-02-16 PROCEDURE — 87428 SARSCOV & INF VIR A&B AG IA: CPT | Performed by: NURSE PRACTITIONER

## 2023-02-16 RX ORDER — DEXTROMETHORPHAN HYDROBROMIDE AND PROMETHAZINE HYDROCHLORIDE 15; 6.25 MG/5ML; MG/5ML
5 SYRUP ORAL 4 TIMES DAILY PRN
Qty: 180 ML | Refills: 0 | Status: SHIPPED | OUTPATIENT
Start: 2023-02-16

## 2023-02-16 RX ORDER — CHOLECALCIFEROL (VITAMIN D3) 125 MCG
CAPSULE ORAL
COMMUNITY
Start: 2022-12-16

## 2023-02-16 NOTE — PROGRESS NOTES
Answers for HPI/ROS submitted by the patient on 2/15/2023  Please describe your symptoms.: Chest congestion, headaches, sore throat and fever  Have you had these symptoms before?: No  How long have you been having these symptoms?: 1-4 days  Please list any medications you are currently taking for this condition.: Aleve, arianna seltzer day cold and flu  Please describe any probable cause for these symptoms. : Just returned from [place] and red tide was bad  What is the primary reason for your visit?: Other        Office Note     Name: Maria Eugenia Sandoval    : 1955     MRN: [id number]     Chief Complaint  Chills, Generalized Body Aches, Headache, Cough, and Fever (Pt states she had fever 2/15/23)    Subjective     History of Present Illness:  Maria Eugenia Sandoval is a 67 y.o. female who presents today who presents with acute complaints of chills, scratchy throat, body aches, headache, cough and fever that began yesterday.  Just returned from a trip to [place] where there is significant issues with red tide currently, and feels like this could be causing her symptoms.  Denies any GI symptoms including nausea vomiting or diarrhea.  Some mild decrease in appetite but has been able to maintain adequate hydration.  She has utilized over-the-counter medications including Aleve and Arianna-[place] cold and flu medications with some benefit.  Denies any respiratory distress, shortness of breath or difficulty breathing.  Reports some wheezing.  Cough is currently nonproductive in nature.  No further complaints or concerns    Review of Systems   Constitutional: Positive for activity change, appetite change, chills, fatigue and fever.   HENT: Positive for congestion, sinus pressure and sore throat.    Eyes: Negative for photophobia and visual disturbance.   Respiratory: Positive for cough. Negative for chest tightness, shortness of breath and wheezing.    Cardiovascular: Negative for chest pain, palpitations and leg swelling.  "  Gastrointestinal: Negative for abdominal pain, constipation, diarrhea, nausea and vomiting.   Endocrine: Negative for polydipsia and polyuria.   Musculoskeletal: Positive for myalgias. Negative for arthralgias.   Skin: Negative for rash and bruise.   Neurological: Positive for headache. Negative for weakness and light-headedness.       Objective     Past Medical History:   Diagnosis Date   • Anxiety    • Arthritis    • Cataract    • CTS (carpal tunnel syndrome) 2022   • Disease of thyroid gland    • Diverticulitis    • Fracture of ankle 2007   • Fracture, clavicle 2012   • GERD (gastroesophageal reflux disease)    • Heart disease    • Hip arthrosis 2017   • Wears contact lenses    • Wears glasses      Past Surgical History:   Procedure Laterality Date   • ANKLE OPEN REDUCTION INTERNAL FIXATION     • COLON RESECTION N/A 09/10/2018    Procedure: SIGMOID COLECTOMY, APPENDECTOMY, TAKEDOWN OF SPLENIC FEXURE,PROCTOSCOPY, UMBILICAL HERNIA REPAIR, DEBRIDEMENT & CULTURE OF PELVIC ABCESS,DIVERTING LOOP ILEOSTOMY, RIGHT URETEROLYSIS, BILATERAL SALPINGO OOPHORECTOMY;  Surgeon: Shant Lerner MD;  Location:  [facility];  Service: General   • COLONOSCOPY     • ENDOSCOPY     • ILEOSTOMY CLOSURE N/A 02/11/2019    Procedure: LOOP ILEOSTOMY TAKEDOWN;  Surgeon: Shant Lerner MD;  Location:  [facility];  Service: General   • NECK SURGERY  4/11/22    Laminoplasty   • OOPHORECTOMY     • TOTAL HIP ARTHROPLASTY Right 12/05/2017    Procedure: TOTAL HIP ARTHROPLASTY RIGHT;  Surgeon: Marek Talamantes MD;  Location:  [facility];  Service:    • TUMOR REMOVAL       Family History   Problem Relation Age of Onset   • Osteoarthritis Mother    • Breast cancer Neg Hx    • Ovarian cancer Neg Hx        Vital Signs  /98   Pulse 80   Temp 98.6 °F (37 °C) (Infrared)   Ht 160 cm (62.99\")   Wt 72.1 kg (159 lb)   BMI 28.17 kg/m²   Estimated body mass index is 28.17 kg/m² as calculated from the following:    Height as of this encounter: 160 cm " "(62.99\").    Weight as of this encounter: 72.1 kg (159 lb).    Physical Exam  Vitals reviewed.   Constitutional:       Appearance: She is ill-appearing.   HENT:      Head: Normocephalic and atraumatic.      Right Ear: Ear canal and external ear normal.      Left Ear: Tympanic membrane, ear canal and external ear normal.      Nose: Congestion present.      Mouth/Throat:      Pharynx: Oropharynx is clear. Posterior oropharyngeal erythema present.   Eyes:      Conjunctiva/sclera: Conjunctivae normal.   Cardiovascular:      Rate and Rhythm: Normal rate and regular rhythm.      Pulses: Normal pulses.      Heart sounds: Normal heart sounds.   Pulmonary:      Effort: Pulmonary effort is normal. No respiratory distress.      Breath sounds: Normal breath sounds. No wheezing or rhonchi.   Abdominal:      General: Bowel sounds are normal.      Palpations: Abdomen is soft.   Musculoskeletal:      Cervical back: Normal range of motion and neck supple.   Skin:     General: Skin is warm and dry.   Neurological:      Mental Status: She is alert and oriented to person, place, and time.   Psychiatric:         Mood and Affect: Mood normal.         Behavior: Behavior normal.         Thought Content: Thought content normal.         Judgment: Judgment normal.            POCT Results (if applicable):  Results for orders placed or performed in visit on 02/16/23   POC Rapid Strep A    Specimen: Swab   Result Value Ref Range    Rapid Strep A Screen Negative Negative, VALID, INVALID, Not Performed    Internal Control Passed Passed    Lot Number [id number]     Expiration Date 09/12/2024    POCT SARS-CoV-2 Antigen LINDA    Specimen: Swab   Result Value Ref Range    SARS Antigen Detected (A) Not Detected, Presumptive Negative    Influenza A Antigen LINDA Not Detected Not Detected    Influenza B Antigen LINDA Not Detected Not Detected    Internal Control Passed Passed    Lot Number [id number]     Expiration Date 06/04/2023    POCT Influenza A/B    " Specimen: Swab   Result Value Ref Range    Rapid Influenza A Ag Negative Negative    Rapid Influenza B Ag Negative Negative    Internal Control Passed Passed    Lot Number [id number]     Expiration Date 07/31/2023             Assessment and Plan     Diagnoses and all orders for this visit:    1. Acute URI (Primary)  -     POC Rapid Strep A  -     POCT SARS-CoV-2 Antigen LINDA  -     POCT Influenza A/B    2. COVID-19  Assessment & Plan:  Just returned from a trip to [place] in which she used flying as her mode of transportation.  Yesterday began with viral symptoms of chills, scratchy throat, body aches, headache, cough and fever.  She is positive for COVID-19 on office testing today.  Negative for influenza and strep.    -Begin 5-day isolation at home.  After initial 5-day.,  If symptoms are not improving she may resume normal activities while continuing to wear a mask for the next 5 days.  -Continue symptom management with Aleve and Arianna-[place] cold and flu medications.  Discussed the addition of saline nasal spray for congestion.  I am sending him Promethazine DM for cough  -Discussed if symptoms worsen or she develops any sort of respiratory distress to report to the local emergency department for further evaluation    Orders:  -     promethazine-dextromethorphan (PROMETHAZINE-DM) 6.25-15 MG/5ML syrup; Take 5 mL by mouth 4 (Four) Times a Day As Needed for Cough.  Dispense: 180 mL; Refill: 0          Follow Up  No follow-ups on file.    Riya Edmonds, MARÍA

## 2023-02-16 NOTE — ASSESSMENT & PLAN NOTE
Just returned from a trip to [place] in which she used flying as her mode of transportation.  Yesterday began with viral symptoms of chills, scratchy throat, body aches, headache, cough and fever.  She is positive for COVID-19 on office testing today.  Negative for influenza and strep.    -Begin 5-day isolation at home.  After initial 5-day.,  If symptoms are not improving she may resume normal activities while continuing to wear a mask for the next 5 days.  -Continue symptom management with Aleve and Yevgeniy cold and flu medications.  Discussed the addition of saline nasal spray for congestion.  I am sending him Promethazine DM for cough  -Discussed if symptoms worsen or she develops any sort of respiratory distress to report to the local emergency department for further evaluation

## 2023-04-17 ENCOUNTER — OFFICE VISIT (OUTPATIENT)
Dept: FAMILY MEDICINE CLINIC | Facility: CLINIC | Age: 68
End: 2023-04-17
Payer: MEDICARE

## 2023-04-17 VITALS
DIASTOLIC BLOOD PRESSURE: 100 MMHG | TEMPERATURE: 97.4 F | HEIGHT: 63 IN | SYSTOLIC BLOOD PRESSURE: 152 MMHG | OXYGEN SATURATION: 96 % | RESPIRATION RATE: 16 BRPM | HEART RATE: 60 BPM | WEIGHT: 164.2 LBS | BODY MASS INDEX: 29.09 KG/M2

## 2023-04-17 DIAGNOSIS — I10 PRIMARY HYPERTENSION: ICD-10-CM

## 2023-04-17 DIAGNOSIS — G56.03 BILATERAL CARPAL TUNNEL SYNDROME: Primary | ICD-10-CM

## 2023-04-17 PROBLEM — M81.0 OSTEOPOROSIS: Status: ACTIVE | Noted: 2023-04-17

## 2023-04-17 PROCEDURE — 3077F SYST BP >= 140 MM HG: CPT | Performed by: NURSE PRACTITIONER

## 2023-04-17 PROCEDURE — 99213 OFFICE O/P EST LOW 20 MIN: CPT | Performed by: NURSE PRACTITIONER

## 2023-04-17 PROCEDURE — 3080F DIAST BP >= 90 MM HG: CPT | Performed by: NURSE PRACTITIONER

## 2023-04-17 NOTE — PROGRESS NOTES
Office Note     Name: Maria Eugenia Sandoval    : 1955     MRN: [id number]     Chief Complaint  neuropathy follow up    Subjective     History of Present Illness:  Maria Eugenia Sandoval is a 67 y.o. female who presents today under the advice of her neurologist.  She reports that she was seen for her regular follow-up  with Dr. Rouzbeh in regards to bilateral carpal tunnel syndrome and status post cervical spine fusion.  Her carpal tunnel has progressed to the point that her left hand is now completely numb.  She is contemplating proceeding with carpal tunnel release surgery as advised by her neurologist.  However, during the visit he advised she be seen by her primary for suggested supplements that may be used for nerve pain.  She felt like he mentioned keratin as one of the options.  She has no further complaints or concerns  Review of Systems   Constitutional: Negative for activity change, appetite change and fatigue.   Eyes: Negative for blurred vision and double vision.   Respiratory: Negative for cough and shortness of breath.    Cardiovascular: Negative for chest pain, palpitations and leg swelling.   Gastrointestinal: Negative for abdominal pain, constipation, diarrhea, nausea and vomiting.   Endocrine: Negative for polydipsia and polyuria.   Musculoskeletal: Negative for arthralgias and myalgias.   Skin: Negative for rash.   Neurological: Positive for weakness and numbness. Negative for dizziness, syncope, light-headedness and headache.       Objective     Past Medical History:   Diagnosis Date   • Anxiety    • Arthritis    • Cataract    • CTS (carpal tunnel syndrome)    • Disease of thyroid gland    • Diverticulitis    • Fracture of ankle    • Fracture, clavicle    • GERD (gastroesophageal reflux disease)    • Heart disease    • Hip arthrosis    • Wears contact lenses    • Wears glasses      Past Surgical History:   Procedure Laterality Date   • ANKLE OPEN REDUCTION INTERNAL FIXATION     • COLON  "RESECTION N/A 09/10/2018    Procedure: SIGMOID COLECTOMY, APPENDECTOMY, TAKEDOWN OF SPLENIC FEXURE,PROCTOSCOPY, UMBILICAL HERNIA REPAIR, DEBRIDEMENT & CULTURE OF PELVIC ABCESS,DIVERTING LOOP ILEOSTOMY, RIGHT URETEROLYSIS, BILATERAL SALPINGO OOPHORECTOMY;  Surgeon: Shant Lerner MD;  Location:  [facility];  Service: General   • COLONOSCOPY     • ENDOSCOPY     • ILEOSTOMY CLOSURE N/A 02/11/2019    Procedure: LOOP ILEOSTOMY TAKEDOWN;  Surgeon: Shant Lerner MD;  Location:  [facility];  Service: General   • NECK SURGERY  4/11/22    Laminoplasty   • OOPHORECTOMY     • TOTAL HIP ARTHROPLASTY Right 12/05/2017    Procedure: TOTAL HIP ARTHROPLASTY RIGHT;  Surgeon: Marek Talamantes MD;  Location:  [facility];  Service:    • TUMOR REMOVAL       Family History   Problem Relation Age of Onset   • Osteoarthritis Mother    • Breast cancer Neg Hx    • Ovarian cancer Neg Hx        Vital Signs  /100 (BP Location: Left arm, Patient Position: Sitting, Cuff Size: Adult)   Pulse 60   Temp 97.4 °F (36.3 °C) (Temporal)   Resp 16   Ht 160 cm (63\")   Wt 74.5 kg (164 lb 3.2 oz)   SpO2 96%   BMI 29.09 kg/m²   Estimated body mass index is 29.09 kg/m² as calculated from the following:    Height as of this encounter: 160 cm (63\").    Weight as of this encounter: 74.5 kg (164 lb 3.2 oz).    Physical Exam  Vitals reviewed.   Constitutional:       Appearance: Normal appearance.   HENT:      Head: Normocephalic and atraumatic.   Eyes:      Conjunctiva/sclera: Conjunctivae normal.   Cardiovascular:      Rate and Rhythm: Normal rate and regular rhythm.      Pulses: Normal pulses.      Heart sounds: Normal heart sounds.   Pulmonary:      Breath sounds: Normal breath sounds.   Musculoskeletal:      Right hand: Decreased strength.      Left hand: Decreased strength of thumb/finger opposition. Decreased sensation. Normal capillary refill. Normal pulse.   Skin:     General: Skin is warm and dry.   Neurological:      Mental Status: She is alert " and oriented to person, place, and time.      Coordination: Coordination is intact.      Gait: Gait is intact.   Psychiatric:         Mood and Affect: Mood normal.         Behavior: Behavior normal.         Thought Content: Thought content normal.         Judgment: Judgment normal.              POCT Results (if applicable):  Results for orders placed or performed in visit on 02/16/23   POC Rapid Strep A    Specimen: Swab   Result Value Ref Range    Rapid Strep A Screen Negative Negative, VALID, INVALID, Not Performed    Internal Control Passed Passed    Lot Number [id number]     Expiration Date 09/12/2024    POCT SARS-CoV-2 Antigen LINDA    Specimen: Swab   Result Value Ref Range    SARS Antigen Detected (A) Not Detected, Presumptive Negative    Influenza A Antigen LINDA Not Detected Not Detected    Influenza B Antigen LINDA Not Detected Not Detected    Internal Control Passed Passed    Lot Number [id number]     Expiration Date 06/04/2023    POCT Influenza A/B    Specimen: Swab   Result Value Ref Range    Rapid Influenza A Ag Negative Negative    Rapid Influenza B Ag Negative Negative    Internal Control Passed Passed    Lot Number [id number]     Expiration Date 07/31/2023             Assessment and Plan     Diagnoses and all orders for this visit:    1. Bilateral carpal tunnel syndrome (Primary)  Assessment & Plan:  By  neurology and neurosurgical services.  Her carpal tunnel is so advanced in the left hand is now completely numb.  She has tried to avoid surgery for as long as possible, but is now considering it may be time to proceed.  Under the advice of her neurologist she is interested in potential supplements that may ease nerve discomfort.  We discussed initiation of gabapentin which she is not currently interested in.  In regards to holistic methods we discussed carnitine, valproic acid B vitamins, fish oil, acetylcysteine and turmeric which are all antioxidants known for anti-inflammatory and analgesic properties.   Reports she will trial these supplements and follow-up with her neurologist in regards to surgery        2. Primary hypertension  Assessment & Plan:  Her blood pressure is elevated in office today, 152/100.  He has not been monitoring her blood pressure at home.  Any recent headache or dizziness.  She is no longer taking her Dyazide, reporting Dr. Rizzo told her she no longer needed it.  We discussed the fact that it was likely given for her blood pressure and she needed to resume taking the medication.  Also advised that she begin home blood pressure monitoring, if not frequently under goal of less than 130/80 needs to come back for further medication adjustment          Follow Up  No follow-ups on file.    Riya Edmonds, APRN

## 2023-04-19 PROBLEM — I10 PRIMARY HYPERTENSION: Status: ACTIVE | Noted: 2023-04-19

## 2023-04-19 NOTE — ASSESSMENT & PLAN NOTE
Her blood pressure is elevated in office today, 152/100.  He has not been monitoring her blood pressure at home.  Any recent headache or dizziness.  She is no longer taking her Dyazide, reporting Dr. Rizzo told her she no longer needed it.  We discussed the fact that it was likely given for her blood pressure and she needed to resume taking the medication.  Also advised that she begin home blood pressure monitoring, if not frequently under goal of less than 130/80 needs to come back for further medication adjustment

## 2023-04-19 NOTE — ASSESSMENT & PLAN NOTE
By [facility] and neurosurgical services.  Her carpal tunnel is so advanced in the left hand is now completely numb.  She has tried to avoid surgery for as long as possible, but is now considering it may be time to proceed.  Under the advice of her neurologist she is interested in potential supplements that may ease nerve discomfort.  We discussed initiation of gabapentin which she is not currently interested in.  In regards to holistic methods we discussed carnitine, valproic acid B vitamins, fish oil, acetylcysteine and turmeric which are all antioxidants known for anti-inflammatory and analgesic properties.  Reports she will trial these supplements and follow-up with her neurologist in regards to surgery

## 2023-06-21 PROBLEM — G99.2 MYELOPATHY CONCURRENT WITH AND DUE TO SPINAL STENOSIS OF CERVICAL REGION: Status: ACTIVE | Noted: 2023-06-21

## 2023-06-21 PROBLEM — M48.02 MYELOPATHY CONCURRENT WITH AND DUE TO SPINAL STENOSIS OF CERVICAL REGION: Status: ACTIVE | Noted: 2023-06-21

## 2023-07-27 ENCOUNTER — LAB (OUTPATIENT)
Dept: LAB | Facility: HOSPITAL | Age: 68
End: 2023-07-27
Payer: MEDICARE

## 2023-07-27 DIAGNOSIS — E03.9 ACQUIRED HYPOTHYROIDISM: ICD-10-CM

## 2023-07-27 LAB — TSH SERPL DL<=0.05 MIU/L-ACNC: 3.46 UIU/ML (ref 0.27–4.2)

## 2023-07-27 PROCEDURE — 84443 ASSAY THYROID STIM HORMONE: CPT

## 2023-08-01 ENCOUNTER — TELEPHONE (OUTPATIENT)
Dept: FAMILY MEDICINE CLINIC | Facility: CLINIC | Age: 68
End: 2023-08-01
Payer: MEDICARE

## 2023-08-28 RX ORDER — LEVOTHYROXINE SODIUM 0.1 MG/1
100 TABLET ORAL DAILY
Qty: 30 TABLET | Refills: 1 | Status: SHIPPED | OUTPATIENT
Start: 2023-08-28

## 2023-08-28 NOTE — TELEPHONE ENCOUNTER
Caller: Maria Eugenia Sandoval    Relationship: Self    Best call back number: [phone]    What orders are you requesting (i.e. lab or imaging): CT CHEST LOW DOSE     In what timeframe would the patient need to come in: OCTOBER    Where will you receive your lab/imaging services: [facility]    Additional notes: PATIENT HAD A CT LOW DOSE OF CHEST CANCER SCREEN BACK IN JULY AND NEEDS TO DO ANOTHER IN OCTOBER HOWEVER NO ORDER HAS BEEN PLACED FOR PATIENT TO SCHEDULE. PLEASE ADVISE

## 2023-08-28 NOTE — TELEPHONE ENCOUNTER
Caller: Sandoval Maria Eugenia H    Relationship: Self    Best call back number: [phone]    Requested Prescriptions:   Requested Prescriptions     Pending Prescriptions Disp Refills    levothyroxine (Synthroid) 100 MCG tablet 30 tablet 1     Sig: Take 1 tablet by mouth Daily.        Pharmacy where request should be sent:    [facility] [phone]  Last office visit with prescribing clinician: 6/21/2023   Last telemedicine visit with prescribing clinician: Visit date not found   Next office visit with prescribing clinician: Visit date not found     Additional details provided by patient: PATIENT HAS 1 PILL LEFT    Does the patient have less than a 3 day supply:  [x] Yes  [] No    Would you like a call back once the refill request has been completed: [x] Yes [] No    If the office needs to give you a call back, can they leave a voicemail: [x] Yes [] No    Sayda Snow Rep   08/28/23 11:09 EDT

## 2023-10-26 ENCOUNTER — TELEPHONE (OUTPATIENT)
Dept: FAMILY MEDICINE CLINIC | Facility: CLINIC | Age: 68
End: 2023-10-26
Payer: MEDICARE

## 2023-10-26 DIAGNOSIS — R93.89 ABNORMAL CT SCAN: Primary | ICD-10-CM

## 2023-10-26 DIAGNOSIS — R91.1 LUNG NODULE: ICD-10-CM

## 2023-10-26 RX ORDER — LEVOTHYROXINE SODIUM 0.1 MG/1
100 TABLET ORAL DAILY
Qty: 30 TABLET | Refills: 1 | Status: SHIPPED | OUTPATIENT
Start: 2023-10-26

## 2023-10-26 NOTE — TELEPHONE ENCOUNTER
Caller: Jaime Maria Eugenia LINDER    Relationship: Self    Best call back number: [phone]     Requested Prescriptions:   Requested Prescriptions     Pending Prescriptions Disp Refills    levothyroxine (Synthroid) 100 MCG tablet 30 tablet 1     Sig: Take 1 tablet by mouth Daily.        Pharmacy where request should be sent: [id number] [place] [id number]  AT [facility] [address] [facility] - [phone] [facility] [phone]      Last office visit with prescribing clinician: 6/21/2023   Last telemedicine visit with prescribing clinician: Visit date not found   Next office visit with prescribing clinician: Visit date not found     Additional details provided by patient: OUT OF MEDICATION     Does the patient have less than a 3 day supply:  [x] Yes  [] No    Would you like a call back once the refill request has been completed: [] Yes [x] No    If the office needs to give you a call back, can they leave a voicemail: [] Yes [x] No    Sayda Uriarte Rep   10/26/23 11:12 EDT

## 2023-10-26 NOTE — TELEPHONE ENCOUNTER
Caller: Maria Eugenia Sandoval    Relationship: Self    Best call back number: [phone]     What is the medical concern/diagnosis: LUNG SCAN NEEDED TO BE REDONE     What specialty or service is being requested: CT SCAN OF LUNGS     What is the provider, practice or medical service name: [facility]       Any additional details: WAS TOLD THE SCAN NEEDED TO BE REPEATED IN OCTOBER AND NEEDS NEW REFERRAL PUT IN. LAST DONE IN JUNE OR JULY, PLEASE CALL WITH INFO

## 2023-10-26 NOTE — TELEPHONE ENCOUNTER
Called and spoke with patient and advised her that order was put in and faxed to [facility] and if she has not heard from them in a week to call me back and I will schedule her appointment. She verbalized understanding

## 2023-11-10 DIAGNOSIS — R93.89 ABNORMAL CT SCAN: ICD-10-CM

## 2023-11-10 DIAGNOSIS — R91.1 LUNG NODULE: ICD-10-CM

## 2023-11-14 ENCOUNTER — TELEPHONE (OUTPATIENT)
Dept: FAMILY MEDICINE CLINIC | Facility: CLINIC | Age: 68
End: 2023-11-14
Payer: MEDICARE

## 2023-11-14 DIAGNOSIS — R91.8 ABNORMAL CT LUNG SCREENING: Primary | ICD-10-CM

## 2023-11-14 NOTE — TELEPHONE ENCOUNTER
----- Message from MARÍA Lambert sent at 11/14/2023  8:23 AM EST -----  Please let Maria Eugenia know I have reviewed her chest CT. It is indicating she has some scarring in her left lower lobe. The radiologist stated a bronchoscopy may be considered, which is when they look at the lungs through a scope to get a better look at things. I am going to refer her to pulmonology for further evaluation. Her Ct did not mention any malignancy but she may have some chronic respiratory disease, such as emphysema from her years of smoking that needs further investigation by a specialist.

## 2023-12-05 ENCOUNTER — PATIENT OUTREACH (OUTPATIENT)
Dept: ONCOLOGY | Facility: CLINIC | Age: 68
End: 2023-12-05
Payer: MEDICARE

## 2023-12-05 ENCOUNTER — OFFICE VISIT (OUTPATIENT)
Dept: PULMONOLOGY | Facility: CLINIC | Age: 68
End: 2023-12-05
Payer: MEDICARE

## 2023-12-05 VITALS
BODY MASS INDEX: 28.24 KG/M2 | TEMPERATURE: 97.3 F | DIASTOLIC BLOOD PRESSURE: 76 MMHG | WEIGHT: 159.4 LBS | HEIGHT: 63 IN | HEART RATE: 72 BPM | SYSTOLIC BLOOD PRESSURE: 140 MMHG | OXYGEN SATURATION: 94 %

## 2023-12-05 DIAGNOSIS — J41.1 MUCOPURULENT CHRONIC BRONCHITIS: ICD-10-CM

## 2023-12-05 DIAGNOSIS — R91.1 LUNG NODULE: Primary | ICD-10-CM

## 2023-12-05 RX ORDER — ALBUTEROL SULFATE 90 UG/1
4 AEROSOL, METERED RESPIRATORY (INHALATION) ONCE
Status: COMPLETED | OUTPATIENT
Start: 2023-12-05 | End: 2023-12-05

## 2023-12-05 RX ORDER — ALBUTEROL SULFATE 90 UG/1
2 AEROSOL, METERED RESPIRATORY (INHALATION) EVERY 4 HOURS PRN
Qty: 18 G | Refills: 11 | Status: SHIPPED | OUTPATIENT
Start: 2023-12-05

## 2023-12-05 RX ADMIN — ALBUTEROL SULFATE 4 PUFF: 90 AEROSOL, METERED RESPIRATORY (INHALATION) at 11:00

## 2023-12-05 NOTE — PROGRESS NOTES
New Patient Pulmonary Office Visit      Patient Name: Maria Eugenia Sandoval    Referring Physician: Riya Edmonds APRN    Chief Complaint:    Chief Complaint   Patient presents with    Lung Nodule     New patient        History of Present Illness: Maria Eugenia Sandoval is a 68 y.o. female who is here today to establish care with Pulmonary.  Patient is a past medical history significant for hypertension, hypothyroidism, and tobacco abuse.  Who was referred to pulmonary for an abnormal CT scan after lung cancer screening CT scan.  Patient states that she has had CT scans in the past.  Has known that she has a left lower lobe nodule.  Denies any chest pain, nausea, fever, or chills.  She does currently have a cough been there about 3 weeks, but gradually getting better.  Has a long smoking history smoked for about 40 years in total.  No other acute complaints.    Review of Systems:   Review of Systems   Constitutional:  Negative for chills, fatigue and fever.   HENT:  Negative for congestion and voice change.    Eyes:  Negative for blurred vision.   Respiratory:  Negative for cough, shortness of breath and wheezing.    Cardiovascular:  Negative for chest pain.   Skin:  Negative for dry skin.   Hematological:  Negative for adenopathy.   Psychiatric/Behavioral:  Negative for agitation and depressed mood.        Past Medical History:   Past Medical History:   Diagnosis Date    Anxiety     Arthritis     Cataract     CTS (carpal tunnel syndrome) 2022    Disease of thyroid gland     Diverticulitis     Fracture of ankle 2007    Fracture, clavicle 2012    GERD (gastroesophageal reflux disease)     Heart disease     Hip arthrosis 2017    Wears contact lenses     Wears glasses        Past Surgical History:   Past Surgical History:   Procedure Laterality Date    ANKLE OPEN REDUCTION INTERNAL FIXATION      COLON RESECTION N/A 09/10/2018    Procedure: SIGMOID COLECTOMY, APPENDECTOMY, TAKEDOWN OF SPLENIC FEXURE,PROCTOSCOPY, UMBILICAL HERNIA  "REPAIR, DEBRIDEMENT & CULTURE OF PELVIC ABCESS,DIVERTING LOOP ILEOSTOMY, RIGHT URETEROLYSIS, BILATERAL SALPINGO OOPHORECTOMY;  Surgeon: Shant Lerner MD;  Location:  [facility];  Service: General    COLONOSCOPY      ENDOSCOPY      ILEOSTOMY CLOSURE N/A 02/11/2019    Procedure: LOOP ILEOSTOMY TAKEDOWN;  Surgeon: Shant Lerner MD;  Location:  [facility];  Service: General    NECK SURGERY  4/11/22    Laminoplasty    OOPHORECTOMY      TOTAL HIP ARTHROPLASTY Right 12/05/2017    Procedure: TOTAL HIP ARTHROPLASTY RIGHT;  Surgeon: Marek Talamantes MD;  Location:  [facility];  Service:     TUMOR REMOVAL         Family History:   Family History   Problem Relation Age of Onset    Osteoarthritis Mother     Breast cancer Neg Hx     Ovarian cancer Neg Hx        Social History:   Social History     Socioeconomic History    Marital status: Single   Tobacco Use    Smoking status: Every Day     Packs/day: 0.50     Years: 40.00     Additional pack years: 0.00     Total pack years: 20.00     Types: Cigarettes    Smokeless tobacco: Never   Substance and Sexual Activity    Alcohol use: Yes     Alcohol/week: 10.0 standard drinks of alcohol     Types: 10 Glasses of wine per week     Comment: 2 glass a wine a night     Drug use: No    Sexual activity: Not Currently     Partners: Male       Medications:     Current Outpatient Medications:     levothyroxine (Synthroid) 100 MCG tablet, Take 1 tablet by mouth Daily., Disp: 30 tablet, Rfl: 1    albuterol sulfate  (90 Base) MCG/ACT inhaler, Inhale 2 puffs Every 4 (Four) Hours As Needed for Wheezing., Disp: 18 g, Rfl: 11  No current facility-administered medications for this visit.    Allergies:   No Known Allergies    Physical Exam:  Vital Signs:   Vitals:    12/05/23 1054   BP: 140/76   BP Location: Left arm   Patient Position: Sitting   Cuff Size: Adult   Pulse: 72   Temp: 97.3 °F (36.3 °C)   TempSrc: Infrared   SpO2: 94%   Weight: 72.3 kg (159 lb 6.4 oz)   Height: 160 cm (62.99\") "       Physical Exam  Vitals and nursing note reviewed.   Constitutional:       General: She is not in acute distress.     Appearance: She is well-developed and normal weight. She is not ill-appearing or toxic-appearing.   HENT:      Head: Normocephalic and atraumatic.   Cardiovascular:      Rate and Rhythm: Normal rate and regular rhythm.      Pulses: Normal pulses.      Heart sounds: Normal heart sounds. No murmur heard.     No friction rub. No gallop.   Pulmonary:      Effort: Pulmonary effort is normal. No respiratory distress.      Breath sounds: Normal breath sounds. No wheezing, rhonchi or rales.   Musculoskeletal:      Right lower leg: No edema.      Left lower leg: No edema.   Skin:     General: Skin is warm and dry.   Neurological:      Mental Status: She is alert and oriented to person, place, and time.         Immunization History   Administered Date(s) Administered    COVID-19 (PFIZER) Purple Cap Monovalent 03/04/2021, 03/25/2021    Fluzone High-Dose 65+yrs 10/04/2022    Influenza, Unspecified 10/10/2023    Tdap 06/17/2020       Results Review:   - I personally reviewed the pts imaging from CT scans of the chest from 2021 and November 2023, which shows initially a left lower lobe nodule by my measurement 7.7 mm x 5.8 mm, follow-up CT scan was done in 2023 which then showed that the area is now 8.2 x 6.4 mm.  No other significant findings on the CT scan.  I also reviewed the patient's CT report from [facility] in 2020 that mentions a left lower lobe nodule measuring roughly 7 mm that been present since 2017.  - I personally reviewed the pts PFT from 12/5/2023 shows mild obstruction without restriction, significant air trapping and a normal DLCO.  - I personally reviewed the pts chart with regards to evaluation by the patient's PCP.    Assessment / Plan:   Diagnoses and all orders for this visit:    1. 8 mm left lower lobe noncalcified nodule (Primary)  -Patient is nodule is not changed much since 2021,  may be a millimeter at most.  She been having a cough recently, very likely the changes is more related to mucus.  Given that this has been there for many years I plan to get the CT scans from [facility] dating back to 2017 to compare.  In addition to that plan for repeat CT scan in 6 months.  I explained her that it is always possible an old nodule can start to grow from a scar, and it be malignant this is usually squamous cell carcinoma.  But at this point the nodule is too small to biopsy and also too small for PET scan.    -Low-dose CT scan ordered for 6 months.    2. Mucopurulent chronic bronchitis  -Patient has gold stage 2 class A COPD  -Start albuterol every 4 hours as needed for medication management  -latest PFTs as noted above  -Patient counseled on monitoring for COPD exacerbation and treatment plan  -Recommend 30 minutes cardiovascular size per day    Follow Up:   Return in about 6 months (around 6/5/2024) for CT Chest with next visit.     RUSSEL Armenta, DO  Pulmonary and Critical Care Medicine  Note Electronically Signed    Part of this note may be an electronic transcription/translation of spoken language to printed text using the Dragon Dictation System.

## 2023-12-07 ENCOUNTER — PATIENT ROUNDING (BHMG ONLY) (OUTPATIENT)
Dept: PULMONOLOGY | Facility: CLINIC | Age: 68
End: 2023-12-07
Payer: MEDICARE

## 2023-12-07 NOTE — PROGRESS NOTES
December 7, 2023    Hello, may I speak with Maria Eugenia Sandoval?    My name is TALAT       I am  with [facility]  [address] [place] [id number] [phone].    Before we get started may I verify your date of birth? 1955    I am calling to officially welcome you to our practice and ask about your recent visit. Is this a good time to talk? yes    Tell me about your visit with us. What things went well?  YES THING WERE GREAT VERY PLEASANT EXPERIENCE        We're always looking for ways to make our patients' experiences even better. Do you have recommendations on ways we may improve?  no    Overall were you satisfied with your first visit to our practice? yes       I appreciate you taking the time to speak with me today. Is there anything else I can do for you? no      Thank you, and have a great day.

## 2023-12-14 RX ORDER — LEVOTHYROXINE SODIUM 0.1 MG/1
100 TABLET ORAL DAILY
Qty: 90 TABLET | OUTPATIENT
Start: 2023-12-14

## 2023-12-14 RX ORDER — LEVOTHYROXINE SODIUM 0.1 MG/1
100 TABLET ORAL DAILY
Qty: 30 TABLET | Refills: 1 | Status: SHIPPED | OUTPATIENT
Start: 2023-12-14

## 2023-12-14 NOTE — TELEPHONE ENCOUNTER
Caller: Maria Eugenia Sandoval    Relationship: Self    Best call back number: [phone]     Requested Prescriptions:   Requested Prescriptions     Pending Prescriptions Disp Refills    levothyroxine (Synthroid) 100 MCG tablet 30 tablet 1     Sig: Take 1 tablet by mouth Daily.        Pharmacy where request should be sent: [id number] [place] [id number]  AT [facility] [address] [facility] - [phone] [facility] [phone]      Last office visit with prescribing clinician: 6/21/2023   Last telemedicine visit with prescribing clinician: Visit date not found   Next office visit with prescribing clinician: Visit date not found     Additional details provided by patient: MARIA EUGENIA HAS 10 DAYS LEFT BUT WILL BE GOING OUT OF [place] [id number] FOR A WEEK.    Does the patient have less than a 3 day supply:  [] Yes  [] No    Would you like a call back once the refill request has been completed: [x] Yes [] No    If the office needs to give you a call back, can they leave a voicemail: [] Yes [] No    Sayda Cervantes Rep   12/14/23 15:13 EST

## 2024-02-12 RX ORDER — LEVOTHYROXINE SODIUM 0.1 MG/1
100 TABLET ORAL DAILY
Qty: 90 TABLET | Refills: 0 | Status: SHIPPED | OUTPATIENT
Start: 2024-02-12

## 2024-05-09 RX ORDER — LEVOTHYROXINE SODIUM 0.1 MG/1
100 TABLET ORAL DAILY
Qty: 90 TABLET | Refills: 0 | Status: SHIPPED | OUTPATIENT
Start: 2024-05-09

## 2024-06-05 ENCOUNTER — HOSPITAL ENCOUNTER (OUTPATIENT)
Dept: CT IMAGING | Facility: HOSPITAL | Age: 69
Discharge: HOME | End: 2024-06-05
Admitting: INTERNAL MEDICINE
Payer: MEDICARE

## 2024-06-05 DIAGNOSIS — R91.1 LUNG NODULE: ICD-10-CM

## 2024-06-05 PROCEDURE — 71250 CT THORAX DX C-: CPT

## 2024-08-09 RX ORDER — LEVOTHYROXINE SODIUM 0.1 MG/1
100 TABLET ORAL DAILY
Qty: 90 TABLET | Refills: 0 | OUTPATIENT
Start: 2024-08-09

## 2024-08-09 NOTE — TELEPHONE ENCOUNTER
Name: Maria Eugenia Sandoval      Relationship: Self      Best Callback Number: [phone]       HUB PROVIDED THE RELAY MESSAGE FROM THE OFFICE      PATIENT: SCHEDULED PER NOTE    ADDITIONAL INFORMATION:  LABS MAY BE NEEDED FOR MEDICATION BEFORE APPOINTMENT. PLEASE CALL IF THIS IS NEEDED AND SCHEDULE. SHE'S NEEDING HER THYROID MEDICAITOB

## 2024-08-09 NOTE — TELEPHONE ENCOUNTER
Rx denied patient has not been seen in our office since 06/2023. Called and left a voicemail for her to schedule a physical.      HUB please schedule a physical if she calls back

## 2024-08-15 ENCOUNTER — OFFICE VISIT (OUTPATIENT)
Dept: FAMILY MEDICINE CLINIC | Facility: CLINIC | Age: 69
End: 2024-08-15
Payer: MEDICARE

## 2024-08-15 VITALS
HEART RATE: 74 BPM | WEIGHT: 157 LBS | DIASTOLIC BLOOD PRESSURE: 70 MMHG | HEIGHT: 63 IN | BODY MASS INDEX: 27.82 KG/M2 | TEMPERATURE: 97.3 F | OXYGEN SATURATION: 97 % | RESPIRATION RATE: 18 BRPM | SYSTOLIC BLOOD PRESSURE: 118 MMHG

## 2024-08-15 DIAGNOSIS — Z12.31 ENCOUNTER FOR SCREENING MAMMOGRAM FOR MALIGNANT NEOPLASM OF BREAST: ICD-10-CM

## 2024-08-15 DIAGNOSIS — Z82.0 FAMILY HISTORY OF ALZHEIMER'S DISEASE: ICD-10-CM

## 2024-08-15 DIAGNOSIS — Z13.820 SCREENING FOR OSTEOPOROSIS: ICD-10-CM

## 2024-08-15 DIAGNOSIS — R91.1 LEFT LOWER LOBE PULMONARY NODULE: ICD-10-CM

## 2024-08-15 DIAGNOSIS — Z00.00 MEDICARE ANNUAL WELLNESS VISIT, SUBSEQUENT: ICD-10-CM

## 2024-08-15 DIAGNOSIS — I10 PRIMARY HYPERTENSION: Primary | ICD-10-CM

## 2024-08-15 DIAGNOSIS — G99.2 MYELOPATHY CONCURRENT WITH AND DUE TO SPINAL STENOSIS OF CERVICAL REGION: ICD-10-CM

## 2024-08-15 DIAGNOSIS — E03.9 ACQUIRED HYPOTHYROIDISM: ICD-10-CM

## 2024-08-15 DIAGNOSIS — Z72.0 TOBACCO ABUSE: ICD-10-CM

## 2024-08-15 DIAGNOSIS — Z96.641 STATUS POST TOTAL REPLACEMENT OF RIGHT HIP: ICD-10-CM

## 2024-08-15 DIAGNOSIS — G56.03 BILATERAL CARPAL TUNNEL SYNDROME: ICD-10-CM

## 2024-08-15 DIAGNOSIS — M48.02 MYELOPATHY CONCURRENT WITH AND DUE TO SPINAL STENOSIS OF CERVICAL REGION: ICD-10-CM

## 2024-08-15 DIAGNOSIS — Z09 ENCOUNTER FOR FOLLOW-UP EXAMINATION AFTER COMPLETED TREATMENT FOR CONDITIONS OTHER THAN MALIGNANT NEOPLASM: ICD-10-CM

## 2024-08-15 NOTE — PROGRESS NOTES
The ABCs of the Annual Wellness Visit  Subsequent Medicare Wellness Visit    Chief Complaint   Patient presents with    Medicare Wellness-subsequent      Subjective    History of Present Illness:  Maria Eugenia Sandoval is a 69 y.o. female who presents for a Subsequent Medicare Wellness Visit.    The following portions of the patient's history were reviewed and   updated as appropriate: allergies, current medications, past family history, past medical history, past social history, past surgical history, and problem list.    Compared to one year ago, the patient feels her physical   health is the same.    Compared to one year ago, the patient feels her mental   health is the same.    Recent Hospitalizations:  She was not admitted to the hospital during the last year.       Current Medical Providers:  Patient Care Team:  Riya Edmonds APRN as PCP - General (Family Medicine)  Gia Hedrick RN as Nurse Navigator    Outpatient Medications Prior to Visit   Medication Sig Dispense Refill    albuterol sulfate  (90 Base) MCG/ACT inhaler Inhale 2 puffs Every 4 (Four) Hours As Needed for Wheezing. 18 g 11    levothyroxine (SYNTHROID, LEVOTHROID) 100 MCG tablet TAKE 1 TABLET BY MOUTH DAILY 90 tablet 0     No facility-administered medications prior to visit.       No opioid medication identified on active medication list. I have reviewed chart for other potential  high risk medication/s and harmful drug interactions in the elderly.        Aspirin is not on active medication list.  Aspirin use is not indicated based on review of current medical condition/s. Risk of harm outweighs potential benefits.  .    Patient Active Problem List   Diagnosis    Primary osteoarthritis of right hip    Status post total replacement of right hip    Hypothyroid    Tobacco abuse    COVID-19    Osteoporosis    Bilateral carpal tunnel syndrome    Primary hypertension    Myelopathy concurrent with and due to spinal stenosis of cervical region     "Screening for osteoporosis    Left lower lobe pulmonary nodule    Family history of Alzheimer's disease    Medicare annual wellness visit, subsequent     Advance Care Planning  Advance Directive is on file.  ACP discussion was held with the patient during this visit. Patient has an advance directive in EMR which is still valid.     Review of Systems   Constitutional:  Negative for appetite change, diaphoresis, fatigue and fever.   Eyes:  Negative for visual disturbance.   Respiratory:  Negative for cough, choking, chest tightness, shortness of breath and wheezing.    Cardiovascular:  Negative for chest pain, palpitations and leg swelling.   Gastrointestinal:  Negative for abdominal pain, constipation, diarrhea, nausea and vomiting.   Endocrine: Negative for polydipsia and polyuria.   Genitourinary:  Negative for difficulty urinating, frequency, hematuria and urgency.   Musculoskeletal:  Negative for arthralgias and myalgias.   Skin:  Negative for rash.   Neurological:  Positive for numbness. Negative for dizziness, weakness and light-headedness.   Hematological:  Does not bruise/bleed easily.   Psychiatric/Behavioral:  Negative for agitation.         Objective    Vitals:    08/15/24 1107   BP: 118/70   BP Location: Left arm   Patient Position: Sitting   Cuff Size: Adult   Pulse: 74   Resp: 18   Temp: 97.3 °F (36.3 °C)   TempSrc: Temporal   SpO2: 97%   Weight: 71.2 kg (157 lb)   Height: 160 cm (62.99\")     Estimated body mass index is 27.82 kg/m² as calculated from the following:    Height as of this encounter: 160 cm (62.99\").    Weight as of this encounter: 71.2 kg (157 lb).    BMI is >= 25 and <30. (Overweight) The following options were offered after discussion;: exercise counseling/recommendations and nutrition counseling/recommendations      Does the patient have evidence of cognitive impairment? No    Physical Exam  Vitals reviewed.   Constitutional:       Appearance: Normal appearance.   HENT:      Head: " Normocephalic and atraumatic.      Right Ear: Tympanic membrane, ear canal and external ear normal.      Left Ear: Tympanic membrane, ear canal and external ear normal.      Nose: Nose normal.      Mouth/Throat:      Mouth: Mucous membranes are moist.      Pharynx: Oropharynx is clear.   Eyes:      Conjunctiva/sclera: Conjunctivae normal.      Pupils: Pupils are equal, round, and reactive to light.   Cardiovascular:      Rate and Rhythm: Normal rate and regular rhythm.      Pulses: Normal pulses.      Heart sounds: Normal heart sounds.   Pulmonary:      Effort: Pulmonary effort is normal.      Breath sounds: Normal breath sounds.   Abdominal:      General: Bowel sounds are normal.      Palpations: Abdomen is soft.   Musculoskeletal:         General: Normal range of motion.      Cervical back: Neck supple.   Skin:     General: Skin is warm and dry.      Capillary Refill: Capillary refill takes less than 2 seconds.   Neurological:      Mental Status: She is alert and oriented to person, place, and time.   Psychiatric:         Mood and Affect: Mood normal.         Behavior: Behavior normal.         Thought Content: Thought content normal.         Judgment: Judgment normal.                 HEALTH RISK ASSESSMENT    Smoking Status:  Social History     Tobacco Use   Smoking Status Every Day    Current packs/day: 0.50    Average packs/day: 0.5 packs/day for 40.0 years (20.0 ttl pk-yrs)    Types: Cigarettes   Smokeless Tobacco Never     Alcohol Consumption:  Social History     Substance and Sexual Activity   Alcohol Use Yes    Alcohol/week: 10.0 standard drinks of alcohol    Types: 10 Glasses of wine per week    Comment: 2 glass a wine a night      Fall Risk Screen:    STEADI Fall Risk Assessment was completed, and patient is at LOW risk for falls.Assessment completed on:8/15/2024    Depression Screenin/15/2024    11:08 AM   PHQ-2/PHQ-9 Depression Screening   Little Interest or Pleasure in Doing Things 0-->not at  all   Feeling Down, Depressed or Hopeless 0-->not at all   PHQ-9: Brief Depression Severity Measure Score 0       Health Habits and Functional and Cognitive Screenin/15/2024    11:09 AM   Functional & Cognitive Status   Do you have difficulty preparing food and eating? No   Do you have difficulty bathing yourself, getting dressed or grooming yourself? No   Do you have difficulty using the toilet? No   Do you have difficulty moving around from place to place? No   Do you have trouble with steps or getting out of a bed or a chair? No   Current Diet Other   Dental Exam Up to date   Eye Exam Up to date   Exercise (times per week) 3 times per week   Current Exercises Include Walking;House Cleaning   Do you need help using the phone?  No   Are you deaf or do you have serious difficulty hearing?  No   Do you need help to go to places out of walking distance? Yes   Do you need help shopping? No   Do you need help preparing meals?  No   Do you need help with housework?  No   Do you need help with laundry? No   Do you need help taking your medications? No   Do you need help managing money? No   Do you ever drive or ride in a car without wearing a seat belt? No   Have you felt unusual stress, anger or loneliness in the last month? No   Who do you live with? Child   If you need help, do you have trouble finding someone available to you? No   Have you been bothered in the last four weeks by sexual problems? No   Do you have difficulty concentrating, remembering or making decisions? No       Age-appropriate Screening Schedule:  Refer to the list below for future screening recommendations based on patient's age, sex and/or medical conditions. Orders for these recommended tests are listed in the plan section. The patient has been provided with a written plan.    Health Maintenance   Topic Date Due    ANNUAL WELLNESS VISIT  2024    BMI FOLLOWUP  2024    INFLUENZA VACCINE  2024    MAMMOGRAM  2024     DXA SCAN  08/24/2024    Pneumococcal Vaccine 65+ (1 of 2 - PCV) 12/05/2024 (Originally 6/2/1961)    COVID-19 Vaccine (3 - 2023-24 season) 08/01/2025 (Originally 9/1/2023)    ZOSTER VACCINE (1 of 2) 08/01/2025 (Originally 6/2/2007)    LUNG CANCER SCREENING  06/05/2025    PAP SMEAR  04/30/2026    COLORECTAL CANCER SCREENING  06/12/2028    TDAP/TD VACCINES (2 - Td or Tdap) 06/17/2030    HEPATITIS C SCREENING  Completed              Assessment & Plan   CMS Preventative Services Quick Reference  Risk Factors Identified During Encounter  Tobacco Use/Dependance Risk (use dotphrase .tobaccocessation for documentation)  The above risks/problems have been discussed with the patient.  Follow up actions/plans if indicated are seen below in the Assessment/Plan Section.  Pertinent information has been shared with the patient in the After Visit Summary.    Diagnoses and all orders for this visit:    1. Primary hypertension (Primary)  Assessment & Plan:  Excellent control, 118/70 in office today.  He had recently been taking Dyazide but stopped taking the medication as Dr. Rizzo told her she no longer needed it.  Not currently requiring any antihypertensive medication       2. Acquired hypothyroidism  Assessment & Plan:  Currently utilizing levothyroxine 100 mcg once daily. Rechecking TSH with T4 today     Orders:  -     TSH Rfx On Abnormal To Free T4; Future    3. Medicare annual wellness visit, subsequent  Assessment & Plan:  Patient presents today for annual medicare wellness visit. She is followed by chronic conditions including hypothyroid, HTN, left lower lobe pulmonary nodule, osteoporosis and history of cervicalgia. Patient is up to date on colon cancer and breast cancer screenings. She continues to smoke but has decreased that amount. Continue to be followed by pulmonary for monitoring of a stable 8mm left lower lobe lung nodule. BP well controlled, 118/70 in office. Patient's mother suffered from Alzheimer's disease,  she just lost her sister to the same. After the death of her sister she tells me they donated her brain for dissection and found not only was she suffering from alzheimer's but ALS. Patient and I have discussed referral to  memory center but she declines at this time. She herself having no issues with memory or confusion. Patient continues to eat a healthy and exercise with aerobic walking at least three days/week. She has no complaints or concerns today.     Orders:  -     Lipid Panel; Future  -     Comprehensive Metabolic Panel; Future  -     CBC & Differential; Future    4. Encounter for screening mammogram for malignant neoplasm of breast  -     Mammo Screening Digital Tomosynthesis Bilateral With CAD; Future    5. Screening for osteoporosis  -     DEXA Bone Density Axial    6. Encounter for follow-up examination after completed treatment for conditions other than malignant neoplasm  -     DEXA Bone Density Axial    7. Bilateral carpal tunnel syndrome  Assessment & Plan:  Patient has undergone surgical intervention for bilateral carpal tunnel. She has ongoing significant numbness of the left upper extremity. Followed by neurosurgery at , Dr. Hensley      8. Myelopathy concurrent with and due to spinal stenosis of cervical region  Assessment & Plan:  Patient underwent surgical intervention 1 year ago for C3-C7 spinal canal stenosis.  He underwent C3-T1 laminoplasty.  The procedure was tolerated well and she has received good benefit       9. Tobacco abuse  Assessment & Plan:  0.25 pack/day smoker for the last 40 years. Participates in yearly low-dose Ct lung cancer screening.       10. Status post total replacement of right hip    11. Left lower lobe pulmonary nodule  Assessment & Plan:  8mm calcified nodule, remains stable. Followed by pulmonary, Dr. Armenta      12. Family history of Alzheimer's disease  Assessment & Plan:  Patient's mother suffered from Alzheimer's disease, she just lost her sister to the  same. After the death of her sister she tells me they donated her brain for dissection and found not only was she suffering from alzheimer's but ALS. Patient and I have discussed referral to [facility] but she declines at this time. She herself having no issues with memory or confusion          Follow Up:   Return in about 1 year (around 8/15/2025) for Medicare Wellness.     An After Visit Summary and PPPS were made available to the patient.

## 2024-08-18 PROBLEM — Z82.0 FAMILY HISTORY OF ALZHEIMER'S DISEASE: Status: ACTIVE | Noted: 2024-08-18

## 2024-08-18 PROBLEM — Z00.00 MEDICARE ANNUAL WELLNESS VISIT, SUBSEQUENT: Status: ACTIVE | Noted: 2024-08-18

## 2024-08-18 PROBLEM — R91.1 LEFT LOWER LOBE PULMONARY NODULE: Status: ACTIVE | Noted: 2024-08-18

## 2024-08-18 NOTE — ASSESSMENT & PLAN NOTE
Excellent control, 118/70 in office today.  He had recently been taking Dyazide but stopped taking the medication as Dr. Rizzo told her she no longer needed it.  Not currently requiring any antihypertensive medication

## 2024-08-18 NOTE — ASSESSMENT & PLAN NOTE
Patient presents today for annual medicare wellness visit. She is followed by chronic conditions including hypothyroid, HTN, left lower lobe pulmonary nodule, osteoporosis and history of cervicalgia. Patient is up to date on colon cancer and breast cancer screenings. She continues to smoke but has decreased that amount. Continue to be followed by pulmonary for monitoring of a stable 8mm left lower lobe lung nodule. BP well controlled, 118/70 in office. Patient's mother suffered from Alzheimer's disease, she just lost her sister to the same. After the death of her sister she tells me they donated her brain for dissection and found not only was she suffering from alzheimer's but ALS. Patient and I have discussed referral to  memory center but she declines at this time. She herself having no issues with memory or confusion. Patient continues to eat a healthy and exercise with aerobic walking at least three days/week. She has no complaints or concerns today.

## 2024-08-18 NOTE — ASSESSMENT & PLAN NOTE
0.25 pack/day smoker for the last 40 years. Participates in yearly low-dose Ct lung cancer screening.

## 2024-08-18 NOTE — ASSESSMENT & PLAN NOTE
Patient underwent surgical intervention 1 year ago for C3-C7 spinal canal stenosis.  He underwent C3-T1 laminoplasty.  The procedure was tolerated well and she has received good benefit

## 2024-08-18 NOTE — ASSESSMENT & PLAN NOTE
Patient's mother suffered from Alzheimer's disease, she just lost her sister to the same. After the death of her sister she tells me they donated her brain for dissection and found not only was she suffering from alzheimer's but ALS. Patient and I have discussed referral to [facility] but she declines at this time. She herself having no issues with memory or confusion

## 2024-08-18 NOTE — ASSESSMENT & PLAN NOTE
Patient has undergone surgical intervention for bilateral carpal tunnel. She has ongoing significant numbness of the left upper extremity. Followed by neurosurgery at , Dr. Hensley

## 2024-08-20 ENCOUNTER — LAB (OUTPATIENT)
Dept: LAB | Facility: HOSPITAL | Age: 69
End: 2024-08-20
Payer: MEDICARE

## 2024-08-20 PROCEDURE — 84443 ASSAY THYROID STIM HORMONE: CPT | Performed by: NURSE PRACTITIONER

## 2024-08-20 PROCEDURE — 80053 COMPREHEN METABOLIC PANEL: CPT | Performed by: NURSE PRACTITIONER

## 2024-08-20 PROCEDURE — 85025 COMPLETE CBC W/AUTO DIFF WBC: CPT | Performed by: NURSE PRACTITIONER

## 2024-08-20 PROCEDURE — 80061 LIPID PANEL: CPT | Performed by: NURSE PRACTITIONER

## 2024-08-21 DIAGNOSIS — E78.2 MIXED HYPERLIPIDEMIA: Primary | ICD-10-CM

## 2024-08-21 RX ORDER — ATORVASTATIN CALCIUM 10 MG/1
10 TABLET, FILM COATED ORAL DAILY
Qty: 90 TABLET | Refills: 1 | Status: SHIPPED | OUTPATIENT
Start: 2024-08-21

## 2024-08-22 DIAGNOSIS — Z12.31 ENCOUNTER FOR SCREENING MAMMOGRAM FOR MALIGNANT NEOPLASM OF BREAST: Primary | ICD-10-CM

## 2024-08-22 DIAGNOSIS — R92.2 INCONCLUSIVE MAMMOGRAM: ICD-10-CM

## 2024-08-23 RX ORDER — LEVOTHYROXINE SODIUM 0.1 MG/1
100 TABLET ORAL DAILY
Qty: 90 TABLET | Refills: 0 | OUTPATIENT
Start: 2024-08-23

## 2024-08-23 RX ORDER — LEVOTHYROXINE SODIUM 0.1 MG/1
100 TABLET ORAL DAILY
Qty: 90 TABLET | Refills: 0 | Status: SHIPPED | OUTPATIENT
Start: 2024-08-23

## 2024-08-23 NOTE — TELEPHONE ENCOUNTER
Caller: Maria Eugenia Sandoval    Relationship: Self    Best call back number: [phone]    Requested Prescriptions:   Requested Prescriptions     Pending Prescriptions Disp Refills    levothyroxine (SYNTHROID, LEVOTHROID) 100 MCG tablet 90 tablet 0     Sig: Take 1 tablet by mouth Daily.        Ph[facility] be [id number] [place] [id number] [facility] [phone] [facility] [phone]      Last office visit with prescribing clinician: 8/15/2024   Last telemedicine visit with prescribing clinician: Visit date not found   Next office visit with prescribing clinician: Visit date not found     Additional details provided by patient: PATIENT NEEDS REFILL     Does the patient have less than a 3 day supply:  [x] Yes  [] No    Would you like a call back once the refill request has been completed: [] Yes [x] No    If the office needs to give you a call back, can they leave a voicemail: [] Yes [x] No    Sayda Bailey Rep   08/23/24 10:03 EDT

## 2024-09-09 ENCOUNTER — HOSPITAL ENCOUNTER (OUTPATIENT)
Dept: BONE DENSITY | Facility: HOSPITAL | Age: 69
Discharge: HOME | End: 2024-09-09
Admitting: NURSE PRACTITIONER
Payer: MEDICARE

## 2024-09-09 ENCOUNTER — OFFICE VISIT (OUTPATIENT)
Dept: INTERNAL MEDICINE | Facility: CLINIC | Age: 69
End: 2024-09-09
Payer: MEDICARE

## 2024-09-09 VITALS
HEART RATE: 72 BPM | SYSTOLIC BLOOD PRESSURE: 136 MMHG | OXYGEN SATURATION: 96 % | BODY MASS INDEX: 27.89 KG/M2 | DIASTOLIC BLOOD PRESSURE: 72 MMHG | HEIGHT: 63 IN | TEMPERATURE: 97.8 F | WEIGHT: 157.4 LBS

## 2024-09-09 DIAGNOSIS — Z72.0 TOBACCO ABUSE: ICD-10-CM

## 2024-09-09 DIAGNOSIS — Z76.89 ENCOUNTER TO ESTABLISH CARE: Primary | ICD-10-CM

## 2024-09-09 DIAGNOSIS — M81.0 AGE RELATED OSTEOPOROSIS, UNSPECIFIED PATHOLOGICAL FRACTURE PRESENCE: ICD-10-CM

## 2024-09-09 DIAGNOSIS — Z00.00 ROUTINE ADULT HEALTH MAINTENANCE: ICD-10-CM

## 2024-09-09 DIAGNOSIS — Z82.0 FAMILY HISTORY OF ALZHEIMER'S DISEASE: ICD-10-CM

## 2024-09-09 DIAGNOSIS — E03.9 ACQUIRED HYPOTHYROIDISM: ICD-10-CM

## 2024-09-09 DIAGNOSIS — E78.2 MIXED HYPERLIPIDEMIA: ICD-10-CM

## 2024-09-09 DIAGNOSIS — M81.0 AGE-RELATED OSTEOPOROSIS WITHOUT CURRENT PATHOLOGICAL FRACTURE: ICD-10-CM

## 2024-09-09 PROBLEM — E78.00 PURE HYPERCHOLESTEROLEMIA: Status: ACTIVE | Noted: 2024-09-09

## 2024-09-09 PROBLEM — E78.00 PURE HYPERCHOLESTEROLEMIA: Status: RESOLVED | Noted: 2024-09-09 | Resolved: 2024-09-09

## 2024-09-09 PROCEDURE — 99214 OFFICE O/P EST MOD 30 MIN: CPT | Performed by: STUDENT IN AN ORGANIZED HEALTH CARE EDUCATION/TRAINING PROGRAM

## 2024-09-09 PROCEDURE — 1160F RVW MEDS BY RX/DR IN RCRD: CPT | Performed by: STUDENT IN AN ORGANIZED HEALTH CARE EDUCATION/TRAINING PROGRAM

## 2024-09-09 PROCEDURE — 3078F DIAST BP <80 MM HG: CPT | Performed by: STUDENT IN AN ORGANIZED HEALTH CARE EDUCATION/TRAINING PROGRAM

## 2024-09-09 PROCEDURE — 1159F MED LIST DOCD IN RCRD: CPT | Performed by: STUDENT IN AN ORGANIZED HEALTH CARE EDUCATION/TRAINING PROGRAM

## 2024-09-09 PROCEDURE — 1126F AMNT PAIN NOTED NONE PRSNT: CPT | Performed by: STUDENT IN AN ORGANIZED HEALTH CARE EDUCATION/TRAINING PROGRAM

## 2024-09-09 PROCEDURE — 77080 DXA BONE DENSITY AXIAL: CPT

## 2024-09-09 PROCEDURE — 3075F SYST BP GE 130 - 139MM HG: CPT | Performed by: STUDENT IN AN ORGANIZED HEALTH CARE EDUCATION/TRAINING PROGRAM

## 2024-09-09 NOTE — ASSESSMENT & PLAN NOTE
Chronic; smokes 1/4 ppd for >40 years. Has tried gums, lozenges and patches in the past. Has not tried Chantix but not interested at this time. Follows with pulm for a stable lung nodule. Has chronic cough but no other respiratory complaints.

## 2024-09-09 NOTE — ASSESSMENT & PLAN NOTE
UTD mammogram, colonoscopy, pap smear. Discussed immunizations that are appropriate for age - patient has opted to have these completed at local pharmacy. DEXA done this morning; pending results.

## 2024-09-09 NOTE — PATIENT INSTRUCTIONS
Vaccines Needed:  Shingrix - 2-dose series for Shingles  Pneumovax - 1 dose   Flu Vaccine- annually     Supplements for Osteoporosis:   Vitamin D: Vitamin D3 800-1000mg daily  Calcium: 1000mg daily    Start taking Lipitor (atorvastatin) at night before bed

## 2024-09-09 NOTE — ASSESSMENT & PLAN NOTE
Patient expressing concern over family history. No recent memory changes or high risk behavior that would be consistent with early dementia. Patient is interested in referral to memory clinic.

## 2024-09-09 NOTE — ASSESSMENT & PLAN NOTE
Reviewed medical history, family history, social history and addressed chief complaint. Reviewed mediation and dosage.

## 2024-09-09 NOTE — ASSESSMENT & PLAN NOTE
DEXA from 2022 reviewed - osteoporosis of left hip. Not on treatment. We discussed starting OTC vitamin D and calcium and including more weight-based training into daily routine. We will have a follow-up visit in 2-4 weeks to discuss results of updated DEXA (done today) and discuss treatment options.

## 2024-09-09 NOTE — PROGRESS NOTES
Office Note     Name: Maria Eugenia Sandoval    : 1955     MRN: [id number]     Chief Complaint  Hypothyroidism (Follow up on labs from previous office)    Subjective     History of Present Illness:  Maria Eugenia Sandoval is a 69 y.o. female who presents today for establishing care in [place].    CC:     Interested in memory study at   Has not noticed any serious memory impairment     Health Maintenance:  >66 yo female:   Breast Cancer Screening: upcoming Set 23  Cervical Cancer Screening: all normal; reportedly normal in . No history of abnormal or HPV/   Colon Cancer Screening: UTD   DEXA Scan: Done this morning  Shingles Vaccine: Discussed  Pneumonia Vaccine: Discussed   COVID Vaccine:  not intersted    Surgeries:   Hospitalizations: none recently; neck surgery about 2 years ago. Attributes neck pain to COVID vaccine.   Surgeries; left carpal tunnel; cervical spine procedures; diverticulitis surgeries; appendectomy; removed right ovary.   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   Children: two sons; one living in [place]   Housing: living with youngest son   Tobacco: quarter a pack a day; follows with pulmonology. Has a chronic cough.   EtOH: drinks 1-2 glasses of wine most nights   Other drug use: none   Exercise: walks and uses elliptical  Bad balance - used a cane in the past when heavy walking      Family Medication History:  Mother: Dementia; passed at age 84.   Father: Age 92. Doing well.   MGM:  MGF:   PGM:   PGF:   Siblings: Sister passed of frontal temporal dementia; donated brain to [facility] and resultantly had ALS. Sister was 71 yo. She was diagnosed at age 70. Memory impairment came on suddenly.   Children: Two sons. One healthy, one suffered TBI but is fairly functional.     The 10-year ASCVD risk score (David  JOIE, et al., 2019) is: 12.5%    Values used to calculate the score:      Age: 69 years      Sex: Female      Is Non- : No      Diabetic: No      Tobacco smoker: Yes      Systolic Blood Pressure: 136 mmHg      Is BP treated: No      HDL Cholesterol: 75 mg/dL      Total Cholesterol: 218 mg/dL    Review of Systems:   Review of Systems    Past Medical History:   Past Medical History:   Diagnosis Date    Anxiety     Arthritis     Cataract     CTS (carpal tunnel syndrome) 2022    Disease of thyroid gland     Diverticulitis     Fracture of ankle 2007    Fracture, clavicle 2012    GERD (gastroesophageal reflux disease)     Heart disease     Hip arthrosis 2017    Screening for osteoporosis 8/15/2024    Wears contact lenses     Wears glasses        Past Surgical History:   Past Surgical History:   Procedure Laterality Date    ANKLE OPEN REDUCTION INTERNAL FIXATION      COLON RESECTION N/A 09/10/2018    Procedure: SIGMOID COLECTOMY, APPENDECTOMY, TAKEDOWN OF SPLENIC FEXURE,PROCTOSCOPY, UMBILICAL HERNIA REPAIR, DEBRIDEMENT & CULTURE OF PELVIC ABCESS,DIVERTING LOOP ILEOSTOMY, RIGHT URETEROLYSIS, BILATERAL SALPINGO OOPHORECTOMY;  Surgeon: Shant Lerner MD;  Location:  [facility];  Service: General    COLONOSCOPY      ENDOSCOPY      ILEOSTOMY CLOSURE N/A 02/11/2019    Procedure: LOOP ILEOSTOMY TAKEDOWN;  Surgeon: Shant Lerner MD;  Location:  [facility];  Service: General    NECK SURGERY  4/11/22    Laminoplasty    OOPHORECTOMY      TOTAL HIP ARTHROPLASTY Right 12/05/2017    Procedure: TOTAL HIP ARTHROPLASTY RIGHT;  Surgeon: Marek Talamantes MD;  Location:  [facility];  Service:     TUMOR REMOVAL         Family History:   Family History   Problem Relation Age of Onset    Osteoarthritis Mother     Breast cancer Neg Hx     Ovarian cancer Neg Hx        Social History:   Social History     Socioeconomic History    Marital status: Single   Tobacco Use    Smoking status: Every Day     Current packs/day: 0.50      "Average packs/day: 0.5 packs/day for 40.0 years (20.0 ttl pk-yrs)     Types: Cigarettes    Smokeless tobacco: Never    Tobacco comments:     Wish I had never started.   Vaping Use    Vaping status: Never Used   Substance and Sexual Activity    Alcohol use: Yes     Alcohol/week: 10.0 standard drinks of alcohol     Types: 10 Glasses of wine per week     Comment: 2 glass a wine a night     Drug use: No    Sexual activity: Not Currently     Partners: Male     Birth control/protection: None       Immunizations:   Immunization History   Administered Date(s) Administered    COVID-19 (PFIZER) Purple Cap Monovalent 03/04/2021, 03/25/2021    Fluzone High-Dose 65+yrs 10/04/2022    Influenza, Unspecified 10/10/2023    Tdap 06/17/2020        Medications:     Current Outpatient Medications:     albuterol sulfate  (90 Base) MCG/ACT inhaler, Inhale 2 puffs Every 4 (Four) Hours As Needed for Wheezing., Disp: 18 g, Rfl: 11    levothyroxine (SYNTHROID, LEVOTHROID) 100 MCG tablet, Take 1 tablet by mouth Daily., Disp: 90 tablet, Rfl: 0    atorvastatin (LIPITOR) 10 MG tablet, Take 1 tablet by mouth Daily. (Patient not taking: Reported on 9/9/2024), Disp: 90 tablet, Rfl: 1    Allergies:   No Known Allergies    Objective     Vital Signs  /72 (BP Location: Right arm, Patient Position: Sitting, Cuff Size: Adult)   Pulse 72   Temp 97.8 °F (36.6 °C) (Infrared)   Ht 160 cm (62.99\")   Wt 71.4 kg (157 lb 6.4 oz)   SpO2 96%   BMI 27.89 kg/m²   Estimated body mass index is 27.89 kg/m² as calculated from the following:    Height as of this encounter: 160 cm (62.99\").    Weight as of this encounter: 71.4 kg (157 lb 6.4 oz).    BMI is >= 25 and <30. (Overweight) The following options were offered after discussion;: Information on healthy weight added to patient's after visit summary.       Physical Exam  Constitutional:       Appearance: Normal appearance.   HENT:      Head: Normocephalic and atraumatic.      Nose: Nose normal. "   Eyes:      Conjunctiva/sclera: Conjunctivae normal.      Pupils: Pupils are equal, round, and reactive to light.   Cardiovascular:      Rate and Rhythm: Normal rate and regular rhythm.   Pulmonary:      Effort: Pulmonary effort is normal.   Abdominal:      General: Abdomen is flat.   Neurological:      General: No focal deficit present.      Mental Status: She is alert and oriented to person, place, and time.   Psychiatric:         Mood and Affect: Mood normal.         Behavior: Behavior normal.         Thought Content: Thought content normal.          Procedures     Results:  No results found for this or any previous visit (from the past 24 hour(s)).     Assessment and Plan     Assessment/Plan:  Diagnoses and all orders for this visit:    1. Encounter to establish care (Primary)  Assessment & Plan:  Reviewed medical history, family history, social history and addressed chief complaint. Reviewed mediation and dosage.       2. Age related osteoporosis, unspecified pathological fracture presence  Assessment & Plan:  DEXA from 2022 reviewed - osteoporosis of left hip. Not on treatment. We discussed starting OTC vitamin D and calcium and including more weight-based training into daily routine. We will have a follow-up visit in 2-4 weeks to discuss results of updated DEXA (done today) and discuss treatment options.     Orders:  -     Vitamin D 25 hydroxy; Future    3. Family history of Alzheimer's disease  Overview:  Patient's mother suffered from Alzheimer's disease, she just lost her sister to the same. After the death of her sister she tells me they donated her brain for dissection and found not only was she suffering from alzheimer's but ALS. Patient and I have discussed referral to [facility] but she declines at this time. She herself having no issues with memory or confusion     Assessment & Plan:  Patient expressing concern over family history. No recent memory changes or high risk behavior that would be  consistent with early dementia. Patient is interested in referral to memory clinic.      4. Routine adult health maintenance  Assessment & Plan:  UTD mammogram, colonoscopy, pap smear. Discussed immunizations that are appropriate for age - patient has opted to have these completed at local pharmacy. DEXA done this morning; pending results.       5. Acquired hypothyroidism  Assessment & Plan:  Stable. Continue Synthroid 100mcg.       6. Tobacco abuse  Assessment & Plan:  Chronic; smokes 1/4 ppd for >40 years. Has tried gums, lozenges and patches in the past. Has not tried Chantix but not interested at this time. Follows with pulm for a stable lung nodule. Has chronic cough but no other respiratory complaints.      7. Age-related osteoporosis without current pathological fracture  Assessment & Plan:  DEXA from 2022 reviewed - osteoporosis of left hip. Not on treatment. We discussed starting OTC vitamin D and calcium and including more weight-based training into daily routine. We will have a follow-up visit in 2-4 weeks to discuss results of updated DEXA (done today) and discuss treatment options.       8. Mixed hyperlipidemia  Overview:  Patient recently started of moderate-intensity Lipitor but experiencing muscle aches (not severe). We discussed taking medication at night rather than in the morning and will reassess symptoms.     The 10-year ASCVD risk score (David DK, et al., 2019) is: 12.5%    Values used to calculate the score:      Age: 69 years      Sex: Female      Is Non- : No      Diabetic: No      Tobacco smoker: Yes      Systolic Blood Pressure: 136 mmHg      Is BP treated: No      HDL Cholesterol: 75 mg/dL      Total Cholesterol: 218 mg/dL            Follow Up  No follow-ups on file.    Concepción Pedersen MD   [facility]

## 2024-09-10 DIAGNOSIS — M81.0 HIGH RISK FOR FRACTURE DUE TO OSTEOPOROSIS BY DEXA SCAN: Primary | ICD-10-CM

## 2024-09-13 ENCOUNTER — PATIENT ROUNDING (BHMG ONLY) (OUTPATIENT)
Dept: INTERNAL MEDICINE | Facility: CLINIC | Age: 69
End: 2024-09-13
Payer: MEDICARE

## 2024-09-13 NOTE — PROGRESS NOTES
My name is Janel Castellon and I am a patient experience representative for [facility].    I would like to officially welcome you to our practice.  If you do not mind I would like to ask you a few questions about your recent visit with us.  If you do not have the time to reply that is perfectly fine.      First, could you tell me what went well with your recent visit?     Secondly, we are always looking for ways to make our patients' experiences even better. Do you have any recommendations on ways we may improve?     Third, safety is a top priority therefore do you have any concerns with that while in our office?    Finally, overall were you satisfied with your first visit to our practice?     Thank you for taking the time to answer a few questions today.  In the coming days you will receive a survey.  We encourage you to complete the survey to let us know how we did!        Janel

## 2024-09-16 ENCOUNTER — TELEPHONE (OUTPATIENT)
Dept: FAMILY MEDICINE CLINIC | Facility: CLINIC | Age: 69
End: 2024-09-16
Payer: MEDICARE

## 2024-09-23 ENCOUNTER — OFFICE VISIT (OUTPATIENT)
Dept: PULMONOLOGY | Facility: CLINIC | Age: 69
End: 2024-09-23
Payer: MEDICARE

## 2024-09-23 VITALS
DIASTOLIC BLOOD PRESSURE: 90 MMHG | SYSTOLIC BLOOD PRESSURE: 134 MMHG | HEIGHT: 63 IN | TEMPERATURE: 97.7 F | BODY MASS INDEX: 27.71 KG/M2 | HEART RATE: 91 BPM | RESPIRATION RATE: 18 BRPM | WEIGHT: 156.38 LBS | OXYGEN SATURATION: 97 %

## 2024-09-23 DIAGNOSIS — J41.1 MUCOPURULENT CHRONIC BRONCHITIS: ICD-10-CM

## 2024-09-23 DIAGNOSIS — R91.1 LUNG NODULE: Primary | ICD-10-CM

## 2024-09-23 DIAGNOSIS — Z87.891 PERSONAL HISTORY OF SMOKING: ICD-10-CM

## 2024-09-23 PROBLEM — U07.1 COVID-19: Status: RESOLVED | Noted: 2023-02-16 | Resolved: 2024-09-23

## 2024-09-23 PROCEDURE — 3075F SYST BP GE 130 - 139MM HG: CPT | Performed by: INTERNAL MEDICINE

## 2024-09-23 PROCEDURE — 99214 OFFICE O/P EST MOD 30 MIN: CPT | Performed by: INTERNAL MEDICINE

## 2024-09-23 PROCEDURE — 3080F DIAST BP >= 90 MM HG: CPT | Performed by: INTERNAL MEDICINE

## 2024-09-23 PROCEDURE — 1159F MED LIST DOCD IN RCRD: CPT | Performed by: INTERNAL MEDICINE

## 2024-09-23 PROCEDURE — 1160F RVW MEDS BY RX/DR IN RCRD: CPT | Performed by: INTERNAL MEDICINE

## 2024-09-23 RX ORDER — CEPHALEXIN 500 MG/1
500 CAPSULE ORAL 4 TIMES DAILY
COMMUNITY
Start: 2024-09-19

## 2024-10-07 ENCOUNTER — OFFICE VISIT (OUTPATIENT)
Dept: INTERNAL MEDICINE | Facility: CLINIC | Age: 69
End: 2024-10-07
Payer: MEDICARE

## 2024-10-07 VITALS
HEART RATE: 82 BPM | SYSTOLIC BLOOD PRESSURE: 138 MMHG | OXYGEN SATURATION: 100 % | TEMPERATURE: 97.3 F | DIASTOLIC BLOOD PRESSURE: 82 MMHG | HEIGHT: 63 IN | BODY MASS INDEX: 27.89 KG/M2 | WEIGHT: 157.4 LBS

## 2024-10-07 DIAGNOSIS — E78.2 MIXED HYPERLIPIDEMIA: Primary | ICD-10-CM

## 2024-10-07 DIAGNOSIS — M81.0 AGE-RELATED OSTEOPOROSIS WITHOUT CURRENT PATHOLOGICAL FRACTURE: ICD-10-CM

## 2024-10-07 PROCEDURE — 1126F AMNT PAIN NOTED NONE PRSNT: CPT | Performed by: STUDENT IN AN ORGANIZED HEALTH CARE EDUCATION/TRAINING PROGRAM

## 2024-10-07 PROCEDURE — 3075F SYST BP GE 130 - 139MM HG: CPT | Performed by: STUDENT IN AN ORGANIZED HEALTH CARE EDUCATION/TRAINING PROGRAM

## 2024-10-07 PROCEDURE — 99214 OFFICE O/P EST MOD 30 MIN: CPT | Performed by: STUDENT IN AN ORGANIZED HEALTH CARE EDUCATION/TRAINING PROGRAM

## 2024-10-07 PROCEDURE — 3079F DIAST BP 80-89 MM HG: CPT | Performed by: STUDENT IN AN ORGANIZED HEALTH CARE EDUCATION/TRAINING PROGRAM

## 2024-10-07 RX ORDER — EZETIMIBE 10 MG/1
10 TABLET ORAL DAILY
Qty: 90 TABLET | Refills: 3 | Status: SHIPPED | OUTPATIENT
Start: 2024-10-07

## 2024-10-07 NOTE — ASSESSMENT & PLAN NOTE
Chronic, uncontrolled. Recent DEXA significant for osteoporosis at wrist, hip lumbar spine. Referral previously placed to endocrinology to discuss options for treatment for high-risk fracture. We briefly discussed anti-resorptive and anabolic agents. She is taking vitamin D and calcium and continues to walk and use light weights on most days. Encouraged smoking cessation.

## 2024-10-07 NOTE — PROGRESS NOTES
Office Note     Name: Maria Eugenia Sandoval    : 1955     MRN: [id number]     Chief Complaint  Follow-up    Subjective     History of Present Illness:  Maria Eugenia Sandoval is a 69 y.o. female who presents today for follow-up DEXA     The 10-year ASCVD risk score (David SALTER, et al., 2019) is: 12.8%    Values used to calculate the score:      Age: 69 years      Sex: Female      Is Non- : No      Diabetic: No      Tobacco smoker: Yes      Systolic Blood Pressure: 138 mmHg      Is BP treated: No      HDL Cholesterol: 75 mg/dL      Total Cholesterol: 218 mg/dL      DEXA significant for osteoporosis.   Taking vitamins  Exercising more  Still smoking  Did not start statin; side-effects    Review of Systems:   Review of Systems    Past Medical History:   Past Medical History:   Diagnosis Date    Anxiety     Arthritis     Cataract     CTS (carpal tunnel syndrome)     Disease of thyroid gland     Diverticulitis     Fracture of ankle 2007    Fracture, clavicle     GERD (gastroesophageal reflux disease)     Heart disease     Hip arthrosis     Screening for osteoporosis 8/15/2024    Wears contact lenses     Wears glasses        Past Surgical History:   Past Surgical History:   Procedure Laterality Date    ANKLE OPEN REDUCTION INTERNAL FIXATION      COLON RESECTION N/A 09/10/2018    Procedure: SIGMOID COLECTOMY, APPENDECTOMY, TAKEDOWN OF SPLENIC FEXURE,PROCTOSCOPY, UMBILICAL HERNIA REPAIR, DEBRIDEMENT & CULTURE OF PELVIC ABCESS,DIVERTING LOOP ILEOSTOMY, RIGHT URETEROLYSIS, BILATERAL SALPINGO OOPHORECTOMY;  Surgeon: Shant Lerner MD;  Location:  [facility];  Service: General    COLONOSCOPY      ENDOSCOPY      ILEOSTOMY CLOSURE N/A 2019    Procedure: LOOP ILEOSTOMY TAKEDOWN;  Surgeon: Shant Lerner MD;  Location:  [facility];  Service: General    NECK SURGERY  22    Laminoplasty    OOPHORECTOMY      TOTAL HIP ARTHROPLASTY Right 2017    Procedure: TOTAL HIP ARTHROPLASTY RIGHT;   Surgeon: Marek Talamantes MD;  [facility];  Service:     TUMOR REMOVAL         Family History:   Family History   Problem Relation Age of Onset    Osteoarthritis Mother     Breast cancer Neg Hx     Ovarian cancer Neg Hx        Social History:   Social History     Socioeconomic History    Marital status: Single   Tobacco Use    Smoking status: Every Day     Current packs/day: 0.50     Average packs/day: 0.5 packs/day for 47.8 years (23.9 ttl pk-yrs)     Types: Cigarettes     Start date: 1977     Passive exposure: Current    Smokeless tobacco: Never    Tobacco comments:     Wish I had never started.   Vaping Use    Vaping status: Never Used   Substance and Sexual Activity    Alcohol use: Yes     Alcohol/week: 10.0 standard drinks of alcohol     Types: 10 Glasses of wine per week     Comment: 2 glass a wine a night     Drug use: No    Sexual activity: Not Currently     Partners: Male     Birth control/protection: None       Immunizations:   Immunization History   Administered Date(s) Administered    COVID-19 (PFIZER) Purple Cap Monovalent 03/04/2021, 03/25/2021    Fluzone High-Dose 65+yrs 10/04/2022    Influenza, Unspecified 10/10/2023    Tdap 06/17/2020        Medications:     Current Outpatient Medications:     albuterol sulfate  (90 Base) MCG/ACT inhaler, Inhale 2 puffs Every 4 (Four) Hours As Needed for Wheezing., Disp: 18 g, Rfl: 11    levothyroxine (SYNTHROID, LEVOTHROID) 100 MCG tablet, Take 1 tablet by mouth Daily., Disp: 90 tablet, Rfl: 0    cephalexin (KEFLEX) 500 MG capsule, Take 1 capsule by mouth 4 (Four) Times a Day. (Patient not taking: Reported on 10/7/2024), Disp: , Rfl:     ezetimibe (Zetia) 10 MG tablet, Take 1 tablet by mouth Daily., Disp: 90 tablet, Rfl: 3    Allergies:   Allergies   Allergen Reactions    Statins Myalgia       Objective     Vital Signs  /82 (BP Location: Right arm, Patient Position: Sitting, Cuff Size: Adult)   Pulse 82   Temp 97.3 °F (36.3 °C) (Infrared)    "Ht 160 cm (62.99\")   Wt 71.4 kg (157 lb 6.4 oz)   SpO2 100%   BMI 27.89 kg/m²   Estimated body mass index is 27.89 kg/m² as calculated from the following:    Height as of this encounter: 160 cm (62.99\").    Weight as of this encounter: 71.4 kg (157 lb 6.4 oz).           Physical Exam  Constitutional:       Appearance: Normal appearance.   HENT:      Head: Normocephalic and atraumatic.      Nose: Nose normal.   Eyes:      Conjunctiva/sclera: Conjunctivae normal.      Pupils: Pupils are equal, round, and reactive to light.   Cardiovascular:      Rate and Rhythm: Normal rate and regular rhythm.   Pulmonary:      Effort: Pulmonary effort is normal.   Abdominal:      General: Abdomen is flat.   Neurological:      General: No focal deficit present.      Mental Status: She is alert and oriented to person, place, and time.   Psychiatric:         Mood and Affect: Mood normal.         Behavior: Behavior normal.         Thought Content: Thought content normal.            Procedures     Results:  No results found for this or any previous visit (from the past 24 hour(s)).     Assessment and Plan     Assessment/Plan:  Diagnoses and all orders for this visit:    1. Mixed hyperlipidemia (Primary)  Overview:  Patient recently started of moderate-intensity Lipitor but experiencing muscle aches (not severe). We discussed taking medication at night rather than in the morning and will reassess symptoms.     The 10-year ASCVD risk score (David SALTER, et al., 2019) is: 12.5%    Values used to calculate the score:      Age: 69 years      Sex: Female      Is Non- : No      Diabetic: No      Tobacco smoker: Yes      Systolic Blood Pressure: 136 mmHg      Is BP treated: No      HDL Cholesterol: 75 mg/dL      Total Cholesterol: 218 mg/dL      Assessment & Plan:  Has not started statin b/c weary of side-effects (previous use caused myalgias). D/c and start Zetia 10mg. Repeat lipid profile in 6 months.       2. " Age-related osteoporosis without current pathological fracture  Assessment & Plan:  Chronic, uncontrolled. Recent DEXA significant for osteoporosis at wrist, hip lumbar spine. Referral previously placed to endocrinology to discuss options for treatment for high-risk fracture. We briefly discussed anti-resorptive and anabolic agents. She is taking vitamin D and calcium and continues to walk and use light weights on most days. Encouraged smoking cessation.       Other orders  -     ezetimibe (Zetia) 10 MG tablet; Take 1 tablet by mouth Daily.  Dispense: 90 tablet; Refill: 3        Follow Up  No follow-ups on file.    Concepción Pedersen MD   [facility]

## 2024-10-07 NOTE — ASSESSMENT & PLAN NOTE
Has not started statin b/c weary of side-effects (previous use caused myalgias). D/c and start Zetia 10mg. Repeat lipid profile in 6 months.

## 2024-11-05 LAB
NCCN CRITERIA FLAG: NORMAL
TYRER CUZICK SCORE: 5.6

## 2024-11-14 RX ORDER — LEVOTHYROXINE SODIUM 100 UG/1
100 TABLET ORAL DAILY
Qty: 30 TABLET | Refills: 2 | Status: SHIPPED | OUTPATIENT
Start: 2024-11-14

## 2024-11-20 ENCOUNTER — HOSPITAL ENCOUNTER (OUTPATIENT)
Dept: MAMMOGRAPHY | Facility: HOSPITAL | Age: 69
Discharge: HOME | End: 2024-11-20
Admitting: NURSE PRACTITIONER
Payer: MEDICARE

## 2024-11-20 DIAGNOSIS — Z12.31 ENCOUNTER FOR SCREENING MAMMOGRAM FOR MALIGNANT NEOPLASM OF BREAST: ICD-10-CM

## 2024-11-20 PROCEDURE — 77063 BREAST TOMOSYNTHESIS BI: CPT

## 2024-11-20 PROCEDURE — 77067 SCR MAMMO BI INCL CAD: CPT

## 2024-11-22 PROCEDURE — 77067 SCR MAMMO BI INCL CAD: CPT | Performed by: RADIOLOGY

## 2024-11-22 PROCEDURE — 77063 BREAST TOMOSYNTHESIS BI: CPT | Performed by: RADIOLOGY

## 2024-12-12 ENCOUNTER — OFFICE VISIT (OUTPATIENT)
Dept: ENDOCRINOLOGY | Facility: CLINIC | Age: 69
End: 2024-12-12
Payer: MEDICARE

## 2024-12-12 ENCOUNTER — SPECIALTY PHARMACY (OUTPATIENT)
Dept: ENDOCRINOLOGY | Facility: CLINIC | Age: 69
End: 2024-12-12
Payer: MEDICARE

## 2024-12-12 VITALS
WEIGHT: 158.6 LBS | HEART RATE: 85 BPM | BODY MASS INDEX: 28.1 KG/M2 | SYSTOLIC BLOOD PRESSURE: 122 MMHG | HEIGHT: 63 IN | OXYGEN SATURATION: 98 % | DIASTOLIC BLOOD PRESSURE: 70 MMHG

## 2024-12-12 DIAGNOSIS — M81.0 AGE-RELATED OSTEOPOROSIS WITHOUT CURRENT PATHOLOGICAL FRACTURE: Primary | ICD-10-CM

## 2024-12-12 PROCEDURE — 36415 COLL VENOUS BLD VENIPUNCTURE: CPT | Performed by: INTERNAL MEDICINE

## 2024-12-12 PROCEDURE — 82306 VITAMIN D 25 HYDROXY: CPT | Performed by: INTERNAL MEDICINE

## 2024-12-12 PROCEDURE — 99204 OFFICE O/P NEW MOD 45 MIN: CPT | Performed by: INTERNAL MEDICINE

## 2024-12-12 PROCEDURE — 83970 ASSAY OF PARATHORMONE: CPT | Performed by: INTERNAL MEDICINE

## 2024-12-12 PROCEDURE — 3078F DIAST BP <80 MM HG: CPT | Performed by: INTERNAL MEDICINE

## 2024-12-12 PROCEDURE — 80069 RENAL FUNCTION PANEL: CPT | Performed by: INTERNAL MEDICINE

## 2024-12-12 PROCEDURE — 3074F SYST BP LT 130 MM HG: CPT | Performed by: INTERNAL MEDICINE

## 2024-12-12 PROCEDURE — 1160F RVW MEDS BY RX/DR IN RCRD: CPT | Performed by: INTERNAL MEDICINE

## 2024-12-12 PROCEDURE — 1159F MED LIST DOCD IN RCRD: CPT | Performed by: INTERNAL MEDICINE

## 2024-12-12 RX ORDER — TERIPARATIDE 250 UG/ML
20 INJECTION, SOLUTION SUBCUTANEOUS DAILY
Qty: 2.48 ML | Refills: 5 | Status: SHIPPED | OUTPATIENT
Start: 2024-12-12

## 2024-12-12 NOTE — ASSESSMENT & PLAN NOTE
She has marked osteoporosis currently that has become worse over time.  We discussed checking labs for possible secondary causes.  We discussed treatment options.  I would like to start treatment with anabolic agent if her insurance will approve.

## 2024-12-12 NOTE — PROGRESS NOTES
Specialty Pharmacy Patient Management Program  Endocrinology Initial Assessment     Maria Eugenia Sandoval was referred by an Endocrinology provider to the Endocrinology Patient Management program offered by [facility] for Osteoporosis on 12/12/24.  An initial outreach was conducted, including assessment of therapy appropriateness and specialty medication education for Forteo. The patient was introduced to services offered by [facility], including: regular assessments, refill coordination, curbside pick-up or mail order delivery options, prior authorization maintenance, and financial assistance programs as applicable. The patient was also provided with contact information for the pharmacy team.     Insurance Coverage & Financial Support  Medicre     Relevant Past Medical History and Comorbidities  Relevant medical history and concomitant health conditions were discussed with the patient. The patient's chart has been reviewed for relevant past medical history and comorbid conditions and updated as necessary.  Past Medical History:   Diagnosis Date    Anxiety     Arthritis     Cataract     CTS (carpal tunnel syndrome) 2022    Disease of thyroid gland     Diverticulitis     Fracture of ankle 2007    Fracture, clavicle 2012    GERD (gastroesophageal reflux disease)     Heart disease     Hip arthrosis 2017    Screening for osteoporosis 8/15/2024    Wears contact lenses     Wears glasses      Social History     Socioeconomic History    Marital status: Single   Tobacco Use    Smoking status: Every Day     Current packs/day: 0.50     Average packs/day: 0.5 packs/day for 47.9 years (24.0 ttl pk-yrs)     Types: Cigarettes     Start date: 1977     Passive exposure: Current    Smokeless tobacco: Never    Tobacco comments:     Wish I had never started.   Vaping Use    Vaping status: Never Used   Substance and Sexual Activity    Alcohol use: Yes     Alcohol/week: 10.0 standard drinks of  alcohol     Types: 10 Glasses of wine per week     Comment: 2 glass a wine a night     Drug use: No    Sexual activity: Not Currently     Partners: Male     Birth control/protection: None       Problem list reviewed by Monica Adams PharmD on 12/12/2024 at  2:12 PM    Allergies  Known allergies and reactions were discussed with the patient. The patient's chart has been reviewed for  allergy information and updated as necessary.   Allergies   Allergen Reactions    Statins Myalgia       Allergies reviewed by Monica Adams PharmD on 12/12/2024 at  2:12 PM    Relevant Laboratory Values  Relevant laboratory values were discussed with the patient. The following specialty medication dose adjustment(s) are recommended:  Initial T Score: Femoral Neck (-3.5), Radius (-4.2), Hip (-2.5), lumbar spine (-1.5).    Lab Results   Component Value Date    HGBA1C 5.3 11/22/2023     Lab Results   Component Value Date    GLUCOSE 92 08/20/2024    CALCIUM 9.9 08/20/2024     08/20/2024    K 5.1 08/20/2024    CO2 25.7 08/20/2024     08/20/2024    BUN 10 08/20/2024    CREATININE 0.68 08/20/2024    EGFRIFAFRI >60 04/01/2022    EGFRIFNONA >60 04/01/2022    BCR 14.7 08/20/2024    ANIONGAP 10.3 08/20/2024     Lab Results   Component Value Date    CHOL 218 (H) 08/20/2024    TRIG 68 08/20/2024    HDL 75 (H) 08/20/2024     (H) 08/20/2024         Current Medication List  This medication list has been reviewed with the patient and evaluated for any interactions or necessary modifications/recommendations, and updated to include all prescription medications, OTC medications, and supplements the patient is currently taking.  This list reflects what is contained in the patient's profile, which has also been marked as reviewed to communicate to other providers it is the most up to date version of the patient's current medication therapy.     Current Outpatient Medications:     albuterol sulfate  (90 Base) MCG/ACT inhaler,  Inhale 2 puffs Every 4 (Four) Hours As Needed for Wheezing., Disp: 18 g, Rfl: 11    cephalexin (KEFLEX) 500 MG capsule, Take 1 capsule by mouth 4 (Four) Times a Day., Disp: , Rfl:     ezetimibe (Zetia) 10 MG tablet, Take 1 tablet by mouth Daily., Disp: 90 tablet, Rfl: 3    levothyroxine (SYNTHROID, LEVOTHROID) 100 MCG tablet, TAKE 1 TABLET BY MOUTH DAILY, Disp: 30 tablet, Rfl: 2    Teriparatide (FORTEO) 620 MCG/2.48ML injection, Inject 0.09 mL under the skin into the appropriate area as directed Daily., Disp: 2.48 mL, Rfl: 5    Medicines reviewed by Monica Adams, PharmD on 12/12/2024 at  2:12 PM    Drug Interactions  No Clinically Significant DDIs Were Identified at Present Time Upon Marking Medications Reviewed     Initial Education Provided for Specialty Medication  The patient has been provided with the following education and any applicable administration techniques (i.e. self-injection) have been demonstrated for the therapies indicated. All questions and concerns have been addressed prior to the patient receiving the medication, and the patient has verbalized comprehension of the education and any materials provided. Additional patient education shall be provided and documented upon request by the patient, provider, or payer.    FORTEO® (teriparatide)  Medication Expectations   Why am I taking this medication? You are taking this medication to help prevent bone fractures because you have osteoporosis.    What should I expect while on this medication? You should expect to see your bone mineral density increase over a period of 18 to 24 months. During this time, you should expect to get regular lab work as directed by your doctor to monitor your progress.    How does the medication work? Osteoporosis can reduce your bone density, increasing your risk for fractures. Forteo can help to increase bone density by mimicking another hormone in your body called parathyroid hormone (PTH). This helps your bone-building  cells (osteoblasts) work more effectively, and increases the amount of calcium your body absorbs.   How long will I be on this medication for? The amount of time you will be on this medication will be determined by your doctor based on your lab results.  Generally, you will not be on this medication for more than two years. After you stop Forteo, you may be switched to another medication.   How do I take this medication? Take as directed on your prescription label. Forteo is generally injected under the skin once daily, into the thigh or abdomen (stomach).  Forteo can be taken with or without food. You should administer your daily dose immediately after taking the medication out of the refrigerator.    What are some possible side effects? The most common side effects include increased calcium in the body (hypercalcemia) and nausea. You may also experience dizziness, faintness upon standing (orthostatic hypotension), or joint pain.  Your doctor will monitor your calcium levels through regular lab work. Talk with your doctor if you notice these or other side effects that do not go away or get better with time.      What happens if I miss a dose? If you miss a dose, take it as soon as you remember. If it is close to your next dose, skip it (do not take 2 doses at once).      Medication Safety   What are things I should warn my doctor immediately about? Tell your doctor if you have an autoimmune disease, kidney failure, or you are taking blood thinners (such as warfarin) or steroids. Talk to your doctor if you are pregnant, planning to become pregnant, or breastfeeding. Also tell your doctor if you notice any signs/symptoms of an allergic reaction (rash, hives, difficulty breathing, etc.) or blisters on your skin.     What are things that I should be aware of? Be cautious of any side effects from this medication. Talk to your doctor if any new ones develop or aren't getting better.  You should give your first dose in a  place where you can lie or sit down, in case you feel dizzy or faint. If this occurs, it should subside within a few minutes to hours.    What are some medications that can interact with this one? There are no currently known medication interactions that would be serious.  However, this does not mean that medication interactions cannot happen.  Always tell your doctor or pharmacist immediately if you start taking any new medications, including over-the-counter medications, vitamins, and herbal supplements.       Medication Storage/Handling   How should I handle this medication? Keep this medication out of reach of pets/children in the original container. Do not remove medication from the injector pen.  Use caution when administering medication as needles are required with use.     How does this medication need to be stored? Store in the refrigerator. It is important to avoid freezing the medication.  Remove from the refrigerator only when you are ready to inject your dose.    How should I dispose of this medication? Discard pen 28 days after first injection, even if it still contains some unused solution. Do not use if solution is cloudy, colored, or contains solid particles. Discard medication if frozen.  If you need to discard the medication, you may be able to take to your local police station for proper disposal or find take-back bins at pharmacies.      Resources/Support   How can I remind myself to take this medication? You can download reminder apps to help you manage your refills. You may also set an alarm on your phone to remind you to take your daily dose of Forteo.   Is financial support available?  [employer] can provide co-pay cards if you have commercial insurance or patient assistance if you have Medicare or no insurance.    Which vaccines are recommended for me? Talk to your doctor about these vaccines: Flu, Coronavirus (COVID-19), Pneumococcal (pneumonia), Tdap, Hepatitis B, Zoster (shingles)           Adherence and Self-Administration  Adherence related to the patient's specialty therapy was discussed with the patient. The Adherence segment of this outreach has been reviewed and updated.              Methods for Supporting Patient Adherence and/or Self-Administration: None.    Open Medication Therapy Problems  No medication therapy recommendations to display    Goals of Therapy  Goals related to the patient's specialty therapy were discussed with the patient. The Patient Goals segment of this outreach has been reviewed and updated.   Goals Addressed Today        Specialty Pharmacy General Goal       Improved bone mineral density on DEXA scan (if applicable) and avoidance of osteoporosis-related fracture    12/12: Initial T Score: Femoral Neck (-3.5), Radius (-4.2), Hip (-2.5), lumbar spine (-1.5). MS            Reassessment Plan & Follow-Up  1. Medication Therapy Changes: Initiate Forteo. Plan for 2 year treatment.   2. Related Plans, Therapy Recommendations, or Therapy Problems to Be Addressed: None.   3. Pharmacist to perform regular assessments no more than (6) months from the previous assessment.  4. Care Coordinator to set up future refill outreaches, coordinate prescription delivery, and escalate clinical questions to pharmacist.  5. Welcome information and patient satisfaction survey to be sent by specialty pharmacy team with patient's initial fill.    Attestation      I attest the patient was actively involved in and has agreed to the above plan of care. If the prescribed therapy is at any point deemed not appropriate based on the current or future assessments, a consultation will be initiated with the patient's specialty care provider to determine the best course of action. The revised plan of therapy will be documented along with any required assessments and/or additional patient education provided.     Monica Adams, PharmD, BCCCP, BCPS  Clinical Specialty Pharmacist,  Endocrinology  12/12/2024  14:27 EST    Discussed the aforementioned information with the patient via In-Person.

## 2024-12-12 NOTE — PROGRESS NOTES
Office Note      Date: 2024  Patient Name: Maria Eugenia Sandoval  MRN: [id number]  : 1955    Chief Complaint   Patient presents with    Osteoporosis     New patient for Osteoporosis per DEXA scan        History of Present Illness:   Maria Eugenia Sandoval is a 69 y.o. female who presents for Osteoporosis (New patient for Osteoporosis per DEXA scan )    She had DEXA in 2017 that showed osteoporosis in the L spine.  DEXA from 2022 showed osteoporosis in the left femoral neck but osteopenia in the L spine.  DEXA from 2024 showed significantly worse BMD in the left femoral neck (T score -3.5).  Also the distal radius T score was -4.2.  She has been taking calcium and vit D.  She has been doing weight bearing exercise.  She has h/o hypothyroidism.  Last TSH was okay at 4.13.  Recent labs showed normal calcium of 9.9.  I don't see recent vit D level.  She reports h/o left ankle fracture due to trauma in .  She reports h/o wrist fracture in her late teens.  She denies any height loss.  She reports undergoing menopause around age 52.  She hasn't taken HRT.  She admits to EtOH use - about 5 to 6 glasses per week.  She smokes cigarettes.  She denies any kidney stones.  She denies any h/o hypercalcemia.  She denies any steroid use.  She denies h/o RA.  She reports h/o osteoporosis in her mother and sister.    Subjective      Review of Systems:   Review of Systems   HENT:  Positive for rhinorrhea and sneezing.    Eyes: Negative.    Respiratory:  Positive for cough.    Cardiovascular: Negative.    Gastrointestinal: Negative.    Endocrine: Negative.    Genitourinary:  Positive for enuresis.   Musculoskeletal:  Positive for gait problem.   Skin: Negative.    Allergic/Immunologic: Negative.    Neurological:  Positive for weakness and numbness.   Hematological: Negative.    Psychiatric/Behavioral: Negative.         The following portions of the patient's history were reviewed and updated as appropriate: allergies, current  "medications, past family history, past medical history, past social history, past surgical history, and problem list.    Objective     Visit Vitals  /70 (BP Location: Left arm, Patient Position: Sitting)   Pulse 85   Ht 160 cm (62.99\")   Wt 71.9 kg (158 lb 9.6 oz)   SpO2 98%   BMI 28.10 kg/m²       Physical Exam:  Physical Exam  Constitutional:       Appearance: Normal appearance.   HENT:      Head: Normocephalic and atraumatic.   Eyes:      Extraocular Movements: Extraocular movements intact.      Conjunctiva/sclera: Conjunctivae normal.   Neck:      Thyroid: No thyroid mass, thyromegaly or thyroid tenderness.   Cardiovascular:      Rate and Rhythm: Normal rate and regular rhythm.      Pulses: Normal pulses.      Heart sounds: Normal heart sounds.   Pulmonary:      Effort: Pulmonary effort is normal.      Breath sounds: Normal breath sounds.   Abdominal:      General: Bowel sounds are normal.      Palpations: Abdomen is soft.   Musculoskeletal:         General: Normal range of motion.      Cervical back: Normal range of motion and neck supple.   Lymphadenopathy:      Cervical: No cervical adenopathy.   Skin:     General: Skin is warm and dry.   Neurological:      General: No focal deficit present.      Mental Status: She is alert.   Psychiatric:         Mood and Affect: Mood normal.         Behavior: Behavior normal.         Thought Content: Thought content normal.         Judgment: Judgment normal.         Labs:    TSH  No results found for: \"TSHBASE\"     Free T4  Free T4   Date Value Ref Range Status   06/29/2023 1.35 0.93 - 1.70 ng/dL Final       T3  No results found for: \"Q7ZGQXD\"      TPO  No results found for: \"THYROIDAB\"    TG AB  No results found for: \"THGAB\"    TG  No results found for: \"THYROGLB\"    CBC w/DIFF  Lab Results   Component Value Date    WBC 5.13 08/20/2024    RBC 4.60 08/20/2024    HGB 15.6 08/20/2024    HCT 46.2 08/20/2024    .4 (H) 08/20/2024    MCH 33.9 (H) 08/20/2024    MCHC " 33.8 08/20/2024    RDW 11.8 (L) 08/20/2024    RDWSD 43.6 08/20/2024    MPV 11.0 08/20/2024     08/20/2024    NEUTRORELPCT 46.6 08/20/2024    LYMPHORELPCT 37.4 08/20/2024    MONORELPCT 10.5 08/20/2024    EOSRELPCT 3.3 08/20/2024    BASORELPCT 1.8 (H) 08/20/2024    AUTOIGPER 0.4 08/20/2024    NEUTROABS 2.39 08/20/2024    LYMPHSABS 1.92 08/20/2024    MONOSABS 0.54 08/20/2024    EOSABS 0.17 08/20/2024    BASOSABS 0.09 08/20/2024    AUTOIGNUM 0.02 08/20/2024    NRBC 0.0 08/20/2024           Assessment / Plan      Assessment & Plan:  Diagnoses and all orders for this visit:    1. Age-related osteoporosis without current pathological fracture (Primary)  Assessment & Plan:  She has marked osteoporosis currently that has become worse over time.  We discussed checking labs for possible secondary causes.  We discussed treatment options.  I would like to start treatment with anabolic agent if her insurance will approve.    Orders:  -     Renal Function Panel; Future  -     PTH, Intact; Future  -     Vitamin D,25-Hydroxy; Future    Other orders  -     Teriparatide (FORTEO) 620 MCG/2.48ML injection; Inject 0.09 mL under the skin into the appropriate area as directed Daily.  Dispense: 2.48 mL; Refill: 5      Current Outpatient Medications   Medication Instructions    albuterol sulfate  (90 Base) MCG/ACT inhaler 2 puffs, Inhalation, Every 4 Hours PRN    cephalexin (KEFLEX) 500 mg, 4 Times Daily    ezetimibe (ZETIA) 10 mg, Oral, Daily    levothyroxine (SYNTHROID, LEVOTHROID) 100 mcg, Oral, Daily    Teriparatide (FORTEO) 20 mcg, Subcutaneous, Daily      Return in about 6 months (around 6/12/2025) for Recheck with CMP, vit D.    Electronically signed by: Luis Enrique Lewis MD  12/12/2024

## 2024-12-13 LAB
25(OH)D3 SERPL-MCNC: 25.9 NG/ML (ref 30–100)
ALBUMIN SERPL-MCNC: 4.6 G/DL (ref 3.5–5.2)
ANION GAP SERPL CALCULATED.3IONS-SCNC: 18 MMOL/L (ref 5–15)
BUN SERPL-MCNC: 15 MG/DL (ref 8–23)
BUN/CREAT SERPL: 21.4 (ref 7–25)
CALCIUM SPEC-SCNC: 10.1 MG/DL (ref 8.6–10.5)
CHLORIDE SERPL-SCNC: 100 MMOL/L (ref 98–107)
CO2 SERPL-SCNC: 24 MMOL/L (ref 22–29)
CREAT SERPL-MCNC: 0.7 MG/DL (ref 0.57–1)
EGFRCR SERPLBLD CKD-EPI 2021: 94.9 ML/MIN/1.73
GLUCOSE SERPL-MCNC: 101 MG/DL (ref 65–99)
PHOSPHATE SERPL-MCNC: 3.8 MG/DL (ref 2.5–4.5)
POTASSIUM SERPL-SCNC: 4.8 MMOL/L (ref 3.5–5.2)
PTH-INTACT SERPL-MCNC: 24.1 PG/ML (ref 15–65)
SODIUM SERPL-SCNC: 142 MMOL/L (ref 136–145)

## 2024-12-13 RX ORDER — ERGOCALCIFEROL 1.25 MG/1
50000 CAPSULE ORAL WEEKLY
Qty: 12 CAPSULE | Refills: 0 | Status: SHIPPED | OUTPATIENT
Start: 2024-12-13 | End: 2025-12-13

## 2024-12-13 NOTE — PROGRESS NOTES
Specialty Pharmacy Patient Management Program  Endocrinology Benefits Investigation      Maria Eugenia is seen by a [facility] Endocrinology provider and is currently taking a target specialty medication.  The patient is INELIGIBLE for enrollment in the Endocrinology Patient Management Program offered by [facility] at this time. Patient may become eligible for the program in the future if the reason for ineligibility below changes.     Benefit Investigation  Reason: Not Financially Viable    Education Provided for Specialty Medication  The patient has been provided with the following education and any applicable administration techniques (i.e. self-injection) have been demonstrated for the therapies indicated. All questions and concerns have been addressed prior to the patient receiving the medication, and the patient has verbalized understanding of the education and any materials provided.  Additional patient education shall be provided and documented upon request by the patient, provider or payer.      FORTEO® (teriparatide)  Medication Expectations   Why am I taking this medication? You are taking this medication to help prevent bone fractures because you have osteoporosis.    What should I expect while on this medication? You should expect to see your bone mineral density increase over a period of 18 to 24 months. During this time, you should expect to get regular lab work as directed by your doctor to monitor your progress.    How does the medication work? Osteoporosis can reduce your bone density, increasing your risk for fractures. Forteo can help to increase bone density by mimicking another hormone in your body called parathyroid hormone (PTH). This helps your bone-building cells (osteoblasts) work more effectively, and increases the amount of calcium your body absorbs.   How long will I be on this medication for? The amount of time you will be on this medication will be determined by  your doctor based on your lab results.  Generally, you will not be on this medication for more than two years. After you stop Forteo, you may be switched to another medication.   How do I take this medication? Take as directed on your prescription label. Forteo is generally injected under the skin once daily, into the thigh or abdomen (stomach).  Forteo can be taken with or without food. You should administer your daily dose immediately after taking the medication out of the refrigerator.    What are some possible side effects? The most common side effects include increased calcium in the body (hypercalcemia) and nausea. You may also experience dizziness, faintness upon standing (orthostatic hypotension), or joint pain.  Your doctor will monitor your calcium levels through regular lab work. Talk with your doctor if you notice these or other side effects that do not go away or get better with time.      What happens if I miss a dose? If you miss a dose, take it as soon as you remember. If it is close to your next dose, skip it (do not take 2 doses at once).      Medication Safety   What are things I should warn my doctor immediately about? Tell your doctor if you have an autoimmune disease, kidney failure, or you are taking blood thinners (such as warfarin) or steroids. Talk to your doctor if you are pregnant, planning to become pregnant, or breastfeeding. Also tell your doctor if you notice any signs/symptoms of an allergic reaction (rash, hives, difficulty breathing, etc.) or blisters on your skin.     What are things that I should be aware of? Be cautious of any side effects from this medication. Talk to your doctor if any new ones develop or aren't getting better.  You should give your first dose in a place where you can lie or sit down, in case you feel dizzy or faint. If this occurs, it should subside within a few minutes to hours.    What are some medications that can interact with this one? There are no  currently known medication interactions that would be serious.  However, this does not mean that medication interactions cannot happen.  Always tell your doctor or pharmacist immediately if you start taking any new medications, including over-the-counter medications, vitamins, and herbal supplements.       Medication Storage/Handling   How should I handle this medication? Keep this medication out of reach of pets/children in the original container. Do not remove medication from the injector pen.  Use caution when administering medication as needles are required with use.     How does this medication need to be stored? Store in the refrigerator. It is important to avoid freezing the medication.  Remove from the refrigerator only when you are ready to inject your dose.    How should I dispose of this medication? Discard pen 28 days after first injection, even if it still contains some unused solution. Do not use if solution is cloudy, colored, or contains solid particles. Discard medication if frozen.  If you need to discard the medication, you may be able to take to your local police station for proper disposal or find take-back bins at pharmacies.      Resources/Support   How can I remind myself to take this medication? You can download reminder apps to help you manage your refills. You may also set an alarm on your phone to remind you to take your daily dose of Forteo.   Is financial support available?  [employer] can provide co-pay cards if you have commercial insurance or patient assistance if you have Medicare or no insurance.    Which vaccines are recommended for me? Talk to your doctor about these vaccines: Flu, Coronavirus (COVID-19), Pneumococcal (pneumonia), Tdap, Hepatitis B, Zoster (shingles)          Changes to Therapy Plan Discussed with Patient  Additional Plans, Therapy Recommendations, or Therapy Problems to Be Addressed:  Patient is attempting to enroll in [employer] PAP program for assistance.      Monica Adams, PharmD, BCCCP, BCPS  Clinical Specialty Pharmacist, Endocrinology  12/13/2024  11:15 EST

## 2025-03-06 RX ORDER — ERGOCALCIFEROL 1.25 MG/1
50000 CAPSULE, LIQUID FILLED ORAL WEEKLY
Qty: 12 CAPSULE | Refills: 0 | Status: SHIPPED | OUTPATIENT
Start: 2025-03-06

## 2025-03-06 NOTE — TELEPHONE ENCOUNTER
Rx Refill Note  Requested Prescriptions     Pending Prescriptions Disp Refills    vitamin D (ERGOCALCIFEROL) 1.25 MG (50103 UT) capsule capsule [Pharmacy Med Name: VITAMIN D2 50,000IU (ERGO) CAP RX] 12 capsule 0     Sig: TAKE 1 CAPSULE BY MOUTH 1 TIME EVERY WEEK      Last office visit with prescribing clinician: 12/12/2024   Last telemedicine visit with prescribing clinician: Visit date not found   Next office visit with prescribing clinician: 6/12/2025                         Would you like a call back once the refill request has been completed: [] Yes [] No    If the office needs to give you a call back, can they leave a voicemail: [] Yes [] No    Gabriela Gibson MA  03/06/25, 10:16 EST

## 2025-03-13 ENCOUNTER — TELEPHONE (OUTPATIENT)
Dept: ENDOCRINOLOGY | Facility: CLINIC | Age: 70
End: 2025-03-13
Payer: MEDICARE

## 2025-03-13 RX ORDER — ALENDRONATE SODIUM 70 MG/1
70 TABLET ORAL
Qty: 12 TABLET | Refills: 3 | Status: SHIPPED | OUTPATIENT
Start: 2025-03-13 | End: 2026-03-13

## 2025-03-13 NOTE — TELEPHONE ENCOUNTER
Spoke with patient.  She states she cannot take the Forteo due to it being an injection.  Asking if she could go on Fosamax.  Advised to contact pharmacy regarding returning the Forteo.

## 2025-03-13 NOTE — TELEPHONE ENCOUNTER
PATIENT IS CALLING STATING SHE WANTS SPEAK TO SOMEONE ABOUT HER FORTEO. SHE IS NOT WANTING TO TAKE IT AND NEEDS TO KNOW HOW TO SEND IT BACK TO OUR PHARMACY. PHONE NUMBER [phone]

## 2025-04-17 RX ORDER — LEVOTHYROXINE SODIUM 100 UG/1
100 TABLET ORAL DAILY
Qty: 30 TABLET | Refills: 2 | Status: SHIPPED | OUTPATIENT
Start: 2025-04-17

## 2025-05-27 RX ORDER — ERGOCALCIFEROL 1.25 MG/1
50000 CAPSULE, LIQUID FILLED ORAL WEEKLY
Qty: 12 CAPSULE | Refills: 0 | OUTPATIENT
Start: 2025-05-27

## 2025-05-27 NOTE — TELEPHONE ENCOUNTER
Rx Refill Note  Requested Prescriptions     Pending Prescriptions Disp Refills    vitamin D (ERGOCALCIFEROL) 1.25 MG (29676 UT) capsule capsule [Pharmacy Med Name: VITAMIN D2 50,000IU (ERGO) CAP RX] 12 capsule 0     Sig: TAKE 1 CAPSULE BY MOUTH 1 TIME EVERY WEEK      Last office visit with prescribing clinician: 12/12/2024     Next office visit with prescribing clinician: 6/12/2025     Janie Nicolas MA  05/27/25, 14:39 EDT

## 2025-06-12 ENCOUNTER — OFFICE VISIT (OUTPATIENT)
Dept: ENDOCRINOLOGY | Facility: CLINIC | Age: 70
End: 2025-06-12
Payer: MEDICARE

## 2025-06-12 VITALS
DIASTOLIC BLOOD PRESSURE: 80 MMHG | HEART RATE: 76 BPM | OXYGEN SATURATION: 99 % | HEIGHT: 63 IN | SYSTOLIC BLOOD PRESSURE: 140 MMHG | BODY MASS INDEX: 28 KG/M2 | WEIGHT: 158 LBS

## 2025-06-12 DIAGNOSIS — E55.9 VITAMIN D DEFICIENCY: ICD-10-CM

## 2025-06-12 DIAGNOSIS — M81.0 AGE-RELATED OSTEOPOROSIS WITHOUT CURRENT PATHOLOGICAL FRACTURE: Primary | ICD-10-CM

## 2025-06-12 PROCEDURE — 1159F MED LIST DOCD IN RCRD: CPT | Performed by: INTERNAL MEDICINE

## 2025-06-12 PROCEDURE — 3079F DIAST BP 80-89 MM HG: CPT | Performed by: INTERNAL MEDICINE

## 2025-06-12 PROCEDURE — 3077F SYST BP >= 140 MM HG: CPT | Performed by: INTERNAL MEDICINE

## 2025-06-12 PROCEDURE — 36415 COLL VENOUS BLD VENIPUNCTURE: CPT | Performed by: INTERNAL MEDICINE

## 2025-06-12 PROCEDURE — 80069 RENAL FUNCTION PANEL: CPT | Performed by: INTERNAL MEDICINE

## 2025-06-12 PROCEDURE — 99213 OFFICE O/P EST LOW 20 MIN: CPT | Performed by: INTERNAL MEDICINE

## 2025-06-12 PROCEDURE — 82306 VITAMIN D 25 HYDROXY: CPT | Performed by: INTERNAL MEDICINE

## 2025-06-12 PROCEDURE — 1160F RVW MEDS BY RX/DR IN RCRD: CPT | Performed by: INTERNAL MEDICINE

## 2025-06-12 NOTE — PROGRESS NOTES
Office Note      Date: 2025  Patient Name: Maria Eugenia Snadoval  MRN: [id number]  : 1955    Chief Complaint   Patient presents with    Osteoporosis       History of Present Illness:   Maria Eugenia Sandoval is a 70 y.o. female who presents for Osteoporosis    She had DEXA in  that showed osteoporosis in the L spine. DEXA from 2022 showed osteoporosis in the left femoral neck but osteopenia in the L spine. DEXA from 2024 showed significantly worse BMD in the left femoral neck (T score -3.5). Also the distal radius T score was -4.2. She has been taking calcium and vit D. She has been doing weight bearing exercise. She reports h/o left ankle fracture due to trauma in . She reports h/o wrist fracture in her late teens. She denies any height loss. She reports undergoing menopause around age 52. She hasn't taken HRT. She admits to EtOH use - about 5 to 6 glasses per week. She smokes cigarettes. She denies any kidney stones.  She denies any fractures since her last visit.    At the last visit we prescribed teriparatide and got it approved with the insurance.  However, she said she couldn't do the daily injections required.  We then prescribed alendronate 70mg week.       Labs last visit showed normal serum calcium and PTH.  The vit D was low.  We prescribed high dose vit D weekly.  She has one capsule left.  She is also taking OTC vit D but isn't sure of the dose.    She is schedule for tooth extraction in the morning.      Subjective      Review of Systems:   Review of Systems   Constitutional: Negative.    Cardiovascular: Negative.    Gastrointestinal: Negative.    Endocrine: Negative.        The following portions of the patient's history were reviewed and updated as appropriate: allergies, current medications, past family history, past medical history, past social history, past surgical history, and problem list.    Objective     Visit Vitals  /80 (BP Location: Left arm, Patient Position: Sitting,  "Cuff Size: Adult)   Pulse 76   Ht 159.8 cm (62.9\")   Wt 71.7 kg (158 lb)   SpO2 99%   BMI 28.08 kg/m²       Physical Exam:  Physical Exam  Constitutional:       Appearance: Normal appearance.   Neurological:      Mental Status: She is alert.         Labs:    TSH  No results found for: \"TSHBASE\"     Free T4  Free T4   Date Value Ref Range Status   06/29/2023 1.35 0.93 - 1.70 ng/dL Final       T3  No results found for: \"E5PJCEW\"      TPO  No results found for: \"THYROIDAB\"    TG AB  No results found for: \"THGAB\"    TG  No results found for: \"THYROGLB\"    CBC w/DIFF  Lab Results   Component Value Date    WBC 5.13 08/20/2024    RBC 4.60 08/20/2024    HGB 15.6 08/20/2024    HCT 46.2 08/20/2024    .4 (H) 08/20/2024    MCH 33.9 (H) 08/20/2024    MCHC 33.8 08/20/2024    RDW 11.8 (L) 08/20/2024    RDWSD 43.6 08/20/2024    MPV 11.0 08/20/2024     08/20/2024    NEUTRORELPCT 46.6 08/20/2024    LYMPHORELPCT 37.4 08/20/2024    MONORELPCT 10.5 08/20/2024    EOSRELPCT 3.3 08/20/2024    BASORELPCT 1.8 (H) 08/20/2024    AUTOIGPER 0.4 08/20/2024    NEUTROABS 2.39 08/20/2024    LYMPHSABS 1.92 08/20/2024    MONOSABS 0.54 08/20/2024    EOSABS 0.17 08/20/2024    BASOSABS 0.09 08/20/2024    AUTOIGNUM 0.02 08/20/2024    NRBC 0.0 08/20/2024           Assessment / Plan      Assessment & Plan:  Diagnoses and all orders for this visit:    1. Age-related osteoporosis without current pathological fracture (Primary)  Assessment & Plan:  Continue alendronate.  Plan for another DEXA in fall of 2026.  Continue vit D supplementation.  We discussed doing weight bearing exercise today.    Orders:  -     Renal Function Panel; Future    2. Vitamin D deficiency  Assessment & Plan:  Continue vit D supplement.  Check vit D level today.    Orders:  -     Vitamin D,25-Hydroxy; Future      Current Outpatient Medications   Medication Instructions    albuterol sulfate  (90 Base) MCG/ACT inhaler 2 puffs, Inhalation, Every 4 Hours PRN    " alendronate (FOSAMAX) 70 mg, Oral, Every 7 Days    cephalexin (KEFLEX) 500 mg, 4 Times Daily    ezetimibe (ZETIA) 10 mg, Oral, Daily    levothyroxine (SYNTHROID, LEVOTHROID) 100 mcg, Oral, Daily    vitamin D (ERGOCALCIFEROL) 50,000 Units, Oral, Weekly      Return in about 6 months (around 12/12/2025) for Recheck with CMP, vit D.    Electronically signed by: Luis Enrique Lewis MD  06/12/2025

## 2025-06-12 NOTE — ASSESSMENT & PLAN NOTE
Continue alendronate.  Plan for another DEXA in fall of 2026.  Continue vit D supplementation.  We discussed doing weight bearing exercise today.

## 2025-06-13 ENCOUNTER — RESULTS FOLLOW-UP (OUTPATIENT)
Dept: ENDOCRINOLOGY | Facility: CLINIC | Age: 70
End: 2025-06-13
Payer: MEDICARE

## 2025-06-13 LAB
25(OH)D3 SERPL-MCNC: 42.6 NG/ML (ref 30–100)
ALBUMIN SERPL-MCNC: 4.4 G/DL (ref 3.5–5.2)
ANION GAP SERPL CALCULATED.3IONS-SCNC: 13 MMOL/L (ref 5–15)
BUN SERPL-MCNC: 13 MG/DL (ref 8–23)
BUN/CREAT SERPL: 18.6 (ref 7–25)
CALCIUM SPEC-SCNC: 9.9 MG/DL (ref 8.6–10.5)
CHLORIDE SERPL-SCNC: 97 MMOL/L (ref 98–107)
CO2 SERPL-SCNC: 24 MMOL/L (ref 22–29)
CREAT SERPL-MCNC: 0.7 MG/DL (ref 0.57–1)
EGFRCR SERPLBLD CKD-EPI 2021: 94.3 ML/MIN/1.73
GLUCOSE SERPL-MCNC: 77 MG/DL (ref 65–99)
PHOSPHATE SERPL-MCNC: 3.5 MG/DL (ref 2.5–4.5)
POTASSIUM SERPL-SCNC: 4.3 MMOL/L (ref 3.5–5.2)
SODIUM SERPL-SCNC: 134 MMOL/L (ref 136–145)

## 2025-07-15 RX ORDER — LEVOTHYROXINE SODIUM 100 UG/1
100 TABLET ORAL DAILY
Qty: 90 TABLET | Refills: 1 | Status: SHIPPED | OUTPATIENT
Start: 2025-07-15

## 2025-08-18 ENCOUNTER — LAB (OUTPATIENT)
Dept: INTERNAL MEDICINE | Facility: CLINIC | Age: 70
End: 2025-08-18
Payer: MEDICARE

## 2025-08-18 ENCOUNTER — OFFICE VISIT (OUTPATIENT)
Dept: INTERNAL MEDICINE | Facility: CLINIC | Age: 70
End: 2025-08-18
Payer: MEDICARE

## 2025-08-18 VITALS
BODY MASS INDEX: 27.89 KG/M2 | HEART RATE: 74 BPM | OXYGEN SATURATION: 96 % | SYSTOLIC BLOOD PRESSURE: 108 MMHG | TEMPERATURE: 98.7 F | HEIGHT: 63 IN | DIASTOLIC BLOOD PRESSURE: 64 MMHG | WEIGHT: 157.4 LBS

## 2025-08-18 DIAGNOSIS — M81.0 AGE-RELATED OSTEOPOROSIS WITHOUT CURRENT PATHOLOGICAL FRACTURE: ICD-10-CM

## 2025-08-18 DIAGNOSIS — E78.2 MIXED HYPERLIPIDEMIA: ICD-10-CM

## 2025-08-18 DIAGNOSIS — Z00.00 MEDICARE ANNUAL WELLNESS VISIT, SUBSEQUENT: Primary | ICD-10-CM

## 2025-08-18 DIAGNOSIS — R91.1 LUNG NODULE: ICD-10-CM

## 2025-08-18 DIAGNOSIS — F17.200 TOBACCO DEPENDENCE: ICD-10-CM

## 2025-08-18 DIAGNOSIS — E03.9 ACQUIRED HYPOTHYROIDISM: ICD-10-CM

## 2025-08-18 PROCEDURE — 80053 COMPREHEN METABOLIC PANEL: CPT | Performed by: STUDENT IN AN ORGANIZED HEALTH CARE EDUCATION/TRAINING PROGRAM

## 2025-08-18 PROCEDURE — 36415 COLL VENOUS BLD VENIPUNCTURE: CPT | Performed by: STUDENT IN AN ORGANIZED HEALTH CARE EDUCATION/TRAINING PROGRAM

## 2025-08-18 PROCEDURE — 80061 LIPID PANEL: CPT | Performed by: STUDENT IN AN ORGANIZED HEALTH CARE EDUCATION/TRAINING PROGRAM

## 2025-08-18 PROCEDURE — 84443 ASSAY THYROID STIM HORMONE: CPT | Performed by: STUDENT IN AN ORGANIZED HEALTH CARE EDUCATION/TRAINING PROGRAM

## 2025-08-18 RX ORDER — ATORVASTATIN CALCIUM 10 MG/1
10 TABLET, FILM COATED ORAL DAILY
COMMUNITY
End: 2025-08-18

## 2025-08-18 RX ORDER — HYDROCODONE BITARTRATE AND ACETAMINOPHEN 5; 325 MG/1; MG/1
5 TABLET ORAL EVERY 6 HOURS PRN
COMMUNITY
Start: 2025-06-05 | End: 2025-08-18

## 2025-08-19 LAB
ALBUMIN SERPL-MCNC: 4.4 G/DL (ref 3.5–5.2)
ALBUMIN/GLOB SERPL: 1.3 G/DL
ALP SERPL-CCNC: 90 U/L (ref 39–117)
ALT SERPL W P-5'-P-CCNC: 14 U/L (ref 1–33)
ANION GAP SERPL CALCULATED.3IONS-SCNC: 13.9 MMOL/L (ref 5–15)
AST SERPL-CCNC: 23 U/L (ref 1–32)
BILIRUB SERPL-MCNC: 0.4 MG/DL (ref 0–1.2)
BUN SERPL-MCNC: 13 MG/DL (ref 8–23)
BUN/CREAT SERPL: 15.7 (ref 7–25)
CALCIUM SPEC-SCNC: 9.9 MG/DL (ref 8.6–10.5)
CHLORIDE SERPL-SCNC: 101 MMOL/L (ref 98–107)
CHOLEST SERPL-MCNC: 216 MG/DL (ref 0–200)
CO2 SERPL-SCNC: 21.1 MMOL/L (ref 22–29)
CREAT SERPL-MCNC: 0.83 MG/DL (ref 0.57–1)
EGFRCR SERPLBLD CKD-EPI 2021: 76.9 ML/MIN/1.73
GLOBULIN UR ELPH-MCNC: 3.5 GM/DL
GLUCOSE SERPL-MCNC: 90 MG/DL (ref 65–99)
HDLC SERPL-MCNC: 81 MG/DL (ref 40–60)
LDLC SERPL CALC-MCNC: 123 MG/DL (ref 0–100)
LDLC/HDLC SERPL: 1.49 {RATIO}
POTASSIUM SERPL-SCNC: 4.6 MMOL/L (ref 3.5–5.2)
PROT SERPL-MCNC: 7.9 G/DL (ref 6–8.5)
SODIUM SERPL-SCNC: 136 MMOL/L (ref 136–145)
TRIGL SERPL-MCNC: 70 MG/DL (ref 0–150)
TSH SERPL DL<=0.05 MIU/L-ACNC: 2.05 UIU/ML (ref 0.27–4.2)
VLDLC SERPL-MCNC: 12 MG/DL (ref 5–40)

## (undated) DEVICE — TOTAL TRAY, 16FR 10ML SIL FOLEY, URN: Brand: MEDLINE

## (undated) DEVICE — INTENDED FOR TISSUE SEPARATION, AND OTHER PROCEDURES THAT REQUIRE A SHARP SURGICAL BLADE TO PUNCTURE OR CUT.: Brand: BARD-PARKER ® STAINLESS STEEL BLADES

## (undated) DEVICE — DRSNG WND BORDR/ADHS NONADHR/GZ LF 4X10IN STRL

## (undated) DEVICE — LEX GENERAL ABDOMINAL SPLIT: Brand: MEDLINE INDUSTRIES, INC.

## (undated) DEVICE — DRSNG WND BORDR/ADHS NONADHR/GZ LF 2X2IN STRL

## (undated) DEVICE — TRY SKINPREP DRYPREP

## (undated) DEVICE — TRY EPID SFTY 18G 3.5IN 1T7680

## (undated) DEVICE — CONTAINER,SPECIMEN,OR STERILE,4OZ: Brand: MEDLINE

## (undated) DEVICE — HANDPIECE SET WITH HIGH FLOW TIP AND SUCTION TUBE: Brand: INTERPULSE

## (undated) DEVICE — CLTH CLENS READYCLEANSE PERI CARE PK/5

## (undated) DEVICE — PENCL E/S HNDSWCH ROCKRBTN HOLSTR 10FT

## (undated) DEVICE — FLTR HME STR UNIV W/SMPL PORT

## (undated) DEVICE — MEDI-VAC NON-CONDUCTIVE SUCTION TUBING: Brand: CARDINAL HEALTH

## (undated) DEVICE — 1-PIECE DRAINABLE OSTOMY POUCH, FLEXTEND: Brand: PREMIER

## (undated) DEVICE — COVER,MAYO STAND,XL,STERILE: Brand: MEDLINE

## (undated) DEVICE — JP PERF DRN SIL FLT 10MM FULL: Brand: CARDINAL HEALTH

## (undated) DEVICE — DRSNG WND GZ PAD BORDERED 4X8IN STRL

## (undated) DEVICE — GLV SURG SENSICARE W/ALOE PF LF 9 STRL

## (undated) DEVICE — COVER,LIGHT HANDLE,FLX,1/PK: Brand: MEDLINE INDUSTRIES, INC.

## (undated) DEVICE — SYR LL TP 10ML STRL

## (undated) DEVICE — AIRWY 90MM NO9

## (undated) DEVICE — PK HIP TOTL UNIV 10

## (undated) DEVICE — TUBING, SUCTION, 1/4" X 10', STRAIGHT: Brand: MEDLINE

## (undated) DEVICE — HDRST POSTIN FM CRDL TRACH SLOT 9X8X4IN

## (undated) DEVICE — JELLY,LUBE,STERILE,FLIP TOP,TUBE,2-OZ: Brand: MEDLINE

## (undated) DEVICE — SPNG LAP PREWSH SFTPK 18X18IN STRL PK/5

## (undated) DEVICE — ADAPT ST INFUS ADMIN SYR 70IN

## (undated) DEVICE — NDL HYPO ECLPS SFTY 22G 1 1/2IN

## (undated) DEVICE — DRSNG WND BORDR/ADHS NONADHR/GZ LF 4X4IN STRL

## (undated) DEVICE — CANNULA,OXY,ADULT,SUPERSOFT,W/7'TUB,UC: Brand: MEDLINE

## (undated) DEVICE — GOWN,NON-REINFORCED,SIRUS,SET IN SLV,XL: Brand: MEDLINE

## (undated) DEVICE — SUT PROLN 2/0 PC3 8833H

## (undated) DEVICE — SUT VIC 12X27 D8116 BX/12

## (undated) DEVICE — JACKSON-PRATT 100CC BULB RESERVOIR: Brand: CARDINAL HEALTH

## (undated) DEVICE — SUT PDS 1 CTX 36IN VIO PDP371T

## (undated) DEVICE — 2963 MEDIPORE SOFT CLOTH TAPE 3 IN X 10 YD 12 RLS/CS: Brand: 3M™ MEDIPORE™

## (undated) DEVICE — MEDI-VAC YANKAUER SUCTION HANDLE: Brand: CARDINAL HEALTH

## (undated) DEVICE — SUT VIC 0 SH 27IN J418H

## (undated) DEVICE — CATHETER,URETHRAL,REDRUBBER,STRL,16FR: Brand: MEDLINE

## (undated) DEVICE — ANTIBACTERIAL UNDYED BRAIDED (POLYGLACTIN 910), SYNTHETIC ABSORBABLE SUTURE: Brand: COATED VICRYL

## (undated) DEVICE — GLV SURG SENSICARE MICRO PF LF 8.5 STRL

## (undated) DEVICE — TOWEL,OR,DSP,ST,BLUE,STD,4/PK,20PK/CS: Brand: MEDLINE

## (undated) DEVICE — DRAPE, LAVH, STERILE: Brand: MEDLINE

## (undated) DEVICE — LEX BASIC NO DRAPE: Brand: MEDLINE INDUSTRIES, INC.

## (undated) DEVICE — SOL LR 1000ML

## (undated) DEVICE — STRYKER PERFORMANCE SERIES SAGITTAL BLADE: Brand: STRYKER PERFORMANCE SERIES

## (undated) DEVICE — ENCORE® LATEX MICRO SIZE 8.5, STERILE LATEX POWDER-FREE SURGICAL GLOVE: Brand: ENCORE

## (undated) DEVICE — MEDI-VAC YANKAUER SUCTION HANDLE W/BULBOUS TIP: Brand: CARDINAL HEALTH

## (undated) DEVICE — SYS SKIN CLS DERMABOND PRINEO W/22CM MESH TP

## (undated) DEVICE — CVR HNDL LT SURG ACCSSRY BLU STRL

## (undated) DEVICE — SUT SILK 0 CT1 18IN 424H

## (undated) DEVICE — SUT PROLN 1 CTX 30IN 8455H

## (undated) DEVICE — CANN NASL CO2 DIVIDED A/